# Patient Record
Sex: FEMALE | Race: WHITE | Employment: OTHER | URBAN - METROPOLITAN AREA
[De-identification: names, ages, dates, MRNs, and addresses within clinical notes are randomized per-mention and may not be internally consistent; named-entity substitution may affect disease eponyms.]

---

## 2017-08-29 ENCOUNTER — HOSPITAL ENCOUNTER (OUTPATIENT)
Dept: MAMMOGRAPHY | Age: 72
Discharge: HOME | End: 2017-08-29
Payer: MEDICARE

## 2017-08-29 DIAGNOSIS — Z12.31 VISIT FOR SCREENING MAMMOGRAM: ICD-10-CM

## 2017-08-29 PROCEDURE — 77063 BREAST TOMOSYNTHESIS BI: CPT

## 2017-10-16 ENCOUNTER — OFFICE VISIT (OUTPATIENT)
Dept: FAMILY MEDICINE CLINIC | Age: 72
End: 2017-10-16

## 2017-10-16 VITALS
TEMPERATURE: 96.6 F | DIASTOLIC BLOOD PRESSURE: 58 MMHG | RESPIRATION RATE: 16 BRPM | HEART RATE: 66 BPM | HEIGHT: 64 IN | BODY MASS INDEX: 26.48 KG/M2 | WEIGHT: 155.1 LBS | SYSTOLIC BLOOD PRESSURE: 130 MMHG | OXYGEN SATURATION: 95 %

## 2017-10-16 DIAGNOSIS — E66.9 OBESITY, UNSPECIFIED CLASSIFICATION, UNSPECIFIED OBESITY TYPE, UNSPECIFIED WHETHER SERIOUS COMORBIDITY PRESENT: Primary | ICD-10-CM

## 2017-10-16 DIAGNOSIS — Z98.84 BARIATRIC SURGERY STATUS: ICD-10-CM

## 2017-10-16 RX ORDER — PHENTERMINE HYDROCHLORIDE 37.5 MG/1
37.5 CAPSULE ORAL
COMMUNITY
End: 2017-10-16 | Stop reason: SDUPTHER

## 2017-10-16 RX ORDER — VENLAFAXINE 37.5 MG/1
37.5 TABLET ORAL DAILY
COMMUNITY

## 2017-10-16 RX ORDER — LEVOTHYROXINE SODIUM 137 UG/1
125 CAPSULE ORAL DAILY
COMMUNITY
End: 2019-02-28 | Stop reason: ALTCHOICE

## 2017-10-16 RX ORDER — LANOLIN ALCOHOL/MO/W.PET/CERES
500 CREAM (GRAM) TOPICAL DAILY
COMMUNITY

## 2017-10-16 RX ORDER — LISINOPRIL AND HYDROCHLOROTHIAZIDE 10; 12.5 MG/1; MG/1
TABLET ORAL DAILY
COMMUNITY
End: 2019-02-28 | Stop reason: ALTCHOICE

## 2017-10-16 RX ORDER — PHENTERMINE HYDROCHLORIDE 37.5 MG/1
37.5 CAPSULE ORAL
Qty: 30 CAP | Refills: 1 | Status: SHIPPED | OUTPATIENT
Start: 2017-10-16 | End: 2017-12-12 | Stop reason: SDUPTHER

## 2017-10-16 NOTE — PROGRESS NOTES
HISTORY OF PRESENT ILLNESS  Lucero Sylvester is a 74 y.o. female. New Patient   Pertinent negatives include no chest pain, no abdominal pain, no headaches and no shortness of breath. Weight Management   Pertinent negatives include no chest pain, no abdominal pain, no headaches and no shortness of breath. Medication Refill   Pertinent negatives include no chest pain, no abdominal pain, no headaches and no shortness of breath. Obesity Mgmt:  The patient is a former patient of mine last seen at [address] [place] [address]. She presents today for f/u for medication mgmt for obesity. She has been 147-150 pounds for years. Just recently it has increased to 150-152 pounds. Her goal weight is 140-150 pounds. She is 5 years postop. She has been on Phentermine daily until approximately 10 days ago. It was very effective and she denies any S/E. She tried Contrave but felt she was not feeling well, nausea, \"felt so bad\" and just wanted to sit around. She was unable to tolerate the medication and ultimately she stopped taking it after two weeks. GBP Surgery August 20, 2012 with Dr. Ashlyn Camarillo. Preop weight 253 pounds. Her daughter in law is having a gastric balloon placed next Tuesday at the [place]. Taking nutritional supplements as recommended. Her last labs were in July 2017. Exercise: Not very much. Review of Systems   Constitutional: Positive for weight gain. Negative for malaise/fatigue and weight loss. Respiratory: Negative for shortness of breath. Cardiovascular: Negative for chest pain, palpitations and leg swelling. Gastrointestinal: Positive for constipation (taking Linzess, Miralax with relief most of the time). Negative for abdominal pain, diarrhea, heartburn, nausea and vomiting. Has had some dumping with eating sugars   Musculoskeletal: Positive for joint pain (left knee pain, had a steroid injection just before her [place] trip, she has had some catching with initially getting up).  Negative for falls.   Neurological: Negative for dizziness and headaches. Wt Readings from Last 3 Encounters:   10/16/17 155 lb 1.6 oz (70.4 kg)   07/31/14 170 lb (77.1 kg)   07/09/14 170 lb (77.1 kg)     Temp Readings from Last 3 Encounters:   10/16/17 96.6 °F (35.9 °C) (Oral)   11/20/12 97.5 °F (36.4 °C)   09/18/12 98.1 °F (36.7 °C)     BP Readings from Last 3 Encounters:   10/16/17 130/58   07/31/14 133/71   07/09/14 159/70     Pulse Readings from Last 3 Encounters:   10/16/17 66   07/31/14 70   07/09/14 64       Physical Exam   Constitutional: She is oriented to person, place, and time. She appears well-developed and well-nourished. No distress. Neck: Normal range of motion. Neck supple. No thyromegaly present. Cardiovascular: Normal rate, regular rhythm and normal heart sounds. Exam reveals no gallop and no friction rub. No murmur heard. Pulmonary/Chest: Effort normal and breath sounds normal. No respiratory distress. She has no wheezes. She has no rales. She exhibits no tenderness. Abdominal: Soft. Bowel sounds are normal. She exhibits no distension and no mass. There is no tenderness. Neurological: She is alert and oriented to person, place, and time. Skin: Skin is warm and dry. No rash noted. No pallor. Psychiatric: She has a normal mood and affect. ASSESSMENT and PLAN    ICD-10-CM ICD-9-CM    1. Obesity, unspecified classification, unspecified obesity type, unspecified whether serious comorbidity present E66.9 278.00 phentermine 37.5 mg capsule   2. Bariatric surgery status Z98.84 V45.86 phentermine 37.5 mg capsule     Follow-up Disposition:  Return in about 2 months (around 12/16/2017) for Medication Check, Weight Mgmt.   reviewed diet, exercise and weight control  reviewed medications and side effects in detail

## 2017-10-16 NOTE — PATIENT INSTRUCTIONS
When You Are Overweight: Care Instructions  Your Care Instructions  If you're overweight, your doctor may recommend that you make changes in your eating and exercise habits. Being overweight can lead to serious health problems, such as high blood pressure, heart disease, type 2 diabetes, and arthritis, or it can make these problems worse. Eating a healthy diet and being more active can help you reach and stay at a healthy weight. You dont have to make huge changes all at once. Start by making small changes in your eating and exercise habits. To lose weight, you need to burn more calories than you take in. You can do this by eating healthy foods in reasonable amounts and becoming more active every day. Follow-up care is a key part of your treatment and safety. Be sure to make and go to all appointments, and call your doctor if you are having problems. It's also a good idea to know your test results and keep a list of the medicines you take. How can you care for yourself at home? · Improve your eating habits. You'll be more successful if you work on changing one eating habit at a time. All foods, if eaten in moderation, can be part of healthy eating. Remember to:  [facility] a variety of foods from each food group. Include grains, vegetables, fruits, dairy, and protein foods. ¨ Limit foods high in fat, sugar, and calories. ¨ Eat slowly. And don't do anything else, such as watch TV, while you are eating. ¨ Pay attention to portion sizes. Put your food on a smaller plate. ¨ Plan your meals ahead of time. You'll be less likely to grab something that's not as healthy. · Get active. Regular activity can help you feel better, have more energy, and burn more calories. If you haven't been active, start slowly. Start with at least 30 minutes of moderate activity on most days of the week. Then gradually increase the amount of activity. Try for 60 or 90 minutes a day, at least 5 days a week.  There are a lot of ways to fit activity into your life. You can:  ¨ Walk or bike to the store. Or walk with a friend, or walk the dog. ¨ Mow the lawn, rake leaves, shovel snow, or do some gardening. ¨ Use the stairs instead of the elevator, at least for a few floors. · Change your thinking. Your thoughts have a lot to do with how you feel and what you do. When you're trying to reach a healthy weight, changing how you think about certain things may help. Here are some ideas:  ¨ Don't compare yourself to others. Healthy bodies come in all shapes and sizes. ¨ Pay attention to how hungry or full you feel. When you eat, be aware of why you're eating and how much you're eating. ¨ Focus on improving your health instead of dieting. Dieting almost never works over the long term. · Ask your doctor about other health professionals who can help you reach a healthy weight. ¨ A dietitian can help you make healthy changes in your diet. ¨ An exercise specialist or  can help you develop a safe and effective exercise program.  ¨ A counselor or psychiatrist can help you cope with issues such as depression, anxiety, or family problems that can make it hard to focus on reaching a healthy weight. · Get support from your family, your doctor, your friends, a support groupand support yourself. Where can you learn more? Go to http://dileep-tuyet.info/. Enter M622 in the search box to learn more about \"When You Are Overweight: Care Instructions. \"  Current as of: October 13, 2016  Content Version: 11.3  © 2595-9864 [employer]. Care instructions adapted under license by [employer] (which disclaims liability or warranty for this information). If you have questions about a medical condition or this instruction, always ask your healthcare professional. [address] any warranty or liability for your use of this information.   Phentermine (By mouth)   Phentermine (JIMBO-ter-yue)  Helps you lose weight when used for a short time. Brand Name(s): Adipex-P, Lomaira   There may be other brand names for this medicine. When This Medicine Should Not Be Used: This medicine is not right for everyone. Do not use it if you had an allergic reaction to phentermine or similar medicines, or if you are pregnant. How to Use This Medicine:   Dissolving Tablet, Capsule, Long Acting Capsule, Tablet, Dissolving Tablet  · Your doctor will tell you how much medicine to use. Do not use more than directed. · This medicine is not for long-term use. · To avoid trouble sleeping, always take this medicine in the morning and never at bedtime or late in the evening. ¨ Take the capsule 2 hours after breakfast.  ¨ Take the extended-release capsule before breakfast.  ¨ Take the disintegrating tablet in the morning, with or without food. ¨ Take the phentermine tablet before breakfast or 1 to 2 hours after breakfast.  ¨ Take Lomaira tablet 30 minutes before meals. · Swallow the extended-release capsule whole. Do not crush, break, or chew it. · If you are using the disintegrating tablet, make sure your hands are dry before you handle the tablet. Place the tablet on your tongue. It should melt quickly. After the tablet has melted, swallow or take a drink of water. · Tablet: Swallow whole. Do not crush, break, or chew it. · Carefully follow your doctor's instructions about any special diet. · Missed dose: Take a dose as soon as you remember. If it is almost time for your next dose, wait until then and take a regular dose. Do not take extra medicine to make up for a missed dose. · Store the medicine in a closed container at room temperature, away from heat, moisture, and direct light. Drugs and Foods to Avoid:   Ask your doctor or pharmacist before using any other medicine, including over-the-counter medicines, vitamins, and herbal products.   · Do not use this medicine and an MAO inhibitor (MAOI) within 14 days of each other. · Some medicines can affect how phentermine works. Tell your doctor if you are using any of the following:  ¨ Amphetamine medicine (including dextroamphetamine, methamphetamine)  ¨ Diet pills  ¨ Insulin or diabetes medicine  ¨ Medicine to treat depression (including fluoxetine, fluvoxamine, paroxetine, sertraline)  · Do not drink alcohol while you are using this medicine. Warnings While Using This Medicine:   · It is not safe to take this medicine during pregnancy. It could harm an unborn baby. Tell your doctor right away if you become pregnant. · Tell your doctor if you are breastfeeding, or if you have kidney disease, diabetes, glaucoma, congestive heart failure, heart or blood vessel disease, high blood pressure, overactive thyroid, or a history of stroke or drug abuse. Tell your doctor if have allergies to aspirin or tartrazine. · This medicine may cause the following problems:  ¨ Primary pulmonary hypertension (a serious lung problem)  ¨ Heart valve disease  ¨ Changes in blood sugar levels  · This medicine may make you dizzy or drowsy. Do not drive or do anything else that could be dangerous until you know how this medicine affects you. · This medicine can be habit-forming. Do not use more than your prescribed dose. Call your doctor if you think your medicine is not working. · Do not stop using this medicine suddenly. Your doctor will need to slowly decrease your dose before you stop it completely. · Your doctor will check your progress and the effects of this medicine at regular visits. Keep all appointments. · Keep all medicine out of the reach of children. Never share your medicine with anyone.   Possible Side Effects While Using This Medicine:   Call your doctor right away if you notice any of these side effects:  · Allergic reaction: Itching or hives, swelling in your face or hands, swelling or tingling in your mouth or throat, chest tightness, trouble breathing  · Chest pain, fainting, trouble breathing  · Fast, slow, pounding, or uneven heartbeat  · Seizures or tremors  · Severe headache  · Swelling of your feet or lower legs  If you notice these less serious side effects, talk with your doctor:   · Changes in sex drive  · Dizziness, drowsiness, mild headache  · Dry mouth or a bad taste in your mouth  · Nausea, vomiting, diarrhea, constipation, stomach cramps  · Restlessness or nervousness, trouble sleeping  If you notice other side effects that you think are caused by this medicine, tell your doctor. Call your doctor for medical advice about side effects. You may report side effects to FDA at 2-580-FDA-1091  © 2017 [address] [employer] Information is for End User's use only and may not be sold, redistributed or otherwise used for commercial purposes. The above information is an  only. It is not intended as medical advice for individual conditions or treatments. Talk to your doctor, nurse or pharmacist before following any medical regimen to see if it is safe and effective for you. [employer] Activation    Thank you for requesting access to [employer]. Please follow the instructions below to securely access and download your online medical record. [employer] allows you to send messages to your doctor, view your test results, renew your prescriptions, schedule appointments, and more. How Do I Sign Up? 1. In your internet browser, go to [email or web address]  2. Click on the First Time User? Click Here link in the Sign In box. You will be redirect to the New Member Sign Up page. 3. Enter your [employer] Access Code exactly as it appears below. You will not need to use this code after youve completed the sign-up process. If you do not sign up before the expiration date, you must request a new code. [employer] Access Code:  Activation code not generated  Current [employer] Status: Active (This is the date your [employer] access code will )    4. Enter the last four digits of your Social Security Number (xxxx) and Date of Birth (mm/dd/yyyy) as indicated and click Submit. You will be taken to the next sign-up page. 5. Create a [employer] ID. This will be your [employer] login ID and cannot be changed, so think of one that is secure and easy to remember. 6. Create a [employer] password. You can change your password at any time. 7. Enter your Password Reset Question and Answer. This can be used at a later time if you forget your password. 8. Enter your e-mail address. You will receive e-mail notification when new information is available in [address] 9. Click Sign Up. You can now view and download portions of your medical record. 10. Click the Download Summary menu link to download a portable copy of your medical information. Additional Information    If you have questions, please visit the Frequently Asked Questions section of the [employer] website at [email or web address] Remember, [employer] is NOT to be used for urgent needs. For medical emergencies, dial 911.

## 2017-10-16 NOTE — MR AVS SNAPSHOT
Visit Information Date & Time Provider Department Dept. Phone Encounter #  
 10/16/2017  2:00 PM Consuelo Koroma NP [facility] [phone] [id number] Follow-up Instructions Return in about 2 months (around 12/16/2017) for Medication Check, Weight Mgmt. Upcoming Health Maintenance Date Due FOBT Q 1 YEAR AGE 50-75 4/11/1993 ZOSTER VACCINE AGE 60> 2/11/2003 OSTEOPOROSIS SCREENING (DEXA) 4/11/2008 MEDICARE YEARLY EXAM 4/11/2008 MICROALBUMIN Q1 7/20/2010 FOOT EXAM Q1 8/18/2011 EYE EXAM RETINAL OR DILATED Q1 10/12/2011 GLAUCOMA SCREENING Q2Y 10/12/2012 HEMOGLOBIN A1C Q6M 2/2/2013 LIPID PANEL Q1 8/2/2013 Pneumococcal 65+ Low/Medium Risk (1 of 2 - PCV13) 10/31/2017* DTaP/Tdap/Td series (1 - Tdap) 10/29/2021* *Topic was postponed. The date shown is not the original due date. Allergies as of 10/16/2017  Review Complete On: 10/16/2017 By: Consuelo Koroma NP Severity Noted Reaction Type Reactions Cat Dander High 10/16/2017    Shortness of Breath Dog Dander High 10/16/2017    Shortness of Breath Mold High 10/16/2017    Shortness of Breath Other Plant, Animal, Environmental High 10/16/2017    Shortness of Breath Dust  
  
Current Immunizations  Reviewed on 8/20/2012 No immunizations on file. Not reviewed this visit You Were Diagnosed With   
  
 Codes Comments Obesity, unspecified classification, unspecified obesity type, unspecified whether serious comorbidity present    -  Primary ICD-10-CM: E66.9 ICD-9-CM: 278.00 Bariatric surgery status     ICD-10-CM: Z98.84 ICD-9-CM: V45.86 Vitals BP Pulse Temp Resp Height(growth percentile) Weight(growth percentile) 130/58 (BP 1 Location: Left arm, BP Patient Position: Sitting) 66 96.6 °F (35.9 °C) (Oral) 16 5' 3.5\" (1.613 m) 155 lb 1.6 oz (70.4 kg) SpO2 BMI OB Status Smoking Status 95% 27.04 kg/m2 Hysterectomy Former Smoker BMI and BSA Data Body Mass Index Body Surface Area  
 27.04 kg/m 2 1.78 m 2 Preferred Pharmacy Pharmacy Name Phone [id number] Angela Curry, [address] [phone] Your Updated Medication List  
  
   
This list is accurate as of: 10/16/17  3:31 PM.  Always use your most recent med list.  
  
  
  
  
 calcium 500 mg Tab Take  by mouth. Levothyroxine 137 mcg Cap Take  by mouth. LINZESS 290 mcg Cap capsule Generic drug:  linaclotide Take  by mouth Daily (before breakfast). lisinopril-hydroCHLOROthiazide 10-12.5 mg per tablet Commonly known as:  Climmie Talia Take  by mouth daily. multivitamin tablet Commonly known as:  ONE A DAY Take 1 Tab by mouth daily. omeprazole 20 mg capsule Commonly known as:  PRILOSEC Take 20 mg by mouth daily. phentermine 37.5 mg capsule Take 37.5 mg by mouth every morning. Max Daily Amount: 37.5 mg. PRESERVISION AREDS Cap capsule Generic drug:  Vit A,C,E-Zinc-Copper Take 1 Cap by mouth two (2) times a day. BREAKFAST & BED TIME. simvastatin 40 mg tablet Commonly known as:  ZOCOR  
TAKE 1 TABLET BY MOUTH NIGHTLY  
  
 venlafaxine 37.5 mg tablet Commonly known as:  [facility] Take 37.5 mg by mouth three (3) times daily. VITAMIN B-12 500 mcg tablet Generic drug:  cyanocobalamin Take 500 mcg by mouth daily. Prescriptions Printed Refills  
 phentermine 37.5 mg capsule 1 Sig: Take 37.5 mg by mouth every morning. Max Daily Amount: 37.5 mg.  
 Class: Print Route: Oral  
  
Follow-up Instructions Return in about 2 months (around 12/16/2017) for Medication Check, Weight Mgmt. Patient Instructions When You Are Overweight: Care Instructions Your Care Instructions If you're overweight, your doctor may recommend that you make changes in your eating and exercise habits.  Being overweight can lead to serious health problems, such as high blood pressure, heart disease, type 2 diabetes, and arthritis, or it can make these problems worse. Eating a healthy diet and being more active can help you reach and stay at a healthy weight. You dont have to make huge changes all at once. Start by making small changes in your eating and exercise habits. To lose weight, you need to burn more calories than you take in. You can do this by eating healthy foods in reasonable amounts and becoming more active every day. Follow-up care is a key part of your treatment and safety. Be sure to make and go to all appointments, and call your doctor if you are having problems. It's also a good idea to know your test results and keep a list of the medicines you take. How can you care for yourself at home? · Improve your eating habits. You'll be more successful if you work on changing one eating habit at a time. All foods, if eaten in moderation, can be part of healthy eating. Remember to: 
[facility] a variety of foods from each food group. Include grains, vegetables, fruits, dairy, and protein foods. ¨ Limit foods high in fat, sugar, and calories. ¨ Eat slowly. And don't do anything else, such as watch TV, while you are eating. ¨ Pay attention to portion sizes. Put your food on a smaller plate. ¨ Plan your meals ahead of time. You'll be less likely to grab something that's not as healthy. · Get active. Regular activity can help you feel better, have more energy, and burn more calories. If you haven't been active, start slowly. Start with at least 30 minutes of moderate activity on most days of the week. Then gradually increase the amount of activity. Try for 60 or 90 minutes a day, at least 5 days a week. There are a lot of ways to fit activity into your life. You can: 
¨ Walk or bike to the store. Or walk with a friend, or walk the dog. ¨ Mow the lawn, rake leaves, shovel snow, or do some gardening. ¨ Use the stairs instead of the elevator, at least for a few floors. · Change your thinking. Your thoughts have a lot to do with how you feel and what you do. When you're trying to reach a healthy weight, changing how you think about certain things may help. Here are some ideas: ¨ Don't compare yourself to others. Healthy bodies come in all shapes and sizes. ¨ Pay attention to how hungry or full you feel. When you eat, be aware of why you're eating and how much you're eating. ¨ Focus on improving your health instead of dieting. Dieting almost never works over the long term. · Ask your doctor about other health professionals who can help you reach a healthy weight. ¨ A dietitian can help you make healthy changes in your diet. ¨ An exercise specialist or  can help you develop a safe and effective exercise program. 
¨ A counselor or psychiatrist can help you cope with issues such as depression, anxiety, or family problems that can make it hard to focus on reaching a healthy weight. · Get support from your family, your doctor, your friends, a support groupand support yourself. Where can you learn more? Go to http://dileepBeHome247tuyet.info/. Enter X965 in the search box to learn more about \"When You Are Overweight: Care Instructions. \" Current as of: October 13, 2016 Content Version: 11.3 © 7977-2088 Healthwise, Incorporated. Care instructions adapted under license by [employer] (which disclaims liability or warranty for this information). If you have questions about a medical condition or this instruction, always ask your healthcare professional. [place] [address] any warranty or liability for your use of this information. Phentermine (By mouth) Phentermine (FEN-ter-meen) Helps you lose weight when used for a short time. Brand Name(s): Sergio Olea There may be other brand names for this medicine. When This Medicine Should Not Be Used: This medicine is not right for everyone. Do not use it if you had an allergic reaction to phentermine or similar medicines, or if you are pregnant. How to Use This Medicine:  
Dissolving Tablet, Capsule, Long Acting Capsule, Tablet, Dissolving Tablet · Your doctor will tell you how much medicine to use. Do not use more than directed. · This medicine is not for long-term use. · To avoid trouble sleeping, always take this medicine in the morning and never at bedtime or late in the evening. ¨ Take the capsule 2 hours after breakfast. 
¨ Take the extended-release capsule before breakfast. 
¨ Take the disintegrating tablet in the morning, with or without food. ¨ Take the phentermine tablet before breakfast or 1 to 2 hours after breakfast. 
¨ Take Lomaira tablet 30 minutes before meals. · Swallow the extended-release capsule whole. Do not crush, break, or chew it. · If you are using the disintegrating tablet, make sure your hands are dry before you handle the tablet. Place the tablet on your tongue. It should melt quickly. After the tablet has melted, swallow or take a drink of water. · Tablet: Swallow whole. Do not crush, break, or chew it. · Carefully follow your doctor's instructions about any special diet. · Missed dose: Take a dose as soon as you remember. If it is almost time for your next dose, wait until then and take a regular dose. Do not take extra medicine to make up for a missed dose. · Store the medicine in a closed container at room temperature, away from heat, moisture, and direct light. Drugs and Foods to Avoid: Ask your doctor or pharmacist before using any other medicine, including over-the-counter medicines, vitamins, and herbal products. · Do not use this medicine and an MAO inhibitor (MAOI) within 14 days of each other. · Some medicines can affect how phentermine works. Tell your doctor if you are using any of the following: ¨ Amphetamine medicine (including dextroamphetamine, methamphetamine) ¨ Diet pills ¨ Insulin or diabetes medicine ¨ Medicine to treat depression (including fluoxetine, fluvoxamine, paroxetine, sertraline) · Do not drink alcohol while you are using this medicine. Warnings While Using This Medicine: · It is not safe to take this medicine during pregnancy. It could harm an unborn baby. Tell your doctor right away if you become pregnant. · Tell your doctor if you are breastfeeding, or if you have kidney disease, diabetes, glaucoma, congestive heart failure, heart or blood vessel disease, high blood pressure, overactive thyroid, or a history of stroke or drug abuse. Tell your doctor if have allergies to aspirin or tartrazine. · This medicine may cause the following problems: 
¨ Primary pulmonary hypertension (a serious lung problem) ¨ Heart valve disease ¨ Changes in blood sugar levels · This medicine may make you dizzy or drowsy. Do not drive or do anything else that could be dangerous until you know how this medicine affects you. · This medicine can be habit-forming. Do not use more than your prescribed dose. Call your doctor if you think your medicine is not working. · Do not stop using this medicine suddenly. Your doctor will need to slowly decrease your dose before you stop it completely. · Your doctor will check your progress and the effects of this medicine at regular visits. Keep all appointments. · Keep all medicine out of the reach of children. Never share your medicine with anyone. Possible Side Effects While Using This Medicine:  
Call your doctor right away if you notice any of these side effects: · Allergic reaction: Itching or hives, swelling in your face or hands, swelling or tingling in your mouth or throat, chest tightness, trouble breathing · Chest pain, fainting, trouble breathing · Fast, slow, pounding, or uneven heartbeat · Seizures or tremors · Severe headache · Swelling of your feet or lower legs If you notice these less serious side effects, talk with your doctor: · Changes in sex drive · Dizziness, drowsiness, mild headache · Dry mouth or a bad taste in your mouth · Nausea, vomiting, diarrhea, constipation, stomach cramps · Restlessness or nervousness, trouble sleeping If you notice other side effects that you think are caused by this medicine, tell your doctor. Call your doctor for medical advice about side effects. You may report side effects to FDA at 4-811-TLA-4990 ©  2600 Kian Carrasco Information is for End User's use only and may not be sold, redistributed or otherwise used for commercial purposes. The above information is an  only. It is not intended as medical advice for individual conditions or treatments. Talk to your doctor, nurse or pharmacist before following any medical regimen to see if it is safe and effective for you. In[employer] Resource Activation Thank you for requesting access to Infusion Resource. Please follow the instructions below to securely access and download your online medical record. In[employer] Resource allows you to send messages to your doctor, view your test results, renew your prescriptions, schedule appointments, and more. How Do I Sign Up? 1. In your internet browser, go to [email or web address] 
2. Click on the First Time User? Click Here link in the Sign In box. You will be redirect to the New Member Sign Up page. 3. Enter your In[employer] Resource Access Code exactly as it appears below. You will not need to use this code after youve completed the sign-up process. If you do not sign up before the expiration date, you must request a new code. In[employer] Access Code: Activation code not generated Current Infusion Resource Status: Active (This is the date your Infusion Resource access code will ) 4. Enter the last four digits of your Social Security Number (xxxx) and Date of Birth (mm/dd/yyyy) as indicated and click Submit.  You will be taken to the next sign-up page. 5. Create a [employer] ID. This will be your [employer] login ID and cannot be changed, so think of one that is secure and easy to remember. 6. Create a [employer] password. You can change your password at any time. 7. Enter your Password Reset Question and Answer. This can be used at a later time if you forget your password. 8. Enter your e-mail address. You will receive e-mail notification when new information is available in [address] 9. Click Sign Up. You can now view and download portions of your medical record. 10. Click the Download Summary menu link to download a portable copy of your medical information. Additional Information If you have questions, please visit the Frequently Asked Questions section of the [employer] website at [email or web address] Remember, [employer] is NOT to be used for urgent needs. For medical emergencies, dial 911. Introducing [facility] SERVICES! Dear Alana Whittington: Thank you for requesting a [employer] account. Our records indicate that you already have an active [employer] account. You can access your account anytime at [email or web address]/Endpoint Clinical Did you know that you can access your hospital and ER discharge instructions at any time in [employer]? You can also review all of your test results from your hospital stay or ER visit. Additional Information If you have questions, please visit the Frequently Asked Questions section of the [employer] website at [email or web address] Remember, [employer] is NOT to be used for urgent needs. For medical emergencies, dial 911. Now available from your iPhone and Android! Please provide this summary of care documentation to your next provider. Your primary care clinician is listed as Jose Feliciano. If you have any questions after today's visit, please call [phone].

## 2017-12-12 ENCOUNTER — OFFICE VISIT (OUTPATIENT)
Dept: FAMILY MEDICINE CLINIC | Age: 72
End: 2017-12-12

## 2017-12-12 VITALS
BODY MASS INDEX: 26.26 KG/M2 | WEIGHT: 153.8 LBS | OXYGEN SATURATION: 92 % | TEMPERATURE: 97.2 F | HEIGHT: 64 IN | RESPIRATION RATE: 18 BRPM | SYSTOLIC BLOOD PRESSURE: 137 MMHG | HEART RATE: 75 BPM | DIASTOLIC BLOOD PRESSURE: 68 MMHG

## 2017-12-12 DIAGNOSIS — H61.21 IMPACTED CERUMEN OF RIGHT EAR: ICD-10-CM

## 2017-12-12 DIAGNOSIS — Z98.84 BARIATRIC SURGERY STATUS: ICD-10-CM

## 2017-12-12 DIAGNOSIS — E66.9 OBESITY, UNSPECIFIED CLASSIFICATION, UNSPECIFIED OBESITY TYPE, UNSPECIFIED WHETHER SERIOUS COMORBIDITY PRESENT: Primary | ICD-10-CM

## 2017-12-12 DIAGNOSIS — J45.20 MILD INTERMITTENT ASTHMA WITHOUT COMPLICATION: Chronic | ICD-10-CM

## 2017-12-12 DIAGNOSIS — L98.7 EXCESS SKIN: ICD-10-CM

## 2017-12-12 RX ORDER — ALBUTEROL SULFATE 90 UG/1
2 AEROSOL, METERED RESPIRATORY (INHALATION)
COMMUNITY
End: 2017-12-12 | Stop reason: SDUPTHER

## 2017-12-12 RX ORDER — PHENTERMINE HYDROCHLORIDE 37.5 MG/1
37.5 CAPSULE ORAL
Qty: 30 CAP | Refills: 1 | Status: SHIPPED | OUTPATIENT
Start: 2017-12-12 | End: 2018-03-01 | Stop reason: SDUPTHER

## 2017-12-12 RX ORDER — MONTELUKAST SODIUM 10 MG/1
10 TABLET ORAL DAILY
Qty: 30 TAB | Refills: 1 | Status: SHIPPED | OUTPATIENT
Start: 2017-12-12 | End: 2018-03-05 | Stop reason: SDUPTHER

## 2017-12-12 RX ORDER — ALBUTEROL SULFATE 90 UG/1
2 AEROSOL, METERED RESPIRATORY (INHALATION)
Qty: 1 INHALER | Refills: 1 | Status: SHIPPED | OUTPATIENT
Start: 2017-12-12 | End: 2018-05-11 | Stop reason: ALTCHOICE

## 2017-12-12 NOTE — PATIENT INSTRUCTIONS
Albuterol (AccuNeb, Proventil, Proventil HFA, Ventolin HFA) - (By breathing)   Why this medicine is used:   Treats or prevents bronchospasm. Contact a nurse or doctor right away if you have:  · Trouble breathing, increased wheezing or cough, chest tightness  · Fast, pounding, or uneven heartbeat; chest pain  · Muscle cramps, nausea, vomiting  · Dry mouth, increased thirst     Common side effects:  · Tremors, nervousness  · Cough, sore throat, runny or stuffy nose  · Headache  © 2017 2600 K[employer] Carras[employer] Information is for End User's use only and may not be sold, redistributed or otherwise used for commercial purposes.

## 2017-12-12 NOTE — PROGRESS NOTES
Chief Complaint   Patient presents with    Weight Management     Here for weight check and med refill.  Medication Refill     Doing well on the Phentrimene     1. Have you been to the ER, urgent care clinic since your last visit? Hospitalized since your last visit? No    2. Have you seen or consulted any other health care providers outside of the [address] [place] [address] since your last visit? Include any pap smears or colon screening. No     I have reviewed Health Maintenance with the patient and updated. Advance Care Planning information reviewed and given to the patient. The patient was counseled on the dangers of tobacco use, and Patient is a non smoker. Reviewed strategies to maximize success, including Continue not to smoke.

## 2017-12-12 NOTE — MR AVS SNAPSHOT
Visit Information Date & Time Provider Department Dept. Phone Encounter #  
 12/12/2017  8:00 AM Lena Salguero NP [facility] [phone] [id number] Follow-up Instructions Return in about 2 months (around 2/12/2018) for Medication Check, Weight Mgmt. Upcoming Health Maintenance Date Due HEMOGLOBIN A1C Q6M 6/12/2018 LIPID PANEL Q1 7/19/2018 COLONOSCOPY 8/13/2018 FOOT EXAM Q1 10/19/2018 MICROALBUMIN Q1 10/19/2018 MEDICARE YEARLY EXAM 12/13/2018 GLAUCOMA SCREENING Q2Y 8/9/2019 DTaP/Tdap/Td series (2 - Td) 11/14/2022 Allergies as of 12/12/2017  Review Complete On: 12/12/2017 By: Lena Salguero NP Severity Noted Reaction Type Reactions Cat Dander High 10/16/2017    Shortness of Breath Dog Dander High 10/16/2017    Shortness of Breath Mold High 10/16/2017    Shortness of Breath Other Plant, Animal, Environmental High 10/16/2017    Shortness of Breath Dust  
  
Current Immunizations  Reviewed on 12/12/2017 No immunizations on file. Reviewed by Sheryle Kill, RN on 12/12/2017 at  8:16 AM  
You Were Diagnosed With   
  
 Codes Comments Obesity, unspecified classification, unspecified obesity type, unspecified whether serious comorbidity present    -  Primary ICD-10-CM: E66.9 ICD-9-CM: 278.00 Bariatric surgery status     ICD-10-CM: Z98.84 ICD-9-CM: V45.86 Mild intermittent asthma without complication     SALINAS-57-MS: J45.20 ICD-9-CM: 493.90 Excess skin     ICD-10-CM: L98.7 ICD-9-CM: 701.9 Impacted cerumen of right ear     ICD-10-CM: H61.21 ICD-9-CM: 380.4 Vitals BP Pulse Temp Resp Height(growth percentile) Weight(growth percentile)  
 137/68 (BP 1 Location: Left arm, BP Patient Position: Sitting) 75 97.2 °F (36.2 °C) (Oral) 18 5' 3.5\" (1.613 m) 153 lb 12.8 oz (69.8 kg) SpO2 BMI OB Status Smoking Status 92% 26.82 kg/m2 Hysterectomy Former Smoker Vitals History BMI and BSA Data Body Mass Index Body Surface Area  
 26.82 kg/m 2 1.77 m 2 Preferred Pharmacy Pharmacy Name Phone [id number] Angela Eric, [address] [phone] Your Updated Medication List  
  
   
This list is accurate as of: 12/12/17  9:27 AM.  Always use your most recent med list.  
  
  
  
  
 albuterol 90 mcg/actuation inhaler Commonly known as:  PROAIR HFA Take 2 Puffs by inhalation every four (4) hours as needed for Wheezing. calcium 500 mg Tab Take  by mouth. Levothyroxine 137 mcg Cap Take  by mouth. LINZESS 290 mcg Cap capsule Generic drug:  linaclotide Take  by mouth Daily (before breakfast). lisinopril-hydroCHLOROthiazide 10-12.5 mg per tablet Commonly known as:  Milagros [place] Take  by mouth daily. montelukast 10 mg tablet Commonly known as:  SINGULAIR Take 1 Tab by mouth daily. Indications: Allergic Rhinitis, MAINTENANCE THERAPY FOR ASTHMA  
  
 multivitamin tablet Commonly known as:  ONE A DAY Take 1 Tab by mouth daily. omeprazole 20 mg capsule Commonly known as:  PRILOSEC Take 20 mg by mouth daily. phentermine 37.5 mg capsule Take 37.5 mg by mouth every morning. Max Daily Amount: 37.5 mg. PRESERVISION AREDS Cap capsule Generic drug:  Vit A,C,E-Zinc-Copper Take 1 Cap by mouth two (2) times a day. BREAKFAST & BED TIME. simvastatin 40 mg tablet Commonly known as:  ZOCOR  
TAKE 1 TABLET BY MOUTH NIGHTLY  
  
 venlafaxine 37.5 mg tablet Commonly known as:  [facility] Take 37.5 mg by mouth three (3) times daily. VITAMIN B-12 500 mcg tablet Generic drug:  cyanocobalamin Take 500 mcg by mouth daily. Prescriptions Printed Refills  
 phentermine 37.5 mg capsule 1 Sig: Take 37.5 mg by mouth every morning. Max Daily Amount: 37.5 mg.  
 Class: Print Route: Oral  
  
Prescriptions Sent to Pharmacy Refills albuterol (PROAIR HFA) 90 mcg/actuation inhaler 1 Sig: Take 2 Puffs by inhalation every four (4) hours as needed for Wheezing. Class: Normal  
 Pharmacy: [address] #: [phone] Route: Inhalation  
 montelukast (SINGULAIR) 10 mg tablet 1 Sig: Take 1 Tab by mouth daily. Indications: Allergic Rhinitis, MAINTENANCE THERAPY FOR ASTHMA Class: Normal  
 Pharmacy: [place] [address] Ph #: [phone] Route: Oral  
  
We Performed the Following REFERRAL TO PLASTIC SURGERY [REF89 Custom] REMOVAL IMPACTED CERUMEN IRRIGATION/LVG UNILAT [id number] CPT(R)] Follow-up Instructions Return in about 2 months (around 2/12/2018) for Medication Check, Weight Mgmt. Referral Information Referral ID Referred By Referred To  
  
 [id number] [facility] [address], [address] [place] [address] Pky Visits Status Start Date End Date 1 New Request 12/12/17 12/12/18 If your referral has a status of pending review or denied, additional information will be sent to support the outcome of this decision. Patient Instructions Albuterol (AccuNeb, Proventil, Proventil HFA, Ventolin HFA) - (By breathing) Why this medicine is used:  
Treats or prevents bronchospasm. Contact a nurse or doctor right away if you have: · Trouble breathing, increased wheezing or cough, chest tightness · Fast, pounding, or uneven heartbeat; chest pain · Muscle cramps, nausea, vomiting · Dry mouth, increased thirst  
 
Common side effects: · Tremors, nervousness · Cough, sore throat, runny or stuffy nose · Headache © 2017 2600 Kian Carrasco Information is for End User's use only and may not be sold, redistributed or otherwise used for commercial purposes. Introducing [facility] & HEALTH SERVICES! Dear Emile Bruno: Thank you for requesting a [employer] account. Our records indicate that you already have an active [employer] account. You can access your account anytime at [email or web address] with Friends. [employer]/Fit with Friends Did you know that you can access your hospital and ER discharge instructions at any time in [employer]? You can also review all of your test results from your hospital stay or ER visit. Additional Information If you have questions, please visit the Frequently Asked Questions section of the [employer] website at [email or web address] with [email or web address] [employer]/Fit with Friends/. Remember, [employer] is NOT to be used for urgent needs. For medical emergencies, dial 911. Now available from your iPhone and Android! Please provide this summary of care documentation to your next provider. Your primary care clinician is listed as Marilu Dawson. If you have any questions after today's visit, please call [phone].

## 2017-12-12 NOTE — PROGRESS NOTES
Chief Complaint   Patient presents with    Weight Management     Here for weight check and med refill.  Medication Refill     Doing well on the Phentrimene         HPI:  The patient is a 74 y.o. female who presents today for a follow up appointment for weight management. Weight Mgmt:  The patient is a former patient of mine last seen at [address] [place] [address]. She has been 147-150 pounds for years. At her last visit on 10/16/2017 it had increased to 150-152 pounds. Her goal weight is 140-150 pounds. She is 5 years postop. She had been on Phentermine daily with good effectiveness. She also tried Contrave but felt she was not feeling well, nausea, \"felt so bad\" and just wanted to sit around. She was unable to tolerate the medication and ultimately she stopped taking it after about two weeks. She has lost 2 pounds since her last vist.  She started Phentermine 2 months ago with good effectiveness. She reports good hunger control and denies any adverse effects. She stopped it for two weeks during this time but restarted approximately 1 week ago. GBP Surgery August 20, 2012 with Dr. Jason Hogue. Preop weight 253 pounds. Her daughter in law was supposed to have a gastric balloon placed in October at the [place]. She never had this done because they found something during the procedure they needed to address and they postponed it. Her last labs were in July 2017. Taking nutritional supplements as recommended. Exercise: Not very much. Asthma:  She got her xmas decorations out of her storage unit x 3 weeks ago. She has had congestion, ear fullness, cough and wheezing symptoms intermittently since then. She has a hx of asthma. She denies ear pain, fever/chills. Excess Skin:  She c/o excess skin on her abdomen and her UEs. No hospital, ER or specialist visits since last primary care visit except as noted below.      Patient Active Problem List   Diagnosis Code    DM [facility]diabetes mellitus) [facility] E11.9    Asthma J45.909    Sleep apnea G47.30    HTN     Macular degeneration H35.30    Elevated LFT's     Left sided sciatica M54.32    Allergic rhinitis J30.9    Hypothyroidism E03.9    Depression F32.9    Obesity E66.9    KOENIG (nonalcoholic steatohepatitis) K75.81    Vitamin D deficiency E55.9    Bariatric surgery status Z98.84       Past Medical History:   Diagnosis Date    Allergic rhinitis 1/28/2009    Asthma 1/28/2009    Dental disorder NEC     DM (diabetes mellitus) ([address] 1/28/2009    Elevated LFT's 1/28/2009    HTN 1/28/2009    CONTROLLED BY MEDS.  Hypothyroidism 1/28/2009    Left sided sciatica 1/28/2009    Macular degeneration 1/28/2009    Morbid obesity ([address]     Musculoskeletal disorder     Sleep apnea 1/28/2009    USES C-PAP    Thyroid disease     HYPOTHYROIDISM       Past Surgical History:   Procedure Laterality Date    BREAST SURGERY PROCEDURE UNLISTED      THINKS ITS RIGHT BREAST.    HX BREAST BIOPSY Bilateral 1997    neg./ ? laterality    HX GI      COLONOSCOPY X 1    HX GYN      TOTAL VAGINAL HYSTERECTOMY    HX GYN      EXPLOROTORY FOR ECTOPIC PREGNANCY (uterine horn) BTL., DNC    HX ORTHOPAEDIC  1980    carpal tunnel release       Social History   Substance Use Topics    Smoking status: Former Smoker     Packs/day: 1.00     Years: 6.00     Quit date: 1/28/1969    Smokeless tobacco: Never Used      Comment: 1966    Alcohol use 1.0 oz/week     1 Glasses of wine, 1 Shots of liquor per week      Comment: rare       Family History   Problem Relation Age of Onset    Macular Degen Mother     Hypertension Mother     Cancer Father      lung    Hypertension Father     Breast Cancer Sister 72    Hypertension Brother        Outpatient Prescriptions Marked as Taking for the 12/12/17 encounter (Office Visit) with Adwoa Fernandez NP   Medication Sig Dispense Refill    Levothyroxine 137 mcg cap Take  by mouth.       lisinopril-hydroCHLOROthiazide (PRINZIDE, ZESTORETIC) 10-12.5 mg per tablet Take  by mouth daily.  venlafaxine (EFFEXOR) 37.5 mg tablet Take 37.5 mg by mouth three (3) times daily.  cyanocobalamin (VITAMIN B-12) 500 mcg tablet Take 500 mcg by mouth daily.  linaclotide (LINZESS) 290 mcg cap capsule Take  by mouth Daily (before breakfast).  phentermine 37.5 mg capsule Take 37.5 mg by mouth every morning. Max Daily Amount: 37.5 mg. 30 Cap 1    multivitamin (ONE A DAY) tablet Take 1 Tab by mouth daily.  Vit A,C,E-Zinc-Copper (PRESERVISION AREDS) Cap capsule Take 1 Cap by mouth two (2) times a day. BREAKFAST & BED TIME. Allergies   Allergen Reactions    Cat Dander Shortness of Breath    Dog Dander Shortness of Breath    Mold Shortness of Breath    Other Plant, Animal, Environmental Shortness of Breath     Dust       Review of Systems   Constitutional: Positive for weight loss. Negative for chills, fever and malaise/fatigue. HENT: Positive for congestion and hearing loss (right ear recently w/ ear fullness). Negative for ear discharge, ear pain, nosebleeds, sinus pain, sore throat and tinnitus. Eyes: Negative for discharge and redness. Respiratory: Positive for cough and wheezing. Negative for shortness of breath. Cardiovascular: Negative for chest pain, palpitations and leg swelling. Gastrointestinal: Positive for constipation (relieved with Linzess and Miralax), nausea (x1 episode with eating a hot dog too fast) and vomiting (x1 episode with eating a hot dog too fast). Negative for abdominal pain, diarrhea and heartburn. Musculoskeletal: Negative for joint pain, myalgias and neck pain. Skin: Negative for rash. Neurological: Negative for dizziness and headaches. Endo/Heme/Allergies: Positive for environmental allergies. Psychiatric/Behavioral: Negative for depression. The patient is not nervous/anxious and does not have insomnia.       PE:  Visit Vitals    /68 (BP 1 Location: Left arm, BP Patient Position: Sitting)    Pulse 75    Temp 97.2 °F (36.2 °C) (Oral)    Resp 18    Ht 5' 3.5\" (1.613 m)    Wt 153 lb 12.8 oz (69.8 kg)    SpO2 92%    BMI 26.82 kg/m2     Physical Exam   Constitutional: She is well-developed, well-nourished, and in no distress. No distress. HENT:   Head: Normocephalic and atraumatic. Right Ear: Tympanic membrane is bulging. Decreased hearing is noted. Left Ear: External ear normal. Tympanic membrane is bulging. No decreased hearing is noted. Nose: Mucosal edema and rhinorrhea present. Right sinus exhibits no maxillary sinus tenderness and no frontal sinus tenderness. Left sinus exhibits no maxillary sinus tenderness and no frontal sinus tenderness. Mouth/Throat: Uvula is midline and mucous membranes are normal. Posterior oropharyngeal erythema (mild) present. No oropharyngeal exudate or posterior oropharyngeal edema. Right ear cerumen impaction present, cleared with lavage   Eyes: Conjunctivae and EOM are normal. Pupils are equal, round, and reactive to light. Right eye exhibits no discharge. Left eye exhibits no discharge. No scleral icterus. Neck: Normal range of motion. Neck supple. No thyromegaly present. Cardiovascular: Normal rate, regular rhythm and normal heart sounds. Exam reveals no gallop and no friction rub. No murmur heard. Pulmonary/Chest: Effort normal. No respiratory distress. She has decreased breath sounds. She has no wheezes. She has no rales. Lymphadenopathy:     She has no cervical adenopathy. Neurological: She is alert. Skin: Skin is warm and dry. No rash noted. Psychiatric: Affect normal.     Assessment/Plan:    ICD-10-CM ICD-9-CM    1. Obesity, unspecified classification, unspecified obesity type, unspecified whether serious comorbidity present E66.9 278.00 phentermine 37.5 mg capsule   2. Bariatric surgery status Z98.84 V45.86 phentermine 37.5 mg capsule   3.  Mild intermittent asthma without complication B43.26 697.94 albuterol (PROAIR HFA) 90 mcg/actuation inhaler      montelukast (SINGULAIR) 10 mg tablet   4. Excess skin L98.7 701.9 REFERRAL TO PLASTIC SURGERY   5. Impacted cerumen of right ear H61.21 380.4 REMOVAL IMPACTED CERUMEN IRRIGATION/LVG UNILAT     Follow-up Disposition:  Return in about 2 months (around 2/12/2018) for Medication Check, Weight Mgmt. current treatment plan is effective, no change in therapy  lab results and schedule of future lab studies reviewed with patient  reviewed diet, exercise and weight control  reviewed medications and side effects in detail    Health Maintenance reviewed - reviewed, UTD. Medicare AWV recommended, pt to check with her PCP to see if she is due for this again. Recommended healthy diet low in carbohydrates, fats, sodium and cholesterol. Recommended regular cardiovascular exercise 3-6 times per week for 30-60 minutes daily. Verbal and written instructions (see AVS) provided. Patient expresses understanding of diagnosis and treatment plan.

## 2018-03-01 ENCOUNTER — OFFICE VISIT (OUTPATIENT)
Dept: FAMILY MEDICINE CLINIC | Age: 73
End: 2018-03-01

## 2018-03-01 VITALS
TEMPERATURE: 97.3 F | OXYGEN SATURATION: 94 % | RESPIRATION RATE: 18 BRPM | SYSTOLIC BLOOD PRESSURE: 128 MMHG | WEIGHT: 159.1 LBS | HEIGHT: 64 IN | BODY MASS INDEX: 27.16 KG/M2 | DIASTOLIC BLOOD PRESSURE: 61 MMHG | HEART RATE: 69 BPM

## 2018-03-01 DIAGNOSIS — Z98.84 BARIATRIC SURGERY STATUS: ICD-10-CM

## 2018-03-01 DIAGNOSIS — E66.9 OBESITY, UNSPECIFIED CLASSIFICATION, UNSPECIFIED OBESITY TYPE, UNSPECIFIED WHETHER SERIOUS COMORBIDITY PRESENT: ICD-10-CM

## 2018-03-01 DIAGNOSIS — J45.20 MILD INTERMITTENT ASTHMA WITHOUT COMPLICATION: Chronic | ICD-10-CM

## 2018-03-01 RX ORDER — PHENTERMINE HYDROCHLORIDE 37.5 MG/1
37.5 CAPSULE ORAL
Qty: 30 CAP | Refills: 1 | Status: SHIPPED | OUTPATIENT
Start: 2018-03-01 | End: 2018-04-05 | Stop reason: ALTCHOICE

## 2018-03-01 NOTE — PATIENT INSTRUCTIONS
Learning About Low-Carbohydrate Diets for Weight Loss  What is a low-carbohydrate diet? Low-carb diets avoid foods that are high in carbohydrate. These high-carb foods include pasta, bread, rice, cereal, fruits, and starchy vegetables. Instead, these diets usually have you eat foods that are high in fat and protein. Many people lose weight quickly on a low-carb diet. But the early weight loss is water. People on this diet often gain the weight back after they start eating carbs again. Not all diet plans are safe or work well. A lot of the evidence shows that low-carb diets aren't healthy. That's because these diets often don't include healthy foods like fruits and vegetables. Losing weight safely means balancing protein, fat, and carbs with every meal and snack. And low-carb diets don't always provide the vitamins, minerals, and fiber you need. If you have a serious medical condition, talk to your doctor before you try any diet. These conditions include kidney disease, heart disease, type 2 diabetes, high cholesterol, and high blood pressure. If you are pregnant, it may not be safe for your baby if you are on a low-carb diet. How can you lose weight safely? You might have heard that a diet plan helped another person lose weight. But that doesn't mean that it will work for you. It is very hard to stay on a diet that includes lots of big changes in your eating habits. If you want to get to a healthy weight and stay there, making healthy lifestyle changes will often work better than dieting. These steps can help. · Make a plan for change. Work with your doctor to create a plan that is right for you. · See a dietitian. He or she can show you how to make healthy changes in your eating habits. · Manage stress. If you have a lot of stress in your life, it can be hard to focus on making healthy changes to your daily habits. · Track your food and activity.  You are likely to do better at losing weight if you keep track of what you eat and what you do. Follow-up care is a key part of your treatment and safety. Be sure to make and go to all appointments, and call your doctor if you are having problems. It's also a good idea to know your test results and keep a list of the medicines you take. Where can you learn more? Go to http://dileep-tuyet.info/. Enter A121 in the search box to learn more about \"Learning About Low-Carbohydrate Diets for Weight Loss. \"  Current as of: May 12, 2017  Content Version: 11.4  © 1817-1982 [employer]. Care instructions adapted under license by [employer] (which disclaims liability or warranty for this information). If you have questions about a medical condition or this instruction, always ask your healthcare professional. [address] any warranty or liability for your use of this information. Liraglutide (By injection)   Liraglutide (gqb-b-MXPY-tide)  Treats type 2 diabetes and helps with weight loss in certain patients. Also reduces the risk of heart attacks and strokes in patients with type 2 diabetes and heart or blood vessel disease. Brand Name(s): Saxenda, Victoza   There may be other brand names for this medicine. When This Medicine Should Not Be Used: This medicine is not right for everyone. Do not use it if you had an allergic reaction to liraglutide, or if you have multiple endocrine neoplasia syndrome type 2 (MEN 2) or if you or anyone in your family had medullary thyroid cancer. Tell your doctor if you are pregnant or have become pregnant while you are using this medicine. How to Use This Medicine:   Injectable  · Your doctor will prescribe your exact dose and tell you how often it should be given. This medicine is given as a shot under the skin of your stomach, thighs, or upper arms. · If you use insulin in addition to this medicine, do not mix them into the same syringe.  You may give the shots in the same area (including your stomach), but do not give the shots right next to each other. · You may be taught how to give your medicine at home. Make sure you understand all instructions before giving yourself an injection. Do not use more medicine or use it more often than your doctor tells you to. · Check the liquid in the pen. It should be clear and colorless. Do not use it if it is cloudy, discolored, or has particles in it. · You will be shown the body areas where this shot can be given. Use a different body area each time you give yourself a shot. Keep track of where you give each shot to make sure you rotate body areas. · Use a new needle and syringe each time you inject your medicine. · Never share medicine pens with others under any circumstances. Sharing needles or pens can result in transmission of infection. · Drink extra fluids so you will urinate more often and help prevent kidney problems. · This medicine should come with a Medication Guide. Ask your pharmacist for a copy if you do not have one. · Missed dose: If you miss a dose of this medicine, use it as soon as you remember. Then take your next dose at your usual time. Never take extra medicine to make up for a missed dose. If you miss a dose for 3 days or more, call your doctor to talk about how to restart your treatment. · Store your new, unused medicine pen in its original carton in the refrigerator. Protect it from light. Do not freeze this medicine or use it if it has been frozen. You may store the opened medicine pen in the refrigerator or at room temperature for 30 days. Throw away your used pen after 30 days, even if it still has medicine in it. Always remove the needle from the pen before you store it. · Throw away used needles in a hard, closed container that the needles cannot poke through. Keep this container away from children and pets.   Drugs and Foods to Avoid:      Ask your doctor or pharmacist before using any other medicine, including over-the-counter medicines, vitamins, and herbal products. Warnings While Using This Medicine:   · Tell your doctor if you are breastfeeding, or if you have kidney disease, liver disease, digestion problems (including gastroparesis), gallbladder disease, or a history of pancreas problems, depression, or angioedema (swelling of the arms, face, hands, mouth, or throat). · Do not use Saxenda® if you are also using Victoza®. They contain the same medicine. · This medicine may cause the following problems:   ¨ Increased risk for thyroid tumors  ¨ Pancreatitis  ¨ Low blood sugar  ¨ Kidney problems  ¨ Gallbladder problems, including gallstones  ¨ Thoughts of hurting yourself [place] Hilton)  · Your doctor will do lab tests at regular visits to check on the effects of this medicine. Keep all appointments. · Keep all medicine out of the reach of children. Never share your medicine with anyone.   Possible Side Effects While Using This Medicine:   Call your doctor right away if you notice any of these side effects:  · Allergic reaction: Itching or hives, swelling in your face or hands, swelling or tingling in your mouth or throat, chest tightness, trouble breathing  · Change in how much or how often you urinate, painful or burning urination  · Feeling sad or depressed, thoughts of suicide, unusual changes in mood or behavior  · Shaking, trembling, sweating, fast or pounding heartbeat, fainting, hunger, confusion  · Sudden and severe stomach pain, nausea, vomiting, fever, lightheadedness  · Trouble breathing or swallowing, a lump in your neck, hoarseness when speaking  · Yellow skin or eyes  If you notice these less serious side effects, talk with your doctor:   · Decreased appetite  · Diarrhea, constipation, stomach upset  · Dizziness  · Headache  · Redness, itching, swelling, or any changes in your skin where the shot was given  If you notice other side effects that you think are caused by this medicine, tell your doctor. Call your doctor for medical advice about side effects. You may report side effects to FDA at 8-154-BTL-7558  © 2017 [facility] Information is for End User's use only and may not be sold, redistributed or otherwise used for commercial purposes. The above information is an  only. It is not intended as medical advice for individual conditions or treatments. Talk to your doctor, nurse or pharmacist before following any medical regimen to see if it is safe and effective for you.

## 2018-03-01 NOTE — PROGRESS NOTES
Chief Complaint   Patient presents with    Medication Evaluation     Patient stated medication check is for phentermine 2 month follow up    Weight Management     2 month follow up         HPI:  The patient is a 74 y.o. female who presents today for a follow up appointment. No hospital, ER or specialist visits since last primary care visit except as noted below. Weight Mgmt:  The patient is a former patient of mine last seen at [address] [place] [address]. GBP Surgery August 20, 2012 with Dr. Stephanie Noble. Preop weight 253 pounds. Her daughter in law was supposed to have a gastric balloon placed in October at the [place]. She never had this done because they found something during the procedure they needed to address and they postponed it. She has been 147-150 pounds for years. At her visit on 10/16/2017 it had increased to 150-152 pounds. Her goal weight is 140-150 pounds. She is 5 years postop. She had been on Phentermine daily with good effectiveness. She also tried Contrave but felt she was not feeling well, nausea, \"felt so bad\" and just wanted to sit around. She was unable to tolerate the medication and ultimately she stopped taking it after about two weeks. Stopped 2 weeks approximately about one month ago then restarted Phentermine. Today her weight is 159 pounds with a BMI of 27.74. She has gained 6 pounds since her last visit approximately 3 months ago. She stated that over the holidays she began eating [place] shortbread cookies and it became a habit. She reports good hunger control during the day but at night around 8pm she is hungry and wants something. Her last labs were in July 2017. Taking nutritional supplements as recommended. Exercise: Not very much. She is struggling with insomnia in the form of waking early. She is sleeping about 5 hours. She was prescribed Ambien about a year ago by her PCP. She denies N/V, GERD, night time regurgitation, abdominal pain.   She c/o constipation relieved with Linzess and Miralax with relief. She is taking in about 48-50+ fluids daily. Review of Systems  A comprehensive review of systems was negative except for that written in the HPI. Patient Active Problem List   Diagnosis Code    Asthma J45.909    Sleep apnea G47.30    HTN     Macular degeneration H35.30    Elevated LFT's     Left sided sciatica M54.32    Allergic rhinitis J30.9    Hypothyroidism E03.9    Depression F32.9    Obesity E66.9    Vitamin D deficiency E55.9    Bariatric surgery status Z98.84       Past Medical History:   Diagnosis Date    Allergic rhinitis 1/28/2009    Asthma 1/28/2009    Dental disorder NEC     DM (diabetes mellitus) ([address] 1/28/2009    Elevated LFT's 1/28/2009    HTN 1/28/2009    CONTROLLED BY MEDS.     Hypothyroidism 1/28/2009    Left sided sciatica 1/28/2009    Macular degeneration 1/28/2009    Morbid obesity ([facility]     Musculoskeletal disorder     Sleep apnea 1/28/2009    USES C-PAP    Thyroid disease     HYPOTHYROIDISM       Past Surgical History:   Procedure Laterality Date    BREAST SURGERY PROCEDURE UNLISTED      THINKS ITS RIGHT BREAST.    HX BREAST BIOPSY Bilateral 1997    neg./ ? laterality    HX GI      COLONOSCOPY X 1    HX GYN      TOTAL VAGINAL HYSTERECTOMY    HX GYN      EXPLOROTORY FOR ECTOPIC PREGNANCY (uterine horn) BTL., DNC    HX ORTHOPAEDIC  1980    carpal tunnel release       Social History   Substance Use Topics    Smoking status: Former Smoker     Packs/day: 1.00     Years: 6.00     Quit date: 1/28/1969    Smokeless tobacco: Never Used      Comment: 1966    Alcohol use 1.0 oz/week     1 Glasses of wine, 1 Shots of liquor per week      Comment: rare       Family History   Problem Relation Age of Onset    Macular Degen Mother     Hypertension Mother     Cancer Father      lung    Hypertension Father     Breast Cancer Sister 72    Hypertension Brother        Outpatient Prescriptions Marked as Taking for the 3/1/18 encounter (Office Visit) with Luisa Faustin NP   Medication Sig Dispense Refill    phentermine 37.5 mg capsule Take 37.5 mg by mouth every morning. Max Daily Amount: 37.5 mg. 30 Cap 1    albuterol (PROAIR HFA) 90 mcg/actuation inhaler Take 2 Puffs by inhalation every four (4) hours as needed for Wheezing. 1 Inhaler 1    montelukast (SINGULAIR) 10 mg tablet Take 1 Tab by mouth daily. Indications: Allergic Rhinitis, MAINTENANCE THERAPY FOR ASTHMA 30 Tab 1    Levothyroxine 137 mcg cap Take  by mouth.  lisinopril-hydroCHLOROthiazide (PRINZIDE, ZESTORETIC) 10-12.5 mg per tablet Take  by mouth daily.  venlafaxine (EFFEXOR) 37.5 mg tablet Take 37.5 mg by mouth three (3) times daily.  cyanocobalamin (VITAMIN B-12) 500 mcg tablet Take 500 mcg by mouth daily.  linaclotide (LINZESS) 290 mcg cap capsule Take  by mouth Daily (before breakfast).  multivitamin (ONE A DAY) tablet Take 1 Tab by mouth daily.  Vit A,C,E-Zinc-Copper (PRESERVISION AREDS) Cap capsule Take 1 Cap by mouth two (2) times a day. BREAKFAST & BED TIME.           Allergies   Allergen Reactions    Cat Dander Shortness of Breath    Dog Dander Shortness of Breath    Mold Shortness of Breath    Other Plant, Animal, Environmental Shortness of Breath     Dust       PE:  Visit Vitals    /61 (BP 1 Location: Left arm, BP Patient Position: Sitting)    Pulse 69    Temp 97.3 °F (36.3 °C) (Oral)    Resp 18    Ht 5' 3.5\" (1.613 m)    Wt 159 lb 1.6 oz (72.2 kg)    SpO2 94%    BMI 27.74 kg/m2     Gen: alert, oriented, no acute distress  Head: normocephalic, atraumatic  Ears: external auditory canals clear, TMs without erythema or effusion  Eyes: pupils equal round reactive to light, sclera clear, conjunctiva clear  Oral: moist mucus membranes, no oral lesions, no pharyngeal inflammation or exudate  Neck: symmetric normal sized thyroid, no carotid bruits, no jugular vein distention  Resp: no increase work of breathing, lungs clear to ausculation bilaterally, no wheezing, rales or rhonchi  CV: S1, S2 normal.  No murmurs, rubs, or gallops. Abd: soft, not tender, not distended. No hepatosplenomegaly. Normal bowel sounds. No hernias. No abdominal or renal bruits. Neuro: cranial nerves intact, normal strength and movement in all extremities, reflexes and sensation intact and symmetric. Skin: no lesion or rash  Extremities: no cyanosis or edema    Assessment/Plan:    ICD-10-CM ICD-9-CM    1. Obesity, unspecified classification, unspecified obesity type, unspecified whether serious comorbidity present E66.9 278.00 phentermine 37.5 mg capsule      liraglutide (SAXENDA) 3 mg/0.5 mL (18 mg/3 mL) pen      pen needle, diabetic (NOVOTWIST) 32 gauge x 1/5\" ndle      Oral Medication Containers (SHARPS CONTAINER) misc   2. Bariatric surgery status Z98.84 V45.86 phentermine 37.5 mg capsule      liraglutide (SAXENDA) 3 mg/0.5 mL (18 mg/3 mL) pen      pen needle, diabetic (NOVOTWIST) 32 gauge x 1/5\" ndle      Oral Medication Containers (SHARPS CONTAINER) misc     Follow-up Disposition:  Return in about 4 weeks (around 3/29/2018) for Medication Check, Weight Mgmt.  lab results and schedule of future lab studies reviewed with patient  reviewed diet, exercise and weight control  reviewed medications and side effects in detail    Saxenda if coverage, Wellbutrin alone, or Belviq just every day x 1 month, if tolerated increase to BID (? SS risk with Effexor)    Recommended healthy diet low in carbohydrates, fats, sodium and cholesterol. Recommended regular cardiovascular exercise 3-6 times per week for 30-60 minutes daily. Chart is reviewed and updated today in the office. Records requested for other providers patient has seen and is currently seeing. Patient was offered a choice/choices in the treatment plan today. Patient expresses understanding of the plan and agrees with recommendations. Verbal and written instructions (see AVS) provided.  See patient instructions for more. Patient expresses understanding of diagnosis and treatment plan.

## 2018-03-01 NOTE — MR AVS SNAPSHOT
[address] [facility], [place] 
 
 
 [address] [facility] 
[address] [place] [address] 
[phone] Patient: Clay Adams MRN: [id number] ALS:[id number] Visit Information Date & Time Provider Department Dept. Phone Encounter #  
 3/1/2018  8:20 AM Severo Prosper, NP [facility] [phone] [id number] Follow-up Instructions Return in about 4 weeks (around 3/29/2018) for Medication Check, Weight Mgmt. Follow-up and Disposition History Upcoming Health Maintenance Date Due COLONOSCOPY 8/13/2018 HEMOGLOBIN A1C Q6M 6/12/2018 LIPID PANEL Q1 7/19/2018 FOOT EXAM Q1 10/19/2018 MICROALBUMIN Q1 10/19/2018 MEDICARE YEARLY EXAM 12/13/2018 GLAUCOMA SCREENING Q2Y 8/9/2019 BREAST CANCER SCRN MAMMOGRAM 8/29/2019 DTaP/Tdap/Td series (2 - Td) 11/14/2022 Allergies as of 3/1/2018  Review Complete On: 3/1/2018 By: Severo Prosper, NP Severity Noted Reaction Type Reactions Cat Dander High 10/16/2017    Shortness of Breath Dog Dander High 10/16/2017    Shortness of Breath Mold High 10/16/2017    Shortness of Breath Other Plant, Animal, Environmental High 10/16/2017    Shortness of Breath Dust  
  
Current Immunizations  Reviewed on 12/12/2017 No immunizations on file. Not reviewed this visit You Were Diagnosed With   
  
 Codes Comments Obesity, unspecified classification, unspecified obesity type, unspecified whether serious comorbidity present     ICD-10-CM: E66.9 ICD-9-CM: 278.00 Bariatric surgery status     ICD-10-CM: Z98.84 ICD-9-CM: V45.86 Vitals BP Pulse Temp Resp Height(growth percentile) Weight(growth percentile) 128/61 (BP 1 Location: Left arm, BP Patient Position: Sitting) 69 97.3 °F (36.3 °C) (Oral) 18 5' 3.5\" (1.613 m) 159 lb 1.6 oz (72.2 kg) SpO2 BMI OB Status Smoking Status 94% 27.74 kg/m2 Hysterectomy Former Smoker Vitals History BMI and BSA Data Body Mass Index Body Surface Area  
 27.74 kg/m 2 1.8 m 2 Preferred Pharmacy Pharmacy Name Phone [id number] Angela Cordero, [address] [facility] [phone] Your Updated Medication List  
  
   
This list is accurate as of 3/1/18  9:47 AM.  Always use your most recent med list.  
  
  
  
  
 albuterol 90 mcg/actuation inhaler Commonly known as:  PROAIR HFA Take 2 Puffs by inhalation every four (4) hours as needed for Wheezing. calcium 500 mg Tab Take  by mouth. Levothyroxine 137 mcg Cap Take  by mouth. LINZESS 290 mcg Cap capsule Generic drug:  linaclotide Take  by mouth Daily (before breakfast). liraglutide 3 mg/0.5 mL (18 mg/3 mL) pen Commonly known as:  Ronel Edi Inject 0.6 x 1 week, then 1.2 x 1 week, then 1.8 x 1 week, then 2.4 x 1 week, then 3 x 1week  
  
 lisinopril-hydroCHLOROthiazide 10-12.5 mg per tablet Commonly known as:  Wylene Mings Take  by mouth daily. montelukast 10 mg tablet Commonly known as:  SINGULAIR Take 1 Tab by mouth daily. Indications: Allergic Rhinitis, MAINTENANCE THERAPY FOR ASTHMA  
  
 multivitamin tablet Commonly known as:  ONE A DAY Take 1 Tab by mouth daily. omeprazole 20 mg capsule Commonly known as:  PRILOSEC Take 20 mg by mouth daily. Oral Medication Containers Misc Commonly known as:  SHARPS CONTAINER  
1 Container by Does Not Apply route daily. pen needle, diabetic 32 gauge x 1/5\" Ndle Commonly known as:  NOVOTWIST  
30 UNSPECIFIED by Does Not Apply route daily. phentermine 37.5 mg capsule Take 37.5 mg by mouth every morning. Max Daily Amount: 37.5 mg. PRESERVISION AREDS Cap capsule Generic drug:  Vit A,C,E-Zinc-Copper Take 1 Cap by mouth two (2) times a day. BREAKFAST & BED TIME. simvastatin 40 mg tablet Commonly known as:  ZOCOR  
TAKE 1 TABLET BY MOUTH NIGHTLY  
  
 venlafaxine 37.5 mg tablet Commonly known as:  [facility] Take 37.5 mg by mouth three (3) times daily. VITAMIN B-12 500 mcg tablet Generic drug:  cyanocobalamin Take 500 mcg by mouth daily. Prescriptions Printed Refills  
 phentermine 37.5 mg capsule 1 Sig: Take 37.5 mg by mouth every morning. Max Daily Amount: 37.5 mg.  
 Class: Print Route: Oral  
  
Prescriptions Sent to Pharmacy Refills  
 liraglutide (SAXENDA) 3 mg/0.5 mL (18 mg/3 mL) pen 0 Sig: Inject 0.6 x 1 week, then 1.2 x 1 week, then 1.8 x 1 week, then 2.4 x 1 week, then 3 x 1week Class: Normal  
 Pharmacy: [address] Ph #: [phone]  
 pen needle, diabetic (NOVOTWIST) 32 gauge x 1/5\" ndle 2 Si UNSPECIFIED by Does Not Apply route daily. Class: Normal  
 Pharmacy: [address] Ph #: [phone] Route: Does Not Apply Oral Medication Containers (SHARPS CONTAINER) misc 2 Si Container by Does Not Apply route daily. Class: Normal  
 Pharmacy: [address] Ph #: [phone] Route: Does Not Apply Follow-up Instructions Return in about 4 weeks (around 3/29/2018) for Medication Check, Weight Mgmt. Patient Instructions Learning About Low-Carbohydrate Diets for Weight Loss What is a low-carbohydrate diet? Low-carb diets avoid foods that are high in carbohydrate. These high-carb foods include pasta, bread, rice, cereal, fruits, and starchy vegetables. Instead, these diets usually have you eat foods that are high in fat and protein. Many people lose weight quickly on a low-carb diet. But the early weight loss is water. People on this diet often gain the weight back after they start eating carbs again. Not all diet plans are safe or work well.  A lot of the evidence shows that low-carb diets aren't healthy. That's because these diets often don't include healthy foods like fruits and vegetables. Losing weight safely means balancing protein, fat, and carbs with every meal and snack. And low-carb diets don't always provide the vitamins, minerals, and fiber you need. If you have a serious medical condition, talk to your doctor before you try any diet. These conditions include kidney disease, heart disease, type 2 diabetes, high cholesterol, and high blood pressure. If you are pregnant, it may not be safe for your baby if you are on a low-carb diet. How can you lose weight safely? You might have heard that a diet plan helped another person lose weight. But that doesn't mean that it will work for you. It is very hard to stay on a diet that includes lots of big changes in your eating habits. If you want to get to a healthy weight and stay there, making healthy lifestyle changes will often work better than dieting. These steps can help. · Make a plan for change. Work with your doctor to create a plan that is right for you. · See a dietitian. He or she can show you how to make healthy changes in your eating habits. · Manage stress. If you have a lot of stress in your life, it can be hard to focus on making healthy changes to your daily habits. · Track your food and activity. You are likely to do better at losing weight if you keep track of what you eat and what you do. Follow-up care is a key part of your treatment and safety. Be sure to make and go to all appointments, and call your doctor if you are having problems. It's also a good idea to know your test results and keep a list of the medicines you take. Where can you learn more? Go to http://dileep-tuyet.info/. Enter A121 in the search box to learn more about \"Learning About Low-Carbohydrate Diets for Weight Loss. \" Current as of: May 12, 2017 Content Version: 11.4 © 3550-0327 [employer]. Care instructions adapted under license by [employer] (which disclaims liability or warranty for this information). If you have questions about a medical condition or this instruction, always ask your healthcare professional. [address] any warranty or liability for your use of this information. Liraglutide (By injection) Liraglutide (oue-r-CIIW-tide) Treats type 2 diabetes and helps with weight loss in certain patients. Also reduces the risk of heart attacks and strokes in patients with type 2 diabetes and heart or blood vessel disease. Brand Name(s): Luz Lucio There may be other brand names for this medicine. When This Medicine Should Not Be Used: This medicine is not right for everyone. Do not use it if you had an allergic reaction to liraglutide, or if you have multiple endocrine neoplasia syndrome type 2 (MEN 2) or if you or anyone in your family had medullary thyroid cancer. Tell your doctor if you are pregnant or have become pregnant while you are using this medicine. How to Use This Medicine:  
Injectable · Your doctor will prescribe your exact dose and tell you how often it should be given. This medicine is given as a shot under the skin of your stomach, thighs, or upper arms. · If you use insulin in addition to this medicine, do not mix them into the same syringe. You may give the shots in the same area (including your stomach), but do not give the shots right next to each other. · You may be taught how to give your medicine at home. Make sure you understand all instructions before giving yourself an injection. Do not use more medicine or use it more often than your doctor tells you to. · Check the liquid in the pen. It should be clear and colorless. Do not use it if it is cloudy, discolored, or has particles in it. · You will be shown the body areas where this shot can be given.  Use a different body area each time you give yourself a shot. Keep track of where you give each shot to make sure you rotate body areas. · Use a new needle and syringe each time you inject your medicine. · Never share medicine pens with others under any circumstances. Sharing needles or pens can result in transmission of infection. · Drink extra fluids so you will urinate more often and help prevent kidney problems. · This medicine should come with a Medication Guide. Ask your pharmacist for a copy if you do not have one. · Missed dose: If you miss a dose of this medicine, use it as soon as you remember. Then take your next dose at your usual time. Never take extra medicine to make up for a missed dose. If you miss a dose for 3 days or more, call your doctor to talk about how to restart your treatment. · Store your new, unused medicine pen in its original carton in the refrigerator. Protect it from light. Do not freeze this medicine or use it if it has been frozen. You may store the opened medicine pen in the refrigerator or at room temperature for 30 days. Throw away your used pen after 30 days, even if it still has medicine in it. Always remove the needle from the pen before you store it. · Throw away used needles in a hard, closed container that the needles cannot poke through. Keep this container away from children and pets. Drugs and Foods to Avoid: Ask your doctor or pharmacist before using any other medicine, including over-the-counter medicines, vitamins, and herbal products. Warnings While Using This Medicine: · Tell your doctor if you are breastfeeding, or if you have kidney disease, liver disease, digestion problems (including gastroparesis), gallbladder disease, or a history of pancreas problems, depression, or angioedema (swelling of the arms, face, hands, mouth, or throat). · Do not use Saxenda® if you are also using Victoza®. They contain the same medicine. · This medicine may cause the following problems:  
¨ Increased risk for thyroid tumors ¨ Pancreatitis ¨ Low blood sugar ¨ Kidney problems ¨ Gallbladder problems, including gallstones ¨ Thoughts of hurting yourself Rekha Womack) · Your doctor will do lab tests at regular visits to check on the effects of this medicine. Keep all appointments. · Keep all medicine out of the reach of children. Never share your medicine with anyone. Possible Side Effects While Using This Medicine:  
Call your doctor right away if you notice any of these side effects: · Allergic reaction: Itching or hives, swelling in your face or hands, swelling or tingling in your mouth or throat, chest tightness, trouble breathing · Change in how much or how often you urinate, painful or burning urination · Feeling sad or depressed, thoughts of suicide, unusual changes in mood or behavior · Shaking, trembling, sweating, fast or pounding heartbeat, fainting, hunger, confusion · Sudden and severe stomach pain, nausea, vomiting, fever, lightheadedness · Trouble breathing or swallowing, a lump in your neck, hoarseness when speaking · Yellow skin or eyes If you notice these less serious side effects, talk with your doctor: · Decreased appetite · Diarrhea, constipation, stomach upset · Dizziness · Headache · Redness, itching, swelling, or any changes in your skin where the shot was given If you notice other side effects that you think are caused by this medicine, tell your doctor. Call your doctor for medical advice about side effects. You may report side effects to FDA at 8-743-FDA-6777 © 2017 [facility] Information is for End User's use only and may not be sold, redistributed or otherwise used for commercial purposes. The above information is an  only. It is not intended as medical advice for individual conditions or treatments.  Talk to your doctor, nurse or pharmacist before following any medical regimen to see if it is safe and effective for you. Patient Instructions History Introducing [facility]! Dear Vania Landis: Thank you for requesting a [employer] account. Our records indicate that you already have an active The[employer] account. You can access your account anytime at [email or web address] Did you know that you can access your hospital and ER discharge instructions at any time in The[employer]Tool? You can also review all of your test results from your hospital stay or ER visit. Additional Information If you have questions, please visit the Frequently Asked Questions section of the [employer] website at [email or web address] Remember, TheFormTool is NOT to be used for urgent needs. For medical emergencies, dial 911. Now available from your iPhone and Android! Please provide this summary of care documentation to your next provider. Your primary care clinician is listed as Lalit Gerardo. If you have any questions after today's visit, please call [phone].

## 2018-03-01 NOTE — PROGRESS NOTES
Chief Complaint   Patient presents with    Medication Evaluation     Patient stated medication check is for phentermine 2 month follow up    Weight Management     2 month follow up     1. Have you been to the ER, urgent care clinic since your last visit? Hospitalized since your last visit? NO    2. Have you seen or consulted any other health care providers outside of the [address] [place] [address] since your last visit? Include any pap smears or colon screening.  NO

## 2018-03-05 RX ORDER — BUPROPION HYDROCHLORIDE 150 MG/1
150 TABLET, EXTENDED RELEASE ORAL 2 TIMES DAILY
Qty: 60 TAB | Refills: 1 | Status: SHIPPED | OUTPATIENT
Start: 2018-03-05 | End: 2018-04-05 | Stop reason: ALTCHOICE

## 2018-03-05 RX ORDER — MONTELUKAST SODIUM 10 MG/1
10 TABLET ORAL DAILY
Qty: 30 TAB | Refills: 1 | Status: SHIPPED | OUTPATIENT
Start: 2018-03-05 | End: 2018-05-25 | Stop reason: SDUPTHER

## 2018-04-05 ENCOUNTER — OFFICE VISIT (OUTPATIENT)
Dept: FAMILY MEDICINE CLINIC | Age: 73
End: 2018-04-05

## 2018-04-05 VITALS
BODY MASS INDEX: 26.12 KG/M2 | WEIGHT: 153 LBS | DIASTOLIC BLOOD PRESSURE: 65 MMHG | SYSTOLIC BLOOD PRESSURE: 119 MMHG | RESPIRATION RATE: 16 BRPM | HEART RATE: 70 BPM | TEMPERATURE: 97.7 F | HEIGHT: 64 IN | OXYGEN SATURATION: 95 %

## 2018-04-05 DIAGNOSIS — E66.9 OBESITY, UNSPECIFIED CLASSIFICATION, UNSPECIFIED OBESITY TYPE, UNSPECIFIED WHETHER SERIOUS COMORBIDITY PRESENT: Primary | ICD-10-CM

## 2018-04-05 DIAGNOSIS — Z98.84 BARIATRIC SURGERY STATUS: ICD-10-CM

## 2018-04-05 DIAGNOSIS — R11.2 NON-INTRACTABLE VOMITING WITH NAUSEA, UNSPECIFIED VOMITING TYPE: ICD-10-CM

## 2018-04-05 RX ORDER — ONDANSETRON 4 MG/1
4 TABLET, ORALLY DISINTEGRATING ORAL
Qty: 30 TAB | Refills: 0 | Status: SHIPPED | OUTPATIENT
Start: 2018-04-05 | End: 2018-05-11 | Stop reason: ALTCHOICE

## 2018-04-05 NOTE — PATIENT INSTRUCTIONS
Nausea and Vomiting: Care Instructions  Your Care Instructions    When you are nauseated, you may feel weak and sweaty and notice a lot of saliva in your mouth. Nausea often leads to vomiting. Most of the time you do not need to worry about nausea and vomiting, but they can be signs of other illnesses. Two common causes of nausea and vomiting are stomach flu and food poisoning. Nausea and vomiting from viral stomach flu will usually start to improve within 24 hours. Nausea and vomiting from food poisoning may last from 12 to 48 hours. The doctor has checked you carefully, but problems can develop later. If you notice any problems or new symptoms, get medical treatment right away. Follow-up care is a key part of your treatment and safety. Be sure to make and go to all appointments, and call your doctor if you are having problems. It's also a good idea to know your test results and keep a list of the medicines you take. How can you care for yourself at home? · To prevent dehydration, drink plenty of fluids, enough so that your urine is light yellow or clear like water. Choose water and other caffeine-free clear liquids until you feel better. If you have kidney, heart, or liver disease and have to limit fluids, talk with your doctor before you increase the amount of fluids you drink. · Rest in bed until you feel better. · When you are able to eat, try clear soups, mild foods, and liquids until all symptoms are gone for 12 to 48 hours. Other good choices include dry toast, crackers, cooked cereal, and gelatin dessert, such as Jell-O. When should you call for help? Call 911 anytime you think you may need emergency care. For example, call if:  ? · You passed out (lost consciousness). ?Call your doctor now or seek immediate medical care if:  ? · You have symptoms of dehydration, such as:  ¨ Dry eyes and a dry mouth. ¨ Passing only a little dark urine.   ¨ Feeling thirstier than usual.   ? · You have new or worsening belly pain. ? · You have a new or higher fever. ? · You vomit blood or what looks like coffee grounds. ? Watch closely for changes in your health, and be sure to contact your doctor if:  ? · You have ongoing nausea and vomiting. ? · Your vomiting is getting worse. ? · Your vomiting lasts longer than 2 days. ? · You are not getting better as expected. Where can you learn more? Go to http://dileep-tuyet.info/. Enter 25 [id number] in the search box to learn more about \"Nausea and Vomiting: Care Instructions. \"  Current as of: March 20, 2017  Content Version: 11.4  © 1118-4271 [employer] Care instructions adapted under license by [employer] (which disclaims liability or warranty for this information). If you have questions about a medical condition or this instruction, always ask your healthcare professional. [address] any warranty or liability for your use of this information.

## 2018-04-05 NOTE — PROGRESS NOTES
Body Weight:153 lb  Body Fat:35.1  Muscle Mass:27.1  Body H2O:46.9  BMR:1176  BMI:25.9  Waist Circumference:35\"  Weight Lost/Gained since the last visit:Lost 6 lb 1.6 oz  Weight Lost since Surgery (if applicable): 295 lb. Satinder Call  Identified pt with two pt identifiers(name and ). Chief Complaint   Patient presents with    Weight Management       1. Have you been to the ER, urgent care clinic since your last visit? Hospitalized since your last visit? NO    2. Have you seen or consulted any other health care providers outside of the [address] [place] [address] since your last visit? Include any pap smears or colon screening. NO    Today's provider has been notified of reason for visit, vitals and flowsheets obtained on patients.      Patient received paperwork for advance directive during previous visit but has not completed at this time     Reviewed record In preparation for visit, huddled with provider and have obtained necessary documentation      Health Maintenance Due   Topic    COLONOSCOPY        Wt Readings from Last 3 Encounters:   18 153 lb (69.4 kg)   18 159 lb 1.6 oz (72.2 kg)   17 153 lb 12.8 oz (69.8 kg)     Temp Readings from Last 3 Encounters:   18 97.3 °F (36.3 °C) (Oral)   17 97.2 °F (36.2 °C) (Oral)   10/16/17 96.6 °F (35.9 °C) (Oral)     BP Readings from Last 3 Encounters:   18 128/61   17 137/68   10/16/17 130/58     Pulse Readings from Last 3 Encounters:   18 69   17 75   10/16/17 66     Vitals:    18 1116   Weight: 153 lb (69.4 kg)   Height: 5' 4\" (1.626 m)   PainSc:   0 - No pain         Learning Assessment:  :     Learning Assessment 2018   PRIMARY LEARNER Patient   HIGHEST LEVEL OF EDUCATION - PRIMARY LEARNER  2 YEARS OF COLLEGE   BARRIERS PRIMARY LEARNER VISUAL   CO-LEARNER CAREGIVER No   PRIMARY LANGUAGE ENGLISH   LEARNER PREFERENCE PRIMARY READING   ANSWERED BY Patient   RELATIONSHIP SELF       Depression Screening:  :     PHQ over the last two weeks 12/12/2017   Little interest or pleasure in doing things Not at all   Feeling down, depressed or hopeless Not at all   Total Score PHQ 2 0   Trouble falling or staying asleep, or sleeping too much Not at all   Feeling tired or having little energy Not at all   Poor appetite or overeating Not at all   Feeling bad about yourself - or that you are a failure or have let yourself or your family down Not at all   Trouble concentrating on things such as school, work, reading or watching TV Not at all   Moving or speaking so slowly that other people could have noticed; or the opposite being so fidgety that others notice Not at all   Thoughts of being better off dead, or hurting yourself in some way Not at all   PHQ 9 Score 0       Fall Risk Assessment:  :     Fall Risk Assessment, last 12 mths 3/1/2018   Able to walk? Yes   Fall in past 12 months? No       Abuse Screening:  :     Abuse Screening Questionnaire 12/12/2017 7/31/2014   Do you ever feel afraid of your partner? N N   Are you in a relationship with someone who physically or mentally threatens you? N N   Is it safe for you to go home?  Y Y       ADL Screening:  :     ADL Assessment 4/5/2018   Feeding yourself No Help Needed   Getting from bed to chair No Help Needed   Getting dressed No Help Needed   Bathing or showering No Help Needed   Walk across the room (includes cane/walker) No Help Needed   Using the telphone No Help Needed   Taking your medications No Help Needed   Preparing meals No Help Needed   Managing money (expenses/bills) No Help Needed   Moderately strenuous housework (laundry) No Help Needed   Shopping for personal items (toiletries/medicines) No Help Needed   Shopping for groceries No Help Needed   Driving No Help Needed   Climbing a flight of stairs No Help Needed   Getting to places beyond walking distances No Help Needed                 Medication reconciliation up to date and corrected with patient at this time.

## 2018-04-05 NOTE — PROGRESS NOTES
Chief Complaint   Patient presents with    Weight Management         HPI:  The patient is a 74 y.o. female who presents today for a follow up appointment. No hospital, ER or specialist visits since last primary care visit except as noted below. Weight Mgmt:  The patient is a former patient of mine last seen at [address] [place] [address]. GBP Surgery August 20, 2012 with Dr. Dayton Lorenzo. Preop weight 253 pounds. Her daughter in law was supposed to have a gastric balloon placed in October at the [place]. She never had this done because they found something during the procedure they needed to address and they postponed it. She has been 147-150 pounds for years. At her visit on 10/16/2017 it had increased to 150-152 pounds. Her goal weight is 140-150 pounds. She is 5 years postop. She had been on Phentermine daily with good effectiveness. She also tried Contrave but felt she was not feeling well, nausea, \"felt so bad\" and just wanted to sit around. She was unable to tolerate the medication and ultimately she stopped taking it after about two weeks. Stopped 2 weeks approximately about one month ago then restarted Phentermine. Today her weight is 153 pounds with a BMI of 26.26. She has lost 6 pounds since her last visit. Her last labs were in July 2017. Taking nutritional supplements as recommended. Exercise: Not very much. She started the Saxenda sample and the first week was \"great\", her hunger was well controlled. The second week she developed significant N/V and stopped the medication after the second week. She ran out of her Phentermine approximately 1 weeks ago. She did not start the Wellbutrin yet. She denies N/V unrelated to medication, GERD, night time regurgitation, abdominal pain. She c/o constipation relieved with Linzess and Miralax with relief. She is taking in about 48-50+ fluids daily. Review of Systems  A comprehensive review of systems was negative except for that written in the HPI.     Patient Active Problem List   Diagnosis Code    Asthma J45.909    Sleep apnea G47.30    HTN     Macular degeneration H35.30    Elevated LFT's     Left sided sciatica M54.32    Allergic rhinitis J30.9    Hypothyroidism E03.9    Depression F32.9    Obesity E66.9    Vitamin D deficiency E55.9    Bariatric surgery status Z98.84       Past Medical History:   Diagnosis Date    Allergic rhinitis 1/28/2009    Asthma 1/28/2009    Dental disorder NEC     DM (diabetes mellitus) ([address] 1/28/2009    Elevated LFT's 1/28/2009    HTN 1/28/2009    CONTROLLED BY MEDS.     Hypothyroidism 1/28/2009    Left sided sciatica 1/28/2009    Macular degeneration 1/28/2009    Morbid obesity ([address]     Musculoskeletal disorder     Sleep apnea 1/28/2009    USES C-PAP    Thyroid disease     HYPOTHYROIDISM       Past Surgical History:   Procedure Laterality Date    BREAST SURGERY PROCEDURE UNLISTED      THINKS ITS RIGHT BREAST.    HX BREAST BIOPSY Bilateral 1997    neg./ ? laterality    HX GI      COLONOSCOPY X 1    HX GYN      TOTAL VAGINAL HYSTERECTOMY    HX GYN      EXPLOROTORY FOR ECTOPIC PREGNANCY (uterine horn) BTL., DNC    HX ORTHOPAEDIC  1980    carpal tunnel release       Social History   Substance Use Topics    Smoking status: Former Smoker     Packs/day: 1.00     Years: 6.00     Quit date: 1/28/1969    Smokeless tobacco: Never Used      Comment: 1966    Alcohol use 1.0 oz/week     1 Glasses of wine, 1 Shots of liquor per week      Comment: rare       Family History   Problem Relation Age of Onset    Macular Degen Mother     Hypertension Mother     Cancer Father      lung    Hypertension Father     Breast Cancer Sister 72    Hypertension Brother        Outpatient Prescriptions Marked as Taking for the 4/5/18 encounter (Office Visit) with Tri Mckeon NP   Medication Sig Dispense Refill    liraglutide (SAXENDA) 3 mg/0.5 mL (18 mg/3 mL) pen Inject 0.6 x 1 week, then 1.2 x 1 week, then 1.8 x 1 week, then 2.4 x 1 week, then 3 x 1week 1 Adjustable Dose Pre-filled Pen Syringe 0    ondansetron (ZOFRAN ODT) 4 mg disintegrating tablet Take 1 Tab by mouth every eight (8) hours as needed for Nausea. 30 Tab 0    montelukast (SINGULAIR) 10 mg tablet Take 1 Tab by mouth daily. Indications: Allergic Rhinitis, MAINTENANCE THERAPY FOR ASTHMA 30 Tab 1    albuterol (PROAIR HFA) 90 mcg/actuation inhaler Take 2 Puffs by inhalation every four (4) hours as needed for Wheezing. 1 Inhaler 1    Levothyroxine 137 mcg cap Take  by mouth daily.  lisinopril-hydroCHLOROthiazide (PRINZIDE, ZESTORETIC) 10-12.5 mg per tablet Take  by mouth daily.  venlafaxine (EFFEXOR) 37.5 mg tablet Take 37.5 mg by mouth daily.  cyanocobalamin (VITAMIN B-12) 500 mcg tablet Take 500 mcg by mouth daily.  linaclotide (LINZESS) 290 mcg cap capsule Take  by mouth Daily (before breakfast).  multivitamin (ONE A DAY) tablet Take 1 Tab by mouth daily.  calcium 500 mg Tab Take  by mouth.  Vit A,C,E-Zinc-Copper (PRESERVISION AREDS) Cap capsule Take 1 Cap by mouth two (2) times a day. BREAKFAST & BED TIME.           Allergies   Allergen Reactions    Cat Dander Shortness of Breath    Dog Dander Shortness of Breath    Mold Shortness of Breath    Other Plant, Animal, Environmental Shortness of Breath     Dust       PE:  Visit Vitals    /65 (BP 1 Location: Right arm, BP Patient Position: Sitting)    Pulse 70    Temp 97.7 °F (36.5 °C) (Oral)    Resp 16    Ht 5' 4\" (1.626 m)    Wt 153 lb (69.4 kg)    SpO2 95%    BMI 26.26 kg/m2     Gen: alert, oriented, no acute distress  Head: normocephalic, atraumatic  Ears: external auditory canals clear, TMs without erythema or effusion  Eyes: pupils equal round reactive to light, sclera clear, conjunctiva clear  Oral: moist mucus membranes, no oral lesions, no pharyngeal inflammation or exudate  Neck: symmetric normal sized thyroid, no carotid bruits, no jugular vein distention  Resp: no increase work of breathing, lungs clear to ausculation bilaterally, no wheezing, rales or rhonchi  CV: S1, S2 normal.  No murmurs, rubs, or gallops. Abd: soft, not tender, not distended. No hepatosplenomegaly. Normal bowel sounds. No hernias. No abdominal or renal bruits. Neuro: cranial nerves intact, normal strength and movement in all extremities, reflexes and sensation intact and symmetric. Skin: no lesion or rash  Extremities: no cyanosis or edema    Assessment/Plan:    ICD-10-CM ICD-9-CM    1. Obesity, unspecified classification, unspecified obesity type, unspecified whether serious comorbidity present E66.9 278.00    2. Bariatric surgery status Z98.84 V45.86    3. Non-intractable vomiting with nausea, unspecified vomiting type R11.2 787.01 ondansetron (ZOFRAN ODT) 4 mg disintegrating tablet     Follow-up Disposition:  Return in about 4 weeks (around 5/3/2018) for Medication Check, Weight Mgmt, Saxenda restart #1. lab results and schedule of future lab studies reviewed with patient  reviewed diet, exercise and weight control  reviewed medications and side effects in detail, Pt will stay with Saxenda at the 0.6mg daily dose at this time. Health Maintenance reviewed - reviewed, referred to PCP for f/u. Recommended healthy diet low in carbohydrates, fats, sodium and cholesterol. Recommended regular cardiovascular exercise 3-6 times per week for 30-60 minutes daily. Chart is reviewed and updated today in the office. Records requested for other providers patient has seen and is currently seeing. Patient was offered a choice/choices in the treatment plan today. Patient expresses understanding of the plan and agrees with recommendations. Verbal and written instructions (see AVS) provided. See patient instructions for more. Patient expresses understanding of diagnosis and treatment plan.

## 2018-04-05 NOTE — MR AVS SNAPSHOT
[address] 
[address] [place] [address] 
[phone] Patient: Bartolo Pereyra MRN: [id number] Q:[birth year]25 Visit Information Date & Time Provider Department Dept. Phone Encounter #  
 4/5/2018 11:00 AM Cezar Mackay NP [facility] [phone] [id number] Follow-up Instructions Return in about 4 weeks (around 5/3/2018) for Medication Check, Weight Mgmt, Saxenda restart #1. Upcoming Health Maintenance Date Due COLONOSCOPY 8/13/2018 HEMOGLOBIN A1C Q6M 6/12/2018 LIPID PANEL Q1 7/19/2018 FOOT EXAM Q1 10/19/2018 MICROALBUMIN Q1 10/19/2018 MEDICARE YEARLY EXAM 12/13/2018 GLAUCOMA SCREENING Q2Y 8/9/2019 BREAST CANCER SCRN MAMMOGRAM 8/29/2019 DTaP/Tdap/Td series (2 - Td) 11/14/2022 Allergies as of 4/5/2018  Review Complete On: 4/5/2018 By: Cezar Mackay NP Severity Noted Reaction Type Reactions Cat Dander High 10/16/2017    Shortness of Breath Dog Dander High 10/16/2017    Shortness of Breath Mold High 10/16/2017    Shortness of Breath Other Plant, Animal, Environmental High 10/16/2017    Shortness of Breath Dust  
  
Current Immunizations  Reviewed on 12/12/2017 No immunizations on file. Not reviewed this visit You Were Diagnosed With   
  
 Codes Comments Obesity, unspecified classification, unspecified obesity type, unspecified whether serious comorbidity present    -  Primary ICD-10-CM: E66.9 ICD-9-CM: 278.00 Bariatric surgery status     ICD-10-CM: Z98.84 ICD-9-CM: V45.86 Non-intractable vomiting with nausea, unspecified vomiting type     ICD-10-CM: R11.2 ICD-9-CM: 787.01 Vitals BP Pulse Temp Resp Height(growth percentile) Weight(growth percentile)  
 119/65 (BP 1 Location: Right arm, BP Patient Position: Sitting) 70 97.7 °F (36.5 °C) (Oral) 16 5' 4\" (1.626 m) 153 lb (69.4 kg) SpO2 BMI OB Status Smoking Status 95% 26.26 kg/m2 Hysterectomy Former Smoker Vitals History BMI and BSA Data Body Mass Index Body Surface Area  
 26.26 kg/m 2 1.77 m 2 Preferred Pharmacy Pharmacy Name Phone [id number] Angela [place]in, [address] [facility] [phone] Your Updated Medication List  
  
   
This list is accurate as of 4/5/18 11:48 AM.  Always use your most recent med list.  
  
  
  
  
 albuterol 90 mcg/actuation inhaler Commonly known as:  PROAIR HFA Take 2 Puffs by inhalation every four (4) hours as needed for Wheezing. calcium 500 mg Tab Take  by mouth. Levothyroxine 137 mcg Cap Take  by mouth daily. LINZESS 290 mcg Cap capsule Generic drug:  linaclotide Take  by mouth Daily (before breakfast). liraglutide 3 mg/0.5 mL (18 mg/3 mL) pen Commonly known as:  Amanda Arch Inject 0.6 x 1 week, then 1.2 x 1 week, then 1.8 x 1 week, then 2.4 x 1 week, then 3 x 1week  
  
 lisinopril-hydroCHLOROthiazide 10-12.5 mg per tablet Commonly known as:  Laila Dean Take  by mouth daily. montelukast 10 mg tablet Commonly known as:  SINGULAIR Take 1 Tab by mouth daily. Indications: Allergic Rhinitis, MAINTENANCE THERAPY FOR ASTHMA  
  
 multivitamin tablet Commonly known as:  ONE A DAY Take 1 Tab by mouth daily. ondansetron 4 mg disintegrating tablet Commonly known as:  ZOFRAN ODT Take 1 Tab by mouth every eight (8) hours as needed for Nausea. PRESERVISION AREDS Cap capsule Generic drug:  Vit A,C,E-Zinc-Copper Take 1 Cap by mouth two (2) times a day. BREAKFAST & BED TIME. venlafaxine 37.5 mg tablet Commonly known as:  [facility] Take 37.5 mg by mouth daily. VITAMIN B-12 500 mcg tablet Generic drug:  cyanocobalamin Take 500 mcg by mouth daily. Prescriptions Sent to Pharmacy  Refills  
 ondansetron (ZOFRAN ODT) 4 mg disintegrating tablet 0  
 Sig: Take 1 Tab by mouth every eight (8) hours as needed for Nausea. Class: Normal  
 Pharmacy: [facility] [address] [place] [address] James Costello  #: [phone] Route: Oral  
  
Follow-up Instructions Return in about 4 weeks (around 5/3/2018) for Medication Check, Weight Mgmt, Saxenda restart #1. Patient Instructions Nausea and Vomiting: Care Instructions Your Care Instructions When you are nauseated, you may feel weak and sweaty and notice a lot of saliva in your mouth. Nausea often leads to vomiting. Most of the time you do not need to worry about nausea and vomiting, but they can be signs of other illnesses. Two common causes of nausea and vomiting are stomach flu and food poisoning. Nausea and vomiting from viral stomach flu will usually start to improve within 24 hours. Nausea and vomiting from food poisoning may last from 12 to 48 hours. The doctor has checked you carefully, but problems can develop later. If you notice any problems or new symptoms, get medical treatment right away. Follow-up care is a key part of your treatment and safety. Be sure to make and go to all appointments, and call your doctor if you are having problems. It's also a good idea to know your test results and keep a list of the medicines you take. How can you care for yourself at home? · To prevent dehydration, drink plenty of fluids, enough so that your urine is light yellow or clear like water. Choose water and other caffeine-free clear liquids until you feel better. If you have kidney, heart, or liver disease and have to limit fluids, talk with your doctor before you increase the amount of fluids you drink. · Rest in bed until you feel better. · When you are able to eat, try clear soups, mild foods, and liquids until all symptoms are gone for 12 to 48 hours. Other good choices include dry toast, crackers, cooked cereal, and gelatin dessert, such as Jell-O. When should you call for help? Call 911 anytime you think you may need emergency care. For example, call if: 
? · You passed out (lost consciousness). ?Call your doctor now or seek immediate medical care if: 
? · You have symptoms of dehydration, such as: ¨ Dry eyes and a dry mouth. ¨ Passing only a little dark urine. ¨ Feeling thirstier than usual.  
? · You have new or worsening belly pain. ? · You have a new or higher fever. ? · You vomit blood or what looks like coffee grounds. ? Watch closely for changes in your health, and be sure to contact your doctor if: 
? · You have ongoing nausea and vomiting. ? · Your vomiting is getting worse. ? · Your vomiting lasts longer than 2 days. ? · You are not getting better as expected. Where can you learn more? Go to http://dileep-tuyet.info/. Enter 25 [id number] in the search box to learn more about \"Nausea and Vomiting: Care Instructions. \" Current as of: March 20, 2017 Content Version: 11.4 © 1171-8620 [employer]. Care instructions adapted under license by [employer] (which disclaims liability or warranty for this information). If you have questions about a medical condition or this instruction, always ask your healthcare professional. [address] any warranty or liability for your use of this information. Introducing [facility]! Dear Osvaldo Gunter: Thank you for requesting a GoodApril account. Our records indicate that you already have an active GoodApril account. You can access your account anytime at [email or web address] Did you know that you can access your hospital and ER discharge instructions at any time in GoodApril? You can also review all of your test results from your hospital stay or ER visit. Additional Information If you have questions, please visit the Frequently Asked Questions section of the [facility] website at [email or web address] Remember, [facility] is NOT to be used for urgent needs. For medical emergencies, dial 911. Now available from your iPhone and Android! Please provide this summary of care documentation to your next provider. Your primary care clinician is listed as Charanjit Priest. If you have any questions after today's visit, please call [phone].

## 2018-05-11 ENCOUNTER — OFFICE VISIT (OUTPATIENT)
Dept: FAMILY MEDICINE CLINIC | Age: 73
End: 2018-05-11

## 2018-05-11 VITALS
WEIGHT: 151 LBS | DIASTOLIC BLOOD PRESSURE: 70 MMHG | TEMPERATURE: 97.8 F | SYSTOLIC BLOOD PRESSURE: 121 MMHG | BODY MASS INDEX: 25.78 KG/M2 | HEART RATE: 86 BPM | HEIGHT: 64 IN | RESPIRATION RATE: 18 BRPM | OXYGEN SATURATION: 98 %

## 2018-05-11 DIAGNOSIS — Z98.84 BARIATRIC SURGERY STATUS: ICD-10-CM

## 2018-05-11 DIAGNOSIS — E66.9 OBESITY, UNSPECIFIED CLASSIFICATION, UNSPECIFIED OBESITY TYPE, UNSPECIFIED WHETHER SERIOUS COMORBIDITY PRESENT: Primary | ICD-10-CM

## 2018-05-11 NOTE — PROGRESS NOTES
Ginny Villareal  Identified pt with two pt identifiers(name and ). Chief Complaint   Patient presents with    Medication Refill     Rm: 6 Saxenda refills and weight check        1. Have you been to the ER, urgent care clinic since your last visit? Hospitalized since your last visit? no    2. Have you seen or consulted any other health care providers outside of the [address] [place] [address] since your last visit? Include any pap smears or colon screening. no    Today's provider has been notified of reason for visit, vitals and flowsheets obtained on patients.      Patient received paperwork for advance directive during previous visit but has not completed at this time     Reviewed record In preparation for visit, huddled with provider and have obtained necessary documentation      Health Maintenance Due   Topic    COLONOSCOPY        Wt Readings from Last 3 Encounters:   18 151 lb (68.5 kg)   18 153 lb (69.4 kg)   18 159 lb 1.6 oz (72.2 kg)     Temp Readings from Last 3 Encounters:   18 97.8 °F (36.6 °C) (Oral)   18 97.7 °F (36.5 °C) (Oral)   18 97.3 °F (36.3 °C) (Oral)     BP Readings from Last 3 Encounters:   18 121/70   18 119/65   18 128/61     Pulse Readings from Last 3 Encounters:   18 86   18 70   18 69     Vitals:    18 1232   BP: 121/70   Pulse: 86   Resp: 18   Temp: 97.8 °F (36.6 °C)   TempSrc: Oral   SpO2: 98%   Weight: 151 lb (68.5 kg)   Height: 5' 4\" (1.626 m)   PainSc:   0 - No pain         Learning Assessment:  :     Learning Assessment 2018   PRIMARY LEARNER Patient   HIGHEST LEVEL OF EDUCATION - PRIMARY LEARNER  2 YEARS OF COLLEGE   BARRIERS PRIMARY LEARNER VISUAL   CO-LEARNER CAREGIVER No   PRIMARY LANGUAGE ENGLISH   LEARNER PREFERENCE PRIMARY READING   ANSWERED BY Patient   RELATIONSHIP SELF       Depression Screening:  :     PHQ over the last two weeks 2018   Little interest or pleasure in doing things Not at all   Feeling down, depressed or hopeless Not at all   Total Score PHQ 2 0   Trouble falling or staying asleep, or sleeping too much -   Feeling tired or having little energy -   Poor appetite or overeating -   Feeling bad about yourself - or that you are a failure or have let yourself or your family down -   Trouble concentrating on things such as school, work, reading or watching TV -   Moving or speaking so slowly that other people could have noticed; or the opposite being so fidgety that others notice -   Thoughts of being better off dead, or hurting yourself in some way -   PHQ 9 Score -       Fall Risk Assessment:  :     Fall Risk Assessment, last 12 mths 5/11/2018   Able to walk? Yes   Fall in past 12 months? No       Abuse Screening:  :     Abuse Screening Questionnaire 5/11/2018 12/12/2017 7/31/2014   Do you ever feel afraid of your partner? N N N   Are you in a relationship with someone who physically or mentally threatens you? N N N   Is it safe for you to go home? Y Y Y       ADL Screening:  :     ADL Assessment 4/5/2018   Feeding yourself No Help Needed   Getting from bed to chair No Help Needed   Getting dressed No Help Needed   Bathing or showering No Help Needed   Walk across the room (includes cane/walker) No Help Needed   Using the telphone No Help Needed   Taking your medications No Help Needed   Preparing meals No Help Needed   Managing money (expenses/bills) No Help Needed   Moderately strenuous housework (laundry) No Help Needed   Shopping for personal items (toiletries/medicines) No Help Needed   Shopping for groceries No Help Needed   Driving No Help Needed   Climbing a flight of stairs No Help Needed   Getting to places beyond walking distances No Help Needed                 Medication reconciliation up to date and corrected with patient at this time.

## 2018-05-11 NOTE — PROGRESS NOTES
Chief Complaint   Patient presents with    Medication Refill     Rm: 6 Saxenda refills and weight check          HPI:  The patient is a 74 y.o. female who presents today for a follow up appointment. No hospital, ER or specialist visits since last primary care visit except as noted below. Weight Mgmt:  The patient is a former patient of mine last seen at [address] [place] [address]. GBP Surgery August 20, 2012 with Dr. Elba Cortes. Preop weight 253 pounds. Her daughter in law was supposed to have a gastric balloon placed in October at the [facility]. She never had this done because they found something during the procedure they needed to address and they postponed it. She has been 147-150 pounds for years. At her visit on 10/16/2017 it had increased to 150-152 pounds. Her goal weight is 140-150 pounds. She is 5 years postop. She had been on Phentermine daily with good effectiveness. She also tried Contrave but felt she was not feeling well, nausea, \"felt so bad\" and just wanted to sit around. She was unable to tolerate the medication and ultimately she stopped taking it after about two weeks. Today her weight is 151 pounds with a BMI of 25.92. She has lost 2 pounds since her last visit. Her last labs were in July 2017. Taking nutritional supplements as recommended. Exercise: Not very much. She started the Saxenda sample and the first week was \"great\", her hunger was well controlled. The second week she developed significant N/V and stopped the medication after the second week. She ran out of her Phentermine approximately 1 weeks ago. She has not started the Wellbutrin because Saxenda worked so well. She denies N/V unrelated to medication, GERD, night time regurgitation, abdominal pain. She c/o constipation relieved with Linzess and Miralax with relief. She is taking in about 48-50+ fluids daily. Review of Systems  A comprehensive review of systems was negative except for that written in the HPI.     Patient Active Problem List   Diagnosis Code    Asthma J45.909    Sleep apnea G47.30    HTN     Macular degeneration H35.30    Elevated LFT's     Left sided sciatica M54.32    Allergic rhinitis J30.9    Hypothyroidism E03.9    Depression F32.9    Obesity E66.9    Vitamin D deficiency E55.9    Bariatric surgery status Z98.84       Past Medical History:   Diagnosis Date    Allergic rhinitis 1/28/2009    Asthma 1/28/2009    Dental disorder NEC     DM (diabetes mellitus) ([address] 1/28/2009    Elevated LFT's 1/28/2009    HTN 1/28/2009    CONTROLLED BY MEDS.     Hypothyroidism 1/28/2009    Left sided sciatica 1/28/2009    Macular degeneration 1/28/2009    Morbid obesity ([address]     Musculoskeletal disorder     Sleep apnea 1/28/2009    USES C-PAP    Thyroid disease     HYPOTHYROIDISM       Past Surgical History:   Procedure Laterality Date    BREAST SURGERY PROCEDURE UNLISTED      THINKS ITS RIGHT BREAST.    HX BREAST BIOPSY Bilateral 1997    neg./ ? laterality    HX GI      COLONOSCOPY X 1    HX GYN      TOTAL VAGINAL HYSTERECTOMY    HX GYN      EXPLOROTORY FOR ECTOPIC PREGNANCY (uterine horn) BTL., DNC    HX ORTHOPAEDIC  1980    carpal tunnel release       Social History   Substance Use Topics    Smoking status: Former Smoker     Packs/day: 1.00     Years: 6.00     Quit date: 1/28/1969    Smokeless tobacco: Never Used      Comment: 1966    Alcohol use 1.0 oz/week     1 Glasses of wine, 1 Shots of liquor per week      Comment: rare       Family History   Problem Relation Age of Onset    Macular Degen Mother     Hypertension Mother     Cancer Father      lung    Hypertension Father     Breast Cancer Sister 72    Hypertension Brother        Outpatient Prescriptions Marked as Taking for the 5/11/18 encounter (Office Visit) with Paddy Ambriz NP   Medication Sig Dispense Refill    liraglutide (SAXENDA) 3 mg/0.5 mL (18 mg/3 mL) pen Inject 0.6 x 1 week, then 1.2 x 1 week, then 1.8 x 1 week, then 2.4 x 1 week, then 3 x 1week 1 Adjustable Dose Pre-filled Pen Syringe 0    liraglutide (SAXENDA) 3 mg/0.5 mL (18 mg/3 mL) pen Inject 0.6 x 1 week, then 1.2 x 1 week, then 1.8 x 1 week, then 2.4 x 1 week, then 3 x 1week 1 Adjustable Dose Pre-filled Pen Syringe 0    liraglutide (SAXENDA) 3 mg/0.5 mL (18 mg/3 mL) pen Inject 0.6 x 1 week, then 1.2 x 1 week, then 1.8 x 1 week, then 2.4 x 1 week, then 3 x 1week 1 Adjustable Dose Pre-filled Pen Syringe 0    montelukast (SINGULAIR) 10 mg tablet Take 1 Tab by mouth daily. Indications: Allergic Rhinitis, MAINTENANCE THERAPY FOR ASTHMA 30 Tab 1    Levothyroxine 137 mcg cap Take  by mouth daily.  lisinopril-hydroCHLOROthiazide (PRINZIDE, ZESTORETIC) 10-12.5 mg per tablet Take  by mouth daily.  venlafaxine (EFFEXOR) 37.5 mg tablet Take 37.5 mg by mouth daily.  cyanocobalamin (VITAMIN B-12) 500 mcg tablet Take 500 mcg by mouth daily.  linaclotide (LINZESS) 290 mcg cap capsule Take  by mouth Daily (before breakfast).  multivitamin (ONE A DAY) tablet Take 1 Tab by mouth daily.  Vit A,C,E-Zinc-Copper (PRESERVISION AREDS) Cap capsule Take 1 Cap by mouth two (2) times a day. BREAKFAST & BED TIME.           Allergies   Allergen Reactions    Cat Dander Shortness of Breath    Dog Dander Shortness of Breath    Mold Shortness of Breath    Other Plant, Animal, Environmental Shortness of Breath     Dust       PE:  Visit Vitals    /70 (BP 1 Location: Left arm, BP Patient Position: Sitting)    Pulse 86    Temp 97.8 °F (36.6 °C) (Oral)    Resp 18    Ht 5' 4\" (1.626 m)    Wt 151 lb (68.5 kg)    SpO2 98%    BMI 25.92 kg/m2     Gen: alert, oriented, no acute distress  Head: normocephalic, atraumatic  Ears: external auditory canals clear, TMs without erythema or effusion  Eyes: pupils equal round reactive to light, sclera clear, conjunctiva clear  Oral: moist mucus membranes, no oral lesions, no pharyngeal inflammation or exudate  Neck: symmetric normal sized thyroid, no carotid bruits, no jugular vein distention  Resp: no increase work of breathing, lungs clear to ausculation bilaterally, no wheezing, rales or rhonchi  CV: S1, S2 normal.  No murmurs, rubs, or gallops. Abd: soft, not tender, not distended. No hepatosplenomegaly. Normal bowel sounds. No hernias. No abdominal or renal bruits. Neuro: cranial nerves intact, normal strength and movement in all extremities, reflexes and sensation intact and symmetric. Skin: no lesion or rash  Extremities: no cyanosis or edema    Assessment/Plan:    ICD-10-CM ICD-9-CM    1. Obesity, unspecified classification, unspecified obesity type, unspecified whether serious comorbidity present E66.9 278.00 liraglutide (SAXENDA) 3 mg/0.5 mL (18 mg/3 mL) pen      liraglutide (SAXENDA) 3 mg/0.5 mL (18 mg/3 mL) pen      liraglutide (SAXENDA) 3 mg/0.5 mL (18 mg/3 mL) pen   2. Bariatric surgery status Z98.84 V45.86 liraglutide (SAXENDA) 3 mg/0.5 mL (18 mg/3 mL) pen      liraglutide (SAXENDA) 3 mg/0.5 mL (18 mg/3 mL) pen      liraglutide (SAXENDA) 3 mg/0.5 mL (18 mg/3 mL) pen     Follow-up Disposition:  Return in about 4 weeks (around 6/8/2018) for Medication Check, Weight Mgmt. reviewed diet, exercise and weight control  reviewed medications and side effects in detail    Health Maintenance reviewed - deferred to pt's PCP    Recommended healthy diet low in carbohydrates, fats, sodium and cholesterol. Recommended regular cardiovascular exercise 3-6 times per week for 30-60 minutes daily. Chart is reviewed and updated today in the office. Records requested for other providers patient has seen and is currently seeing. Patient was offered a choice/choices in the treatment plan today. Patient expresses understanding of the plan and agrees with recommendations. Verbal and written instructions (see AVS) provided. See patient instructions for more. Patient expresses understanding of diagnosis and treatment plan.

## 2018-05-11 NOTE — PATIENT INSTRUCTIONS
Learning About Low-Carbohydrate Diets for Weight Loss  What is a low-carbohydrate diet? Low-carb diets avoid foods that are high in carbohydrate. These high-carb foods include pasta, bread, rice, cereal, fruits, and starchy vegetables. Instead, these diets usually have you eat foods that are high in fat and protein. Many people lose weight quickly on a low-carb diet. But the early weight loss is water. People on this diet often gain the weight back after they start eating carbs again. Not all diet plans are safe or work well. A lot of the evidence shows that low-carb diets aren't healthy. That's because these diets often don't include healthy foods like fruits and vegetables. Losing weight safely means balancing protein, fat, and carbs with every meal and snack. And low-carb diets don't always provide the vitamins, minerals, and fiber you need. If you have a serious medical condition, talk to your doctor before you try any diet. These conditions include kidney disease, heart disease, type 2 diabetes, high cholesterol, and high blood pressure. If you are pregnant, it may not be safe for your baby if you are on a low-carb diet. How can you lose weight safely? You might have heard that a diet plan helped another person lose weight. But that doesn't mean that it will work for you. It is very hard to stay on a diet that includes lots of big changes in your eating habits. If you want to get to a healthy weight and stay there, making healthy lifestyle changes will often work better than dieting. These steps can help. · Make a plan for change. Work with your doctor to create a plan that is right for you. · See a dietitian. He or she can show you how to make healthy changes in your eating habits. · Manage stress. If you have a lot of stress in your life, it can be hard to focus on making healthy changes to your daily habits. · Track your food and activity.  You are likely to do better at losing weight if you keep track of what you eat and what you do. Follow-up care is a key part of your treatment and safety. Be sure to make and go to all appointments, and call your doctor if you are having problems. It's also a good idea to know your test results and keep a list of the medicines you take. Where can you learn more? Go to http://dileep-tuyet.info/. Enter A121 in the search box to learn more about \"Learning About Low-Carbohydrate Diets for Weight Loss. \"  Current as of: May 12, 2017  Content Version: 11.4  © 6630-1459 [employer], [employer]. Care instructions adapted under license by [employer] (which disclaims liability or warranty for this information). If you have questions about a medical condition or this instruction, always ask your healthcare professional. [address] any warranty or liability for your use of this information.

## 2018-05-11 NOTE — MR AVS SNAPSHOT
[address] 
[address] [facility] [address] 
[phone] Patient: Roly Cornell MRN: [id number] GHX:[id number] Visit Information Date & Time Provider Department Dept. Phone Encounter #  
 5/11/2018 12:40 PM Mora Li NP [facility] [phone] [id number] Follow-up Instructions Return in about 4 weeks (around 6/8/2018) for Medication Check, Weight Mgmt. Follow-up and Disposition History Upcoming Health Maintenance Date Due COLONOSCOPY 8/13/2018 HEMOGLOBIN A1C Q6M 6/12/2018 LIPID PANEL Q1 7/19/2018 Influenza Age 5 to Adult 8/1/2018 FOOT EXAM Q1 10/19/2018 MICROALBUMIN Q1 10/19/2018 MEDICARE YEARLY EXAM 12/13/2018 GLAUCOMA SCREENING Q2Y 8/9/2019 DTaP/Tdap/Td series (2 - Td) 11/14/2022 Allergies as of 5/11/2018  Review Complete On: 5/11/2018 By: Mora Li NP Severity Noted Reaction Type Reactions Cat Dander High 10/16/2017    Shortness of Breath Dog Dander High 10/16/2017    Shortness of Breath Mold High 10/16/2017    Shortness of Breath Other Plant, Animal, Environmental High 10/16/2017    Shortness of Breath Dust  
  
Current Immunizations  Reviewed on 5/11/2018 No immunizations on file. Reviewed by Lorne Jhaveri LPN on 3/10/2718 at 39:40 PM  
You Were Diagnosed With   
  
 Codes Comments Obesity, unspecified classification, unspecified obesity type, unspecified whether serious comorbidity present    -  Primary ICD-10-CM: E66.9 ICD-9-CM: 278.00 Bariatric surgery status     ICD-10-CM: Z98.84 ICD-9-CM: V45.86 Vitals BP Pulse Temp Resp Height(growth percentile) Weight(growth percentile) 121/70 (BP 1 Location: Left arm, BP Patient Position: Sitting) 86 97.8 °F (36.6 °C) (Oral) 18 5' 4\" (1.626 m) 151 lb (68.5 kg) SpO2 BMI OB Status Smoking Status 98% 25.92 kg/m2 Hysterectomy Former Smoker BMI and BSA Data Body Mass Index Body Surface Area  
 25.92 kg/m 2 1.76 m 2 Preferred Pharmacy Pharmacy Name Phone [id number], [address] [facility] [phone] Your Updated Medication List  
  
   
This list is accurate as of 5/11/18  2:05 PM.  Always use your most recent med list.  
  
  
  
  
 Levothyroxine 137 mcg Cap Take  by mouth daily. LINZESS 290 mcg Cap capsule Generic drug:  linaclotide Take  by mouth Daily (before breakfast). * liraglutide 3 mg/0.5 mL (18 mg/3 mL) pen Commonly known as:  Bonilla Machipongo Inject 0.6 x 1 week, then 1.2 x 1 week, then 1.8 x 1 week, then 2.4 x 1 week, then 3 x 1week * liraglutide 3 mg/0.5 mL (18 mg/3 mL) pen Commonly known as:  Bonilla Machipongo Inject 0.6 x 1 week, then 1.2 x 1 week, then 1.8 x 1 week, then 2.4 x 1 week, then 3 x 1week * liraglutide 3 mg/0.5 mL (18 mg/3 mL) pen Commonly known as:  Bonilla Machipongo Inject 0.6 x 1 week, then 1.2 x 1 week, then 1.8 x 1 week, then 2.4 x 1 week, then 3 x 1week  
  
 lisinopril-hydroCHLOROthiazide 10-12.5 mg per tablet Commonly known as:  Beeler Bramwell Take  by mouth daily. montelukast 10 mg tablet Commonly known as:  SINGULAIR Take 1 Tab by mouth daily. Indications: Allergic Rhinitis, MAINTENANCE THERAPY FOR ASTHMA  
  
 multivitamin tablet Commonly known as:  ONE A DAY Take 1 Tab by mouth daily. PRESERVISION AREDS Cap capsule Generic drug:  Vit A,C,E-Zinc-Copper Take 1 Cap by mouth two (2) times a day. BREAKFAST & BED TIME. venlafaxine 37.5 mg tablet Commonly known as:  [facility] Take 37.5 mg by mouth daily. VITAMIN B-12 500 mcg tablet Generic drug:  cyanocobalamin Take 500 mcg by mouth daily. * Notice: This list has 3 medication(s) that are the same as other medications prescribed for you. Read the directions carefully, and ask your doctor or other care provider to review them with you. Follow-up Instructions Return in about 4 weeks (around 6/8/2018) for Medication Check, Weight Mgmt. Patient Instructions Learning About Low-Carbohydrate Diets for Weight Loss What is a low-carbohydrate diet? Low-carb diets avoid foods that are high in carbohydrate. These high-carb foods include pasta, bread, rice, cereal, fruits, and starchy vegetables. Instead, these diets usually have you eat foods that are high in fat and protein. Many people lose weight quickly on a low-carb diet. But the early weight loss is water. People on this diet often gain the weight back after they start eating carbs again. Not all diet plans are safe or work well. A lot of the evidence shows that low-carb diets aren't healthy. That's because these diets often don't include healthy foods like fruits and vegetables. Losing weight safely means balancing protein, fat, and carbs with every meal and snack. And low-carb diets don't always provide the vitamins, minerals, and fiber you need. If you have a serious medical condition, talk to your doctor before you try any diet. These conditions include kidney disease, heart disease, type 2 diabetes, high cholesterol, and high blood pressure. If you are pregnant, it may not be safe for your baby if you are on a low-carb diet. How can you lose weight safely? You might have heard that a diet plan helped another person lose weight. But that doesn't mean that it will work for you. It is very hard to stay on a diet that includes lots of big changes in your eating habits. If you want to get to a healthy weight and stay there, making healthy lifestyle changes will often work better than dieting. These steps can help. · Make a plan for change. Work with your doctor to create a plan that is right for you. · See a dietitian. He or she can show you how to make healthy changes in your eating habits. · Manage stress.  If you have a lot of stress in your life, it can be hard to focus on making healthy changes to your daily habits. · Track your food and activity. You are likely to do better at losing weight if you keep track of what you eat and what you do. Follow-up care is a key part of your treatment and safety. Be sure to make and go to all appointments, and call your doctor if you are having problems. It's also a good idea to know your test results and keep a list of the medicines you take. Where can you learn more? Go to http://dileep-tuyet.info/. Enter A121 in the search box to learn more about \"Learning About Low-Carbohydrate Diets for Weight Loss. \" Current as of: May 12, 2017 Content Version: 11.4 © 1456-3704 [facility]. Care instructions adapted under license by [employer] (which disclaims liability or warranty for this information). If you have questions about a medical condition or this instruction, always ask your healthcare professional. [place] [address] any warranty or liability for your use of this information. Patient Instructions History Introducing hospitals & HEALTH SERVICES! Dear Ang Camara: Thank you for requesting a [employer] account. Our records indicate that you already have an active [employer] account. You can access your account anytime at [email or web address] Did you know that you can access your hospital and ER discharge instructions at any time in [employer]? You can also review all of your test results from your hospital stay or ER visit. Additional Information If you have questions, please visit the Frequently Asked Questions section of the [employer] website at [email or web address] Remember, [employer] is NOT to be used for urgent needs. For medical emergencies, dial 911. Now available from your iPhone and Android! Please provide this summary of care documentation to your next provider. Your primary care clinician is listed as Simone Chowdhury. If you have any questions after today's visit, please call [phone].

## 2018-05-25 DIAGNOSIS — J45.20 MILD INTERMITTENT ASTHMA WITHOUT COMPLICATION: Chronic | ICD-10-CM

## 2018-05-25 RX ORDER — MONTELUKAST SODIUM 10 MG/1
TABLET ORAL
Qty: 30 TAB | Refills: 0 | Status: SHIPPED | OUTPATIENT
Start: 2018-05-25 | End: 2018-09-06 | Stop reason: SDUPTHER

## 2018-06-14 ENCOUNTER — OFFICE VISIT (OUTPATIENT)
Dept: FAMILY MEDICINE CLINIC | Age: 73
End: 2018-06-14

## 2018-06-14 VITALS
HEART RATE: 68 BPM | SYSTOLIC BLOOD PRESSURE: 116 MMHG | RESPIRATION RATE: 18 BRPM | TEMPERATURE: 98 F | BODY MASS INDEX: 25.52 KG/M2 | WEIGHT: 149.5 LBS | DIASTOLIC BLOOD PRESSURE: 61 MMHG | HEIGHT: 64 IN | OXYGEN SATURATION: 97 %

## 2018-06-14 DIAGNOSIS — Z13.220 LIPID SCREENING: ICD-10-CM

## 2018-06-14 DIAGNOSIS — I10 ESSENTIAL HYPERTENSION: ICD-10-CM

## 2018-06-14 DIAGNOSIS — E53.8 VITAMIN B12 DEFICIENCY: ICD-10-CM

## 2018-06-14 DIAGNOSIS — E44.0 MODERATE PROTEIN-CALORIE MALNUTRITION (HCC): ICD-10-CM

## 2018-06-14 DIAGNOSIS — E55.9 VITAMIN D DEFICIENCY: ICD-10-CM

## 2018-06-14 DIAGNOSIS — Z98.84 BARIATRIC SURGERY STATUS: ICD-10-CM

## 2018-06-14 DIAGNOSIS — Z86.39 HX OF DIABETES MELLITUS: ICD-10-CM

## 2018-06-14 DIAGNOSIS — E03.9 HYPOTHYROIDISM, UNSPECIFIED TYPE: Chronic | ICD-10-CM

## 2018-06-14 DIAGNOSIS — E66.9 OBESITY, UNSPECIFIED CLASSIFICATION, UNSPECIFIED OBESITY TYPE, UNSPECIFIED WHETHER SERIOUS COMORBIDITY PRESENT: Primary | ICD-10-CM

## 2018-06-14 DIAGNOSIS — R53.83 FATIGUE, UNSPECIFIED TYPE: ICD-10-CM

## 2018-06-14 NOTE — MR AVS SNAPSHOT
Jovan Patel 
 
 
 [address] [facility] 
[address] [facility] [address] 
[phone] Patient: Roly Cornell MRN: [id number] VCI:8/27/3350 Visit Information Date & Time Provider Department Dept. Phone Encounter #  
 6/14/2018  8:20 AM Mora Li NP [facility] [phone] [id number] Follow-up Instructions Return in about 3 months (around 9/14/2018) for Medication Check, Weight Mgmt. Upcoming Health Maintenance Date Due COLONOSCOPY 8/13/2018 Influenza Age 5 to Adult 8/1/2018 MEDICARE YEARLY EXAM 12/13/2018 GLAUCOMA SCREENING Q2Y 8/9/2019 DTaP/Tdap/Td series (2 - Td) 11/14/2022 Allergies as of 6/14/2018  Review Complete On: 6/14/2018 By: Mora Li NP Severity Noted Reaction Type Reactions Cat Dander High 10/16/2017    Shortness of Breath Dog Dander High 10/16/2017    Shortness of Breath Mold High 10/16/2017    Shortness of Breath Other Plant, Animal, Environmental High 10/16/2017    Shortness of Breath Dust  
  
Current Immunizations  Reviewed on 6/14/2018 No immunizations on file. Reviewed by Lorne Jhaveri LPN on 2/67/4371 at  8:51 AM  
You Were Diagnosed With   
  
 Codes Comments Obesity, unspecified classification, unspecified obesity type, unspecified whether serious comorbidity present    -  Primary ICD-10-CM: E66.9 ICD-9-CM: 278.00 Bariatric surgery status     ICD-10-CM: Z98.84 ICD-9-CM: V45.86 Vitamin D deficiency     ICD-10-CM: E55.9 ICD-9-CM: 268.9 Hypothyroidism, unspecified type     ICD-10-CM: E03.9 ICD-9-CM: 244.9 Essential hypertension     ICD-10-CM: I10 
ICD-9-CM: 401.9 Hx of diabetes mellitus     ICD-10-CM: Z86.39 
ICD-9-CM: V12.29 Lipid screening     ICD-10-CM: B37.561 ICD-9-CM: V77.91 Vitamin B12 deficiency     ICD-10-CM: E53.8 ICD-9-CM: 266.2  Fatigue, unspecified type     ICD-10-CM: R53.83 
 ICD-9-CM: 780.79 Moderate protein-calorie malnutrition ([facility] [address]     ICD-10-CM: E44.0 ICD-9-CM: 263.0 Vitals BP Pulse Temp Resp Height(growth percentile) Weight(growth percentile) 116/61 (BP 1 Location: Left arm, BP Patient Position: Sitting) 68 98 °F (36.7 °C) (Temporal) 18 5' 4\" (1.626 m) 149 lb 8 oz (67.8 kg) SpO2 BMI OB Status Smoking Status 97% 25.66 kg/m2 Hysterectomy Former Smoker BMI and BSA Data Body Mass Index Body Surface Area  
 25.66 kg/m 2 1.75 m 2 Preferred Pharmacy Pharmacy Name Phone [id number] [phone] Your Updated Medication List  
  
   
This list is accurate as of 6/14/18 10:09 AM.  Always use your most recent med list.  
  
  
  
  
 Levothyroxine 137 mcg Cap Take  by mouth daily. LINZESS 290 mcg Cap capsule Generic drug:  linaclotide Take  by mouth Daily (before breakfast). liraglutide 3 mg/0.5 mL (18 mg/3 mL) pen Commonly known as:  Marlinda Backbone Inject 0.6 x 1 week, then 1.2 x 1 week, then 1.8 x 1 week, then 2.4 x 1 week, then 3 x 1week  
  
 lisinopril-hydroCHLOROthiazide 10-12.5 mg per tablet Commonly known as:  Leonetta Seeds Take  by mouth daily. montelukast 10 mg tablet Commonly known as:  SINGULAIR  
TAKE 1 TABLET BY MOUTH DAILY FOR ASTHMA  
  
 multivitamin tablet Commonly known as:  ONE A DAY Take 1 Tab by mouth daily. PRESERVISION AREDS Cap capsule Generic drug:  Vit A,C,E-Zinc-Copper Take 1 Cap by mouth two (2) times a day. BREAKFAST & BED TIME. venlafaxine 37.5 mg tablet Commonly known as:  [facility] Take 37.5 mg by mouth daily. VITAMIN B-12 500 mcg tablet Generic drug:  cyanocobalamin Take 500 mcg by mouth daily. We Performed the Following CBC WITH AUTOMATED DIFF [63124 CPT(R)] COPPER [id number] CPT(R)] FERRITIN [69044 CPT(R)] FOLATE [id number] CPT(R)] HEMOGLOBIN A1C WITH EAG [16039 CPT(R)] IRON PROFILE [id number] CPT(R)] LIPID PANEL [26283 CPT(R)] METABOLIC PANEL, COMPREHENSIVE [58834 CPT(R)] PREALBUMIN [25051 CPT(R)] PTH INTACT [10204 CPT(R)] TSH REFLEX TO T4 [93588 CPT(R)] URINALYSIS W/ RFLX MICROSCOPIC [52007 CPT(R)] VITAMIN B12 [id number] CPT(R)] VITAMIN D, 25 HYDROXY [id number] CPT(R)] ZINC K244818 CPT(R)] Follow-up Instructions Return in about 3 months (around 9/14/2018) for Medication Check, Weight Mgmt. Patient Instructions High Blood Pressure: Care Instructions Your Care Instructions If your blood pressure is usually above 140/90, you have high blood pressure, or hypertension. That means the top number is 140 or higher or the bottom number is 90 or higher, or both. Despite what a lot of people think, high blood pressure usually doesn't cause headaches or make you feel dizzy or lightheaded. It usually has no symptoms. But it does increase your risk for heart attack, stroke, and kidney or eye damage. The higher your blood pressure, the more your risk increases. Your doctor will give you a goal for your blood pressure. Your goal will be based on your health and your age. An example of a goal is to keep your blood pressure below 140/90. Lifestyle changes, such as eating healthy and being active, are always important to help lower blood pressure. You might also take medicine to reach your blood pressure goal. 
Follow-up care is a key part of your treatment and safety. Be sure to make and go to all appointments, and call your doctor if you are having problems. It's also a good idea to know your test results and keep a list of the medicines you take. How can you care for yourself at home? Medical treatment · If you stop taking your medicine, your blood pressure will go back up. You may take one or more types of medicine to lower your blood pressure. Be safe with medicines. Take your medicine exactly as prescribed. Call your doctor if you think you are having a problem with your medicine. · Talk to your doctor before you start taking aspirin every day. Aspirin can help certain people lower their risk of a heart attack or stroke. But taking aspirin isn't right for everyone, because it can cause serious bleeding. · See your doctor regularly. You may need to see the doctor more often at first or until your blood pressure comes down. · If you are taking blood pressure medicine, talk to your doctor before you take decongestants or anti-inflammatory medicine, such as ibuprofen. Some of these medicines can raise blood pressure. · Learn how to check your blood pressure at home. Lifestyle changes · Stay at a healthy weight. This is especially important if you put on weight around the waist. Losing even 10 pounds can help you lower your blood pressure. · If your doctor recommends it, get more exercise. Walking is a good choice. Bit by bit, increase the amount you walk every day. Try for at least 30 minutes on most days of the week. You also may want to swim, bike, or do other activities. · Avoid or limit alcohol. Talk to your doctor about whether you can drink any alcohol. · Try to limit how much sodium you eat to less than 2,300 milligrams (mg) a day. Your doctor may ask you to try to eat less than 1,500 mg a day. · Eat plenty of fruits (such as bananas and oranges), vegetables, legumes, whole grains, and low-fat dairy products. · Lower the amount of saturated fat in your diet. Saturated fat is found in animal products such as milk, cheese, and meat. Limiting these foods may help you lose weight and also lower your risk for heart disease. · Do not smoke. Smoking increases your risk for heart attack and stroke. If you need help quitting, talk to your doctor about stop-smoking programs and medicines. These can increase your chances of quitting for good. When should you call for help? Call 911 anytime you think you may need emergency care. This may mean having symptoms that suggest that your blood pressure is causing a serious heart or blood vessel problem. Your blood pressure may be over 180/110. ? For example, call 911 if: 
? · You have symptoms of a heart attack. These may include: ¨ Chest pain or pressure, or a strange feeling in the chest. 
¨ Sweating. ¨ Shortness of breath. ¨ Nausea or vomiting. ¨ Pain, pressure, or a strange feeling in the back, neck, jaw, or upper belly or in one or both shoulders or arms. ¨ Lightheadedness or sudden weakness. ¨ A fast or irregular heartbeat. ? · You have symptoms of a stroke. These may include: 
¨ Sudden numbness, tingling, weakness, or loss of movement in your face, arm, or leg, especially on only one side of your body. ¨ Sudden vision changes. ¨ Sudden trouble speaking. ¨ Sudden confusion or trouble understanding simple statements. ¨ Sudden problems with walking or balance. ¨ A sudden, severe headache that is different from past headaches. ? · You have severe back or belly pain. ?Do not wait until your blood pressure comes down on its own. Get help right away. ?Call your doctor now or seek immediate care if: 
? · Your blood pressure is much higher than normal (such as 180/110 or higher), but you don't have symptoms. ? · You think high blood pressure is causing symptoms, such as: ¨ Severe headache. ¨ Blurry vision. ? Watch closely for changes in your health, and be sure to contact your doctor if: 
? · Your blood pressure measures 140/90 or higher at least 2 times. That means the top number is 140 or higher or the bottom number is 90 or higher, or both. ? · You think you may be having side effects from your blood pressure medicine. ? · Your blood pressure is usually normal, but it goes above normal at least 2 times. Where can you learn more? Go to http://dileep-tuyet.info/. Enter P214 in the search box to learn more about \"High Blood Pressure: Care Instructions. \" Current as of: September 21, 2016 Content Version: 11.4 © 3907-7970 [employer]. Care instructions adapted under license by [employer] (which disclaims liability or warranty for this information). If you have questions about a medical condition or this instruction, always ask your healthcare professional. [address] any warranty or liability for your use of this information. Hypothyroidism: Care Instructions Your Care Instructions You have hypothyroidism, which means that your body is not making enough thyroid hormone. This hormone helps your body use energy. If your thyroid level is low, you may feel tired, be constipated, have an increase in your blood pressure, or have dry skin or memory problems. You may also get cold easily, even when it is warm. Women with low thyroid levels may have heavy menstrual periods. A blood test to find your thyroid-stimulating hormone (TSH) level is used to check for hypothyroidism. A high TSH level may mean that you have low thyroid. When your body is not making enough thyroid hormone, TSH levels rise in an effort to make the body produce more. The treatment for hypothyroidism is to take thyroid hormone pills. You should start to feel better in 1 to 2 weeks. But it can take several months to see changes in the TSH level. You will need regular visits with your doctor to make sure you have the right dose of medicine. Most people need treatment for the rest of their lives. You will need to see your doctor regularly to have blood tests and to make sure you are doing well. Follow-up care is a key part of your treatment and safety.  Be sure to make and go to all appointments, and call your doctor if you are having problems. It's also a good idea to know your test results and keep a list of the medicines you take. How can you care for yourself at home? · Take your thyroid hormone medicine exactly as prescribed. Call your doctor if you think you are having a problem with your medicine. Most people do not have side effects if they take the right amount of medicine regularly. ¨ Take the medicine 30 minutes before breakfast, and do not take it with calcium, vitamins, or iron. ¨ Do not take extra doses of your thyroid medicine. It will not help you get better any faster, and it may cause side effects. ¨ If you forget to take a dose, do NOT take a double dose of medicine. Take your usual dose the next day. · Tell your doctor about all prescription, herbal, or over-the-counter products you take. · Take care of yourself. Eat a healthy diet, get enough sleep, and get regular exercise. When should you call for help? Call 911 anytime you think you may need emergency care. For example, call if: 
? · You passed out (lost consciousness). ? · You have severe trouble breathing. ? · You have a very slow heartbeat (less than 60 beats a minute). ? · You have a low body temperature (95°F or below). ?Call your doctor now or seek immediate medical care if: 
? · You feel tired, sluggish, or weak. ? · You have trouble remembering things or concentrating. ? · You do not begin to feel better 2 weeks after starting your medicine. ? Watch closely for changes in your health, and be sure to contact your doctor if you have any problems. Where can you learn more? Go to http://dileep-tuyet.info/. Enter B176 in the search box to learn more about \"Hypothyroidism: Care Instructions. \" Current as of: May 12, 2017 Content Version: 11.4 © 2241-8473 Healthwise, [employer].  Care instructions adapted under license by [employer] (which disclaims liability or warranty for this information). If you have questions about a medical condition or this instruction, always ask your healthcare professional. [address] any warranty or liability for your use of this information. Introducing [facility]! Dear Lulu Nuñez: Thank you for requesting a Smarter Pockets account. Our records indicate that you already have an active Smarter Pockets account. You can access your account anytime at [email or web address]. [email or web address] Braxis Did you know that you can access your hospital and ER discharge instructions at any time in Smarter Pockets? You can also review all of your test results from your hospital stay or ER visit. Additional Information If you have questions, please visit the Frequently Asked Questions section of the Smarter Pockets website at [email or web address] Remember, Smarter Pockets is NOT to be used for urgent needs. For medical emergencies, dial 911. Now available from your iPhone and Android! Please provide this summary of care documentation to your next provider. Your primary care clinician is listed as Leotyrese Perez. If you have any questions after today's visit, please call [phone].

## 2018-06-14 NOTE — PATIENT INSTRUCTIONS
High Blood Pressure: Care Instructions  Your Care Instructions    If your blood pressure is usually above 140/90, you have high blood pressure, or hypertension. That means the top number is 140 or higher or the bottom number is 90 or higher, or both. Despite what a lot of people think, high blood pressure usually doesn't cause headaches or make you feel dizzy or lightheaded. It usually has no symptoms. But it does increase your risk for heart attack, stroke, and kidney or eye damage. The higher your blood pressure, the more your risk increases. Your doctor will give you a goal for your blood pressure. Your goal will be based on your health and your age. An example of a goal is to keep your blood pressure below 140/90. Lifestyle changes, such as eating healthy and being active, are always important to help lower blood pressure. You might also take medicine to reach your blood pressure goal.  Follow-up care is a key part of your treatment and safety. Be sure to make and go to all appointments, and call your doctor if you are having problems. It's also a good idea to know your test results and keep a list of the medicines you take. How can you care for yourself at home? Medical treatment  · If you stop taking your medicine, your blood pressure will go back up. You may take one or more types of medicine to lower your blood pressure. Be safe with medicines. Take your medicine exactly as prescribed. Call your doctor if you think you are having a problem with your medicine. · Talk to your doctor before you start taking aspirin every day. Aspirin can help certain people lower their risk of a heart attack or stroke. But taking aspirin isn't right for everyone, because it can cause serious bleeding. · See your doctor regularly. You may need to see the doctor more often at first or until your blood pressure comes down.   · If you are taking blood pressure medicine, talk to your doctor before you take decongestants or anti-inflammatory medicine, such as ibuprofen. Some of these medicines can raise blood pressure. · Learn how to check your blood pressure at home. Lifestyle changes  · Stay at a healthy weight. This is especially important if you put on weight around the waist. Losing even 10 pounds can help you lower your blood pressure. · If your doctor recommends it, get more exercise. Walking is a good choice. Bit by bit, increase the amount you walk every day. Try for at least 30 minutes on most days of the week. You also may want to swim, bike, or do other activities. · Avoid or limit alcohol. Talk to your doctor about whether you can drink any alcohol. · Try to limit how much sodium you eat to less than 2,300 milligrams (mg) a day. Your doctor may ask you to try to eat less than 1,500 mg a day. · Eat plenty of fruits (such as bananas and oranges), vegetables, legumes, whole grains, and low-fat dairy products. · Lower the amount of saturated fat in your diet. Saturated fat is found in animal products such as milk, cheese, and meat. Limiting these foods may help you lose weight and also lower your risk for heart disease. · Do not smoke. Smoking increases your risk for heart attack and stroke. If you need help quitting, talk to your doctor about stop-smoking programs and medicines. These can increase your chances of quitting for good. When should you call for help? Call 911 anytime you think you may need emergency care. This may mean having symptoms that suggest that your blood pressure is causing a serious heart or blood vessel problem. Your blood pressure may be over 180/110. ? For example, call 911 if:  ? · You have symptoms of a heart attack. These may include:  ¨ Chest pain or pressure, or a strange feeling in the chest.  ¨ Sweating. ¨ Shortness of breath. ¨ Nausea or vomiting.   ¨ Pain, pressure, or a strange feeling in the back, neck, jaw, or upper belly or in one or both shoulders or arms.  ¨ Lightheadedness or sudden weakness. ¨ A fast or irregular heartbeat. ? · You have symptoms of a stroke. These may include:  ¨ Sudden numbness, tingling, weakness, or loss of movement in your face, arm, or leg, especially on only one side of your body. ¨ Sudden vision changes. ¨ Sudden trouble speaking. ¨ Sudden confusion or trouble understanding simple statements. ¨ Sudden problems with walking or balance. ¨ A sudden, severe headache that is different from past headaches. ? · You have severe back or belly pain. ?Do not wait until your blood pressure comes down on its own. Get help right away. ?Call your doctor now or seek immediate care if:  ? · Your blood pressure is much higher than normal (such as 180/110 or higher), but you don't have symptoms. ? · You think high blood pressure is causing symptoms, such as:  ¨ Severe headache. ¨ Blurry vision. ? Watch closely for changes in your health, and be sure to contact your doctor if:  ? · Your blood pressure measures 140/90 or higher at least 2 times. That means the top number is 140 or higher or the bottom number is 90 or higher, or both. ? · You think you may be having side effects from your blood pressure medicine. ? · Your blood pressure is usually normal, but it goes above normal at least 2 times. Where can you learn more? Go to http://dileep-tuyet.info/. Enter D948 in the search box to learn more about \"High Blood Pressure: Care Instructions. \"  Current as of: September 21, 2016  Content Version: 11.4  © 8609-1907 [employer]. Care instructions adapted under license by [employer] (which disclaims liability or warranty for this information). If you have questions about a medical condition or this instruction, always ask your healthcare professional. [place] [address] any warranty or liability for your use of this information.        Hypothyroidism: Care Instructions  Your Care Instructions    You have hypothyroidism, which means that your body is not making enough thyroid hormone. This hormone helps your body use energy. If your thyroid level is low, you may feel tired, be constipated, have an increase in your blood pressure, or have dry skin or memory problems. You may also get cold easily, even when it is warm. Women with low thyroid levels may have heavy menstrual periods. A blood test to find your thyroid-stimulating hormone (TSH) level is used to check for hypothyroidism. A high TSH level may mean that you have low thyroid. When your body is not making enough thyroid hormone, TSH levels rise in an effort to make the body produce more. The treatment for hypothyroidism is to take thyroid hormone pills. You should start to feel better in 1 to 2 weeks. But it can take several months to see changes in the TSH level. You will need regular visits with your doctor to make sure you have the right dose of medicine. Most people need treatment for the rest of their lives. You will need to see your doctor regularly to have blood tests and to make sure you are doing well. Follow-up care is a key part of your treatment and safety. Be sure to make and go to all appointments, and call your doctor if you are having problems. It's also a good idea to know your test results and keep a list of the medicines you take. How can you care for yourself at home? · Take your thyroid hormone medicine exactly as prescribed. Call your doctor if you think you are having a problem with your medicine. Most people do not have side effects if they take the right amount of medicine regularly. ¨ Take the medicine 30 minutes before breakfast, and do not take it with calcium, vitamins, or iron. ¨ Do not take extra doses of your thyroid medicine. It will not help you get better any faster, and it may cause side effects. ¨ If you forget to take a dose, do NOT take a double dose of medicine.  Take your usual dose the next day. · Tell your doctor about all prescription, herbal, or over-the-counter products you take. · Take care of yourself. Eat a healthy diet, get enough sleep, and get regular exercise. When should you call for help? Call 911 anytime you think you may need emergency care. For example, call if:  ? · You passed out (lost consciousness). ? · You have severe trouble breathing. ? · You have a very slow heartbeat (less than 60 beats a minute). ? · You have a low body temperature (95°F or below). ?Call your doctor now or seek immediate medical care if:  ? · You feel tired, sluggish, or weak. ? · You have trouble remembering things or concentrating. ? · You do not begin to feel better 2 weeks after starting your medicine. ? Watch closely for changes in your health, and be sure to contact your doctor if you have any problems. Where can you learn more? Go to http://dileep-tuyet.info/. Enter Z925 in the search box to learn more about \"Hypothyroidism: Care Instructions. \"  Current as of: May 12, 2017  Content Version: 11.4  © 7637-9589 Healthwise, Incorporated. Care instructions adapted under license by [employer] (which disclaims liability or warranty for this information). If you have questions about a medical condition or this instruction, always ask your healthcare professional. [address] any warranty or liability for your use of this information.

## 2018-06-14 NOTE — PROGRESS NOTES
Satinder Call  Identified pt with two pt identifiers(name and ). Chief Complaint   Patient presents with    Weight Management     Rm 6: one month f/u to weight management       1. Have you been to the ER, urgent care clinic since your last visit? Hospitalized since your last visit? no    2. Have you seen or consulted any other health care providers outside of the [address] [place] [address] since your last visit? Include any pap smears or colon screening. no    Today's provider has been notified of reason for visit, vitals and flowsheets obtained on patients.      Patient received paperwork for advance directive during previous visit but has not completed at this time     Reviewed record In preparation for visit, huddled with provider and have obtained necessary documentation      Health Maintenance Due   Topic    COLONOSCOPY        Wt Readings from Last 3 Encounters:   18 149 lb 8 oz (67.8 kg)   18 151 lb (68.5 kg)   18 153 lb (69.4 kg)     Temp Readings from Last 3 Encounters:   18 98 °F (36.7 °C) (Temporal)   18 97.8 °F (36.6 °C) (Oral)   18 97.7 °F (36.5 °C) (Oral)     BP Readings from Last 3 Encounters:   18 116/61   18 121/70   18 119/65     Pulse Readings from Last 3 Encounters:   18 68   18 86   18 70     Vitals:    18 0851   BP: 116/61   Pulse: 68   Resp: 18   Temp: 98 °F (36.7 °C)   TempSrc: Temporal   SpO2: 97%   Weight: 149 lb 8 oz (67.8 kg)   Height: 5' 4\" (1.626 m)   PainSc:   0 - No pain         Learning Assessment:  :     Learning Assessment 2018   PRIMARY LEARNER Patient   HIGHEST LEVEL OF EDUCATION - PRIMARY LEARNER  2 YEARS OF COLLEGE   BARRIERS PRIMARY LEARNER VISUAL   CO-LEARNER CAREGIVER No   PRIMARY LANGUAGE ENGLISH   LEARNER PREFERENCE PRIMARY READING   ANSWERED BY Patient   RELATIONSHIP SELF       Depression Screening:  :     PHQ over the last two weeks 2018   Little interest or pleasure in doing things Not at all   Feeling down, depressed or hopeless Not at all   Total Score PHQ 2 0   Trouble falling or staying asleep, or sleeping too much -   Feeling tired or having little energy -   Poor appetite or overeating -   Feeling bad about yourself - or that you are a failure or have let yourself or your family down -   Trouble concentrating on things such as school, work, reading or watching TV -   Moving or speaking so slowly that other people could have noticed; or the opposite being so fidgety that others notice -   Thoughts of being better off dead, or hurting yourself in some way -   PHQ 9 Score -       Fall Risk Assessment:  :     Fall Risk Assessment, last 12 mths 5/11/2018   Able to walk? Yes   Fall in past 12 months? No       Abuse Screening:  :     Abuse Screening Questionnaire 5/11/2018 12/12/2017 7/31/2014   Do you ever feel afraid of your partner? N N N   Are you in a relationship with someone who physically or mentally threatens you? N N N   Is it safe for you to go home? Y Y Y       ADL Screening:  :     ADL Assessment 4/5/2018   Feeding yourself No Help Needed   Getting from bed to chair No Help Needed   Getting dressed No Help Needed   Bathing or showering No Help Needed   Walk across the room (includes cane/walker) No Help Needed   Using the telphone No Help Needed   Taking your medications No Help Needed   Preparing meals No Help Needed   Managing money (expenses/bills) No Help Needed   Moderately strenuous housework (laundry) No Help Needed   Shopping for personal items (toiletries/medicines) No Help Needed   Shopping for groceries No Help Needed   Driving No Help Needed   Climbing a flight of stairs No Help Needed   Getting to places beyond walking distances No Help Needed       Body Weight:  Body Fat:  Muscle Mass:   Body H2O:  BMR:  BMI:  Waist Circumference:  Weight Lost/Gained since the last visit:  Weight Lost since Surgery (if applicable):            Medication reconciliation up to date and corrected with patient at this time.

## 2018-06-14 NOTE — PROGRESS NOTES
Chief Complaint   Patient presents with    Weight Management     Rm 6: one month f/u to weight management         HPI:  The patient is a 74 y.o. female who presents today for a follow up appointment. No hospital, ER or specialist visits since last primary care visit except as noted below. Weight Mgmt:  The patient is a former patient of mine last seen at [address] [place] [address]. GBP Surgery August 20, 2012 with Dr. Nitish Bailey. Preop weight 253 pounds. Her daughter in law was supposed to have a gastric balloon placed in October at the [place]. She never had this done because they found something during the procedure they needed to address and they postponed it. She did have it have it placed subsequently and has lost approximately 55 pounds. She has been 147-150 pounds for years. At her visit on 10/16/2017 it had increased to 150-152 pounds. Her goal weight is 140-150 pounds. She is 6 years postop. She had been on Phentermine daily with good effectiveness. She also tried Contrave but felt she was not feeling well, nausea, \"felt so bad\" and just wanted to sit around. She was unable to tolerate the medication and ultimately she stopped taking it after about two weeks. Today her weight is 149 pounds with a BMI of 25.66. She has lost 2 pounds since her last visit. Her last labs were in July 2017. Taking nutritional supplements as recommended. Exercise: \"Not very much\". She started the Saxenda sample and the first week was \"great\", her hunger was well controlled. The second week she developed significant N/V and stopped the medication after the second week. She ran out of her Phentermine approximately 1 weeks ago. She has not started the Wellbutrin because Saxenda worked so well. She denies N/V unrelated to medication, GERD, night time regurgitation, abdominal pain. She c/o constipation relieved with Linzess and Miralax with relief. She is taking in about 48-50+ fluids daily.   She is still taking Saxenda injections at approximately 0.9, she reports her hunger is well controlled. Review of Systems  A comprehensive review of systems was negative except for that written in the HPI. Patient Active Problem List   Diagnosis Code    Asthma J45.909    Essential hypertension I10    Macular degeneration H35.30    Left sided sciatica M54.32    Allergic rhinitis J30.9    Hypothyroidism E03.9    Depression F32.9    Obesity E66.9    Vitamin D deficiency E55.9    Bariatric surgery status Z98.84    Hx of diabetes mellitus Z86.39       Past Medical History:   Diagnosis Date    Allergic rhinitis 1/28/2009    Asthma 1/28/2009    Bariatric surgery status 10/16/2017    Dental disorder NEC     Elevated LFT's 1/28/2009    HTN 1/28/2009    CONTROLLED BY MEDS.     Hx of diabetes mellitus 6/14/2018    Hypothyroidism 1/28/2009    Left sided sciatica 1/28/2009    Macular degeneration 1/28/2009    Morbid obesity ([address]     Musculoskeletal disorder     Sleep apnea 1/28/2009    USES C-PAP    Thyroid disease     HYPOTHYROIDISM       Past Surgical History:   Procedure Laterality Date    BREAST SURGERY PROCEDURE UNLISTED      THINKS ITS RIGHT BREAST.    HX BREAST BIOPSY Bilateral 1997    neg./ ? laterality    HX GI      COLONOSCOPY X 1    HX GYN      TOTAL VAGINAL HYSTERECTOMY    HX GYN      EXPLOROTORY FOR ECTOPIC PREGNANCY (uterine horn) BTL., DNC    HX ORTHOPAEDIC  1980    carpal tunnel release       Social History   Substance Use Topics    Smoking status: Former Smoker     Packs/day: 1.00     Years: 6.00     Quit date: 1/28/1969    Smokeless tobacco: Never Used      Comment: 1966    Alcohol use 1.0 oz/week     1 Glasses of wine, 1 Shots of liquor per week      Comment: rare       Family History   Problem Relation Age of Onset    Macular Degen Mother     Hypertension Mother     Cancer Father      lung    Hypertension Father     Breast Cancer Sister 72    Hypertension Brother        Outpatient Prescriptions Marked as Taking for the 6/14/18 encounter (Office Visit) with Klarissa Suero NP   Medication Sig Dispense Refill    liraglutide (SAXENDA) 3 mg/0.5 mL (18 mg/3 mL) pen Inject 0.6 x 1 week, then 1.2 x 1 week, then 1.8 x 1 week, then 2.4 x 1 week, then 3 x 1week 2 Adjustable Dose Pre-filled Pen Syringe 0    montelukast (SINGULAIR) 10 mg tablet TAKE 1 TABLET BY MOUTH DAILY FOR ASTHMA 30 Tab 0    Levothyroxine 137 mcg cap Take  by mouth daily.  lisinopril-hydroCHLOROthiazide (PRINZIDE, ZESTORETIC) 10-12.5 mg per tablet Take  by mouth daily.  venlafaxine (EFFEXOR) 37.5 mg tablet Take 37.5 mg by mouth daily.  cyanocobalamin (VITAMIN B-12) 500 mcg tablet Take 500 mcg by mouth daily.  linaclotide (LINZESS) 290 mcg cap capsule Take  by mouth Daily (before breakfast).  multivitamin (ONE A DAY) tablet Take 1 Tab by mouth daily.  Vit A,C,E-Zinc-Copper (PRESERVISION AREDS) Cap capsule Take 1 Cap by mouth two (2) times a day. BREAKFAST & BED TIME.           Allergies   Allergen Reactions    Cat Dander Shortness of Breath    Dog Dander Shortness of Breath    Mold Shortness of Breath    Other Plant, Animal, Environmental Shortness of Breath     Dust       PE:  Visit Vitals    /61 (BP 1 Location: Left arm, BP Patient Position: Sitting)    Pulse 68    Temp 98 °F (36.7 °C) (Temporal)    Resp 18    Ht 5' 4\" (1.626 m)    Wt 149 lb 8 oz (67.8 kg)    SpO2 97%    BMI 25.66 kg/m2     Gen: alert, oriented, no acute distress  Head: normocephalic, atraumatic  Ears: external auditory canals clear, TMs without erythema or effusion  Eyes: pupils equal round reactive to light, sclera clear, conjunctiva clear  Oral: moist mucus membranes, no oral lesions, no pharyngeal inflammation or exudate  Neck: symmetric normal sized thyroid, no carotid bruits, no jugular vein distention  Resp: no increase work of breathing, lungs clear to ausculation bilaterally, no wheezing, rales or rhonchi  CV: S1, S2 normal.  No murmurs, rubs, or gallops. Abd: soft, not tender, not distended. No hepatosplenomegaly. Normal bowel sounds. No hernias. No abdominal or renal bruits. Neuro: cranial nerves intact, normal strength and movement in all extremities, reflexes and sensation intact and symmetric. Skin: no lesion or rash  Extremities: no cyanosis or edema    Assessment/Plan:    ICD-10-CM ICD-9-CM    1. Obesity, unspecified classification, unspecified obesity type, unspecified whether serious comorbidity present E66.9 278.00 liraglutide (SAXENDA) 3 mg/0.5 mL (18 mg/3 mL) pen      URINALYSIS W/ RFLX MICROSCOPIC      LIPID PANEL      METABOLIC PANEL, COMPREHENSIVE      HEMOGLOBIN A1C WITH EAG      TSH REFLEX TO T4      VITAMIN D, 25 HYDROXY      CBC WITH AUTOMATED DIFF      VITAMIN B12      COPPER      ZINC      IRON PROFILE      PTH INTACT      FOLATE      PREALBUMIN      FERRITIN   2. Bariatric surgery status Z98.84 V45.86 liraglutide (SAXENDA) 3 mg/0.5 mL (18 mg/3 mL) pen      URINALYSIS W/ RFLX MICROSCOPIC      LIPID PANEL      METABOLIC PANEL, COMPREHENSIVE      HEMOGLOBIN A1C WITH EAG      TSH REFLEX TO T4      VITAMIN D, 25 HYDROXY      CBC WITH AUTOMATED DIFF      VITAMIN B12      COPPER      ZINC      IRON PROFILE      PTH INTACT      FOLATE      PREALBUMIN      FERRITIN   3. Vitamin D deficiency E55.9 268.9 VITAMIN D, 25 HYDROXY   4. Hypothyroidism, unspecified type E03.9 244.9 TSH REFLEX TO T4   5. Essential hypertension I10 401.9 URINALYSIS W/ RFLX MICROSCOPIC      LIPID PANEL      METABOLIC PANEL, COMPREHENSIVE      HEMOGLOBIN A1C WITH EAG      TSH REFLEX TO T4      VITAMIN D, 25 HYDROXY      CBC WITH AUTOMATED DIFF      VITAMIN B12      COPPER      ZINC      IRON PROFILE      PTH INTACT      FOLATE      PREALBUMIN      FERRITIN   6. Hx of diabetes mellitus Z86.39 V12.29 URINALYSIS W/ RFLX MICROSCOPIC      METABOLIC PANEL, COMPREHENSIVE      HEMOGLOBIN A1C WITH EAG   7.  Lipid screening Z13.220 V77.91 LIPID PANEL   8. Vitamin B12 deficiency E53.8 266.2 VITAMIN B12   9. Fatigue, unspecified type R53.83 780.79 URINALYSIS W/ RFLX MICROSCOPIC      LIPID PANEL      METABOLIC PANEL, COMPREHENSIVE      HEMOGLOBIN A1C WITH EAG      TSH REFLEX TO T4      VITAMIN D, 25 HYDROXY      CBC WITH AUTOMATED DIFF      VITAMIN B12      COPPER      ZINC      IRON PROFILE      PTH INTACT      FOLATE      PREALBUMIN      FERRITIN   10. Moderate protein-calorie malnutrition (HCC)  E44.0 263.0 liraglutide (SAXENDA) 3 mg/0.5 mL (18 mg/3 mL) pen      URINALYSIS W/ RFLX MICROSCOPIC      LIPID PANEL      METABOLIC PANEL, COMPREHENSIVE      HEMOGLOBIN A1C WITH EAG      TSH REFLEX TO T4      VITAMIN D, 25 HYDROXY      CBC WITH AUTOMATED DIFF      VITAMIN B12      COPPER      ZINC      IRON PROFILE      PTH INTACT      FOLATE      PREALBUMIN      FERRITIN     Follow-up Disposition:  Return in about 3 months (around 9/14/2018) for Medication Check, Weight Mgmt.  lab results and schedule of future lab studies reviewed with patient  reviewed diet, exercise and weight control  reviewed medications and side effects in detail    Health Maintenance reviewed - reviewed, UTD. Recommended healthy diet low in carbohydrates, fats, sodium and cholesterol. Recommended regular cardiovascular exercise 3-6 times per week for 30-60 minutes daily. Chart is reviewed and updated today in the office. Records requested for other providers patient has seen and is currently seeing. Patient was offered a choice/choices in the treatment plan today. Patient expresses understanding of the plan and agrees with recommendations. Verbal and written instructions (see AVS) provided. See patient instructions for more. Patient expresses understanding of diagnosis and treatment plan.

## 2018-06-15 LAB
25(OH)D3+25(OH)D2 SERPL-MCNC: 39.2 NG/ML (ref 30–100)
ALBUMIN SERPL-MCNC: 4.3 G/DL (ref 3.5–4.8)
ALBUMIN/GLOB SERPL: 2 {RATIO} (ref 1.2–2.2)
ALP SERPL-CCNC: 73 IU/L (ref 39–117)
ALT SERPL-CCNC: 21 IU/L (ref 0–32)
APPEARANCE UR: CLEAR
AST SERPL-CCNC: 24 IU/L (ref 0–40)
BASOPHILS # BLD AUTO: 0 X10E3/UL (ref 0–0.2)
BASOPHILS NFR BLD AUTO: 1 %
BILIRUB SERPL-MCNC: 0.3 MG/DL (ref 0–1.2)
BILIRUB UR QL STRIP: NEGATIVE
BUN SERPL-MCNC: 12 MG/DL (ref 8–27)
BUN/CREAT SERPL: 19 (ref 12–28)
CALCIUM SERPL-MCNC: 9.4 MG/DL (ref 8.7–10.3)
CHLORIDE SERPL-SCNC: 101 MMOL/L (ref 96–106)
CHOLEST SERPL-MCNC: 144 MG/DL (ref 100–199)
CO2 SERPL-SCNC: 29 MMOL/L (ref 20–29)
COLOR UR: YELLOW
COPPER SERPL-MCNC: 85 UG/DL (ref 72–166)
CREAT SERPL-MCNC: 0.63 MG/DL (ref 0.57–1)
EOSINOPHIL # BLD AUTO: 0.2 X10E3/UL (ref 0–0.4)
EOSINOPHIL NFR BLD AUTO: 3 %
ERYTHROCYTE [DISTWIDTH] IN BLOOD BY AUTOMATED COUNT: 15.1 % (ref 12.3–15.4)
EST. AVERAGE GLUCOSE BLD GHB EST-MCNC: 94 MG/DL
FERRITIN SERPL-MCNC: 178 NG/ML (ref 15–150)
FOLATE SERPL-MCNC: 18.5 NG/ML
GFR SERPLBLD CREATININE-BSD FMLA CKD-EPI: 101 ML/MIN/1.73
GFR SERPLBLD CREATININE-BSD FMLA CKD-EPI: 88 ML/MIN/1.73
GLOBULIN SER CALC-MCNC: 2.2 G/DL (ref 1.5–4.5)
GLUCOSE SERPL-MCNC: 74 MG/DL (ref 65–99)
GLUCOSE UR QL: NEGATIVE
HBA1C MFR BLD: 4.9 % (ref 4.8–5.6)
HCT VFR BLD AUTO: 41.6 % (ref 34–46.6)
HDLC SERPL-MCNC: 72 MG/DL
HGB BLD-MCNC: 13.9 G/DL (ref 11.1–15.9)
HGB UR QL STRIP: NEGATIVE
IMM GRANULOCYTES # BLD: 0 X10E3/UL (ref 0–0.1)
IMM GRANULOCYTES NFR BLD: 0 %
INTERPRETATION, 910389: NORMAL
IRON SATN MFR SERPL: 29 % (ref 15–55)
IRON SERPL-MCNC: 91 UG/DL (ref 27–139)
KETONES UR QL STRIP: NEGATIVE
LDLC SERPL CALC-MCNC: 59 MG/DL (ref 0–99)
LEUKOCYTE ESTERASE UR QL STRIP: NEGATIVE
LYMPHOCYTES # BLD AUTO: 2.2 X10E3/UL (ref 0.7–3.1)
LYMPHOCYTES NFR BLD AUTO: 37 %
MCH RBC QN AUTO: 31.2 PG (ref 26.6–33)
MCHC RBC AUTO-ENTMCNC: 33.4 G/DL (ref 31.5–35.7)
MCV RBC AUTO: 94 FL (ref 79–97)
MICRO URNS: NORMAL
MONOCYTES # BLD AUTO: 0.4 X10E3/UL (ref 0.1–0.9)
MONOCYTES NFR BLD AUTO: 6 %
NEUTROPHILS # BLD AUTO: 3.1 X10E3/UL (ref 1.4–7)
NEUTROPHILS NFR BLD AUTO: 53 %
NITRITE UR QL STRIP: NEGATIVE
PH UR STRIP: 6.5 [PH] (ref 5–7.5)
PLATELET # BLD AUTO: 458 X10E3/UL (ref 150–379)
POTASSIUM SERPL-SCNC: 4.6 MMOL/L (ref 3.5–5.2)
PREALB SERPL-MCNC: 22 MG/DL (ref 9–32)
PROT SERPL-MCNC: 6.5 G/DL (ref 6–8.5)
PROT UR QL STRIP: NEGATIVE
PTH-INTACT SERPL-MCNC: 49 PG/ML (ref 15–65)
RBC # BLD AUTO: 4.45 X10E6/UL (ref 3.77–5.28)
SODIUM SERPL-SCNC: 143 MMOL/L (ref 134–144)
SP GR UR: 1.01 (ref 1–1.03)
TIBC SERPL-MCNC: 312 UG/DL (ref 250–450)
TRIGL SERPL-MCNC: 63 MG/DL (ref 0–149)
TSH SERPL DL<=0.005 MIU/L-ACNC: 0.76 UIU/ML (ref 0.45–4.5)
UIBC SERPL-MCNC: 221 UG/DL (ref 118–369)
UROBILINOGEN UR STRIP-MCNC: 0.2 MG/DL (ref 0.2–1)
VIT B12 SERPL-MCNC: 1688 PG/ML (ref 232–1245)
VLDLC SERPL CALC-MCNC: 13 MG/DL (ref 5–40)
WBC # BLD AUTO: 5.9 X10E3/UL (ref 3.4–10.8)
ZINC SERPL-MCNC: 118 UG/DL (ref 56–134)

## 2018-06-25 NOTE — PROGRESS NOTES
Your lab results have been reviewed and are as follows:    Urine Test:  Your urine analysis is normal.    Cholesterol Panel:  Excellent! Total Cholesterol is 144 (goal <200). Triglycerides are 63.  (goal <150)  Your HDL or \"good\" cholesterol is 72. (goal >40). Your LDL or \"bad\" cholesterol is 59.  (goal <100). CMP:  Fasting blood sugar is normal at 74, your hemoglobin A1C is 4.9. You do not have diabetes. Kidney function is normal.   Your electrolytes are normal.   Your liver function is normal.     Thyroid:  Your thyroid function is normal.    Vitamin D is normal. Try to get 10-15 minutes of sunlight daily without burning and try to eat foods such as low fat dairy which is Vitamin D fortified. CBC:  Your red blood cell count is normal.   Your white blood cell count is normal.   Your platelet count is abnormal, your platelets are 731. Please discuss with Dr. Selam Benitez for further evaluation. You are not anemic and your hemoglobin is 13.9. Your Vitamin B12 is high but excess B12 will be eliminated in your urine. Your prealbumin level is normal.  Your zinc and copper levels are normal.  Your folate level is normal.  Your PTH level is normal.  Your Ferritin level is normal.  Your iron labs are all normal.    Sharon Francis, MSN, MHA, FNP-BC

## 2018-07-05 ENCOUNTER — PATIENT MESSAGE (OUTPATIENT)
Dept: FAMILY MEDICINE CLINIC | Age: 73
End: 2018-07-05

## 2018-07-05 DIAGNOSIS — Z98.84 BARIATRIC SURGERY STATUS: ICD-10-CM

## 2018-07-05 DIAGNOSIS — E44.0 MODERATE PROTEIN-CALORIE MALNUTRITION (HCC): ICD-10-CM

## 2018-07-05 DIAGNOSIS — E66.9 OBESITY, UNSPECIFIED CLASSIFICATION, UNSPECIFIED OBESITY TYPE, UNSPECIFIED WHETHER SERIOUS COMORBIDITY PRESENT: ICD-10-CM

## 2018-07-05 NOTE — TELEPHONE ENCOUNTER
From: Jennifer Smart  Sent: 7/5/2018 10:48 AM EDT  Subject: Prescription Question    May I come by and     2 boxes of Saxenda    and a copy of my lab work from 6/14/2018 for Dr. Licha Cadet?

## 2018-08-10 ENCOUNTER — TELEPHONE (OUTPATIENT)
Dept: FAMILY MEDICINE CLINIC | Age: 73
End: 2018-08-10

## 2018-09-06 ENCOUNTER — OFFICE VISIT (OUTPATIENT)
Dept: FAMILY MEDICINE CLINIC | Age: 73
End: 2018-09-06

## 2018-09-06 VITALS
WEIGHT: 145 LBS | BODY MASS INDEX: 24.75 KG/M2 | SYSTOLIC BLOOD PRESSURE: 115 MMHG | HEIGHT: 64 IN | DIASTOLIC BLOOD PRESSURE: 63 MMHG | OXYGEN SATURATION: 97 % | HEART RATE: 65 BPM | RESPIRATION RATE: 18 BRPM | TEMPERATURE: 97.4 F

## 2018-09-06 DIAGNOSIS — Z98.84 BARIATRIC SURGERY STATUS: ICD-10-CM

## 2018-09-06 DIAGNOSIS — J45.20 MILD INTERMITTENT ASTHMA WITHOUT COMPLICATION: Chronic | ICD-10-CM

## 2018-09-06 DIAGNOSIS — E66.9 OBESITY, UNSPECIFIED CLASSIFICATION, UNSPECIFIED OBESITY TYPE, UNSPECIFIED WHETHER SERIOUS COMORBIDITY PRESENT: Primary | ICD-10-CM

## 2018-09-06 DIAGNOSIS — E53.8 VITAMIN B12 DEFICIENCY: ICD-10-CM

## 2018-09-06 RX ORDER — PHENTERMINE HYDROCHLORIDE 37.5 MG/1
37.5 TABLET ORAL
Qty: 90 TAB | Refills: 0 | Status: SHIPPED | OUTPATIENT
Start: 2018-09-06 | End: 2020-05-21 | Stop reason: ALTCHOICE

## 2018-09-06 RX ORDER — MONTELUKAST SODIUM 10 MG/1
10 TABLET ORAL DAILY
Qty: 90 TAB | Refills: 3 | Status: SHIPPED | OUTPATIENT
Start: 2018-09-06 | End: 2019-02-28 | Stop reason: ALTCHOICE

## 2018-09-06 NOTE — PATIENT INSTRUCTIONS
Asthma: Your Action Plan  Sample Action Plan    Controller medicine action plan  Fill in the blank spaces and boxes that apply for all sections. · Name of your controller medicine:  ¨ ____________________________________________  · How much of this medicine do you take? ¨ ____________________________________________  · How often do you take this medicine? ¨ ____________________________________________  · Other instructions? ¨ ____________________________________________  Quick-relief medicine action plan  · Name of your quick-relief medicine:  ¨ ____________________________________________  · How much of this medicine do you take? ¨ ____________________________________________  · How often do you take this medicine? ¨ ____________________________________________  Asthma Zones  GREEN ZONE: This is where you want to be! Green zone symptoms  · You have no shortness of breath or chest tightness. You are not coughing or wheezing. · You can do all of your usual activities. · You sleep well at night. Green zone peak flow (if you use a peak flow meter)  · ______ or more (80% or more of your personal best)  Green zone actions (Check the boxes and fill in the blank spaces that apply.)  [ ] You take your controller medicine(s) every day. [ ] Hayden Bhatti are staying away from your asthma triggers. [ ] You take quick-relief medicine (called _____________________) ______ minutes before exercise. YELLOW ZONE: Your asthma is getting worse. Yellow zone symptoms  · You are short of breath or have chest tightness. You are coughing or wheezing. · You have symptoms that keep you up at night. · You can do some, but not all, of your usual activities. Yellow zone peak flow (if you use a peak flow meter)  · ______ to ______ (50% to 79% of your personal best)  Yellow zone actions (Check the boxes and fill in the blank spaces that apply.)  [ ] Take _____ puff(s) of quick-relief medicine called ______________________.  Repeat _____ times. [ ] If your symptoms don't get better or your peak flow has not returned to the green zone in 1 hour, then:  · [ ] Take _____ puff(s) of medicine called ______________________. Take it ____ times a day. · [ ] Begin or increase treatment with corticosteroid pills. Take ______ mg of medicine called ____________________________ every __________. · [ ] Call your doctor at this number: ____________________. RED ZONE: Danger! Red zone symptoms  · You are very short of breath. · You can't do your usual activities. · Quick-relief medicine doesn't help. Or your symptoms don't get better after 24 hours in the yellow zone. Red zone peak flow (if you use a peak flow meter)  · Less than _______ (less than 50% of your personal best)  Red zone actions (Check the boxes and fill in the blank spaces that apply.)  [ ] Take _____ puff(s) of quick-relief medicine called ____________________________. Repeat ______ times. [ ] Begin or increase treatment with corticosteroid pills. Take ________ mg now. [ ] Call your doctor at this number: _________________. If you can't contact your doctor, go to the emergency department. Call 911 or ___________________. [ ] Other numbers you might call are: ___________________________________. When should you call for help? Call 911 anytime you think you may need emergency care. For example, call if:  · You have severe trouble breathing. Call your doctor now or seek immediate medical care if:  · You are in the red zone of your asthma action plan. · You've used your quick-relief medicine but are still having trouble breathing. · You cough up blood. · You have new or worse trouble breathing. · You cough up dark brown or bloody mucus (sputum). Watch closely for changes in your health, and be sure to contact your doctor if:  · You need to use quick-relief medicine more than 2 days each week (unless it's just for exercise). · Your coughing and wheezing get worse.   Follow-up care is a key part of your treatment and safety. Be sure to make and go to all appointments, and call your doctor if you are having problems. It's also a good idea to know your test results and keep a list of the medicines you take. Where can you learn more? Go to http://dileep-tuyet.info/. Enter 32 54 [id number] in the search box to learn more about \"Asthma: Your Action Plan. \"  Current as of: December 6, 2017  Content Version: 11.7  © 6263-2053 [employer]. Care instructions adapted under license by [employer] (which disclaims liability or warranty for this information). If you have questions about a medical condition or this instruction, always ask your healthcare professional. [address] any warranty or liability for your use of this information. Learning About Obesity  What is obesity? Obesity means having so much body fat that your health is in danger. Having too much body fat can lead to type 2 diabetes, heart disease, high blood pressure, arthritis, sleep apnea, and stroke. Even if you don't feel bad now, think about these health risks. Do they seem like a good reason to start on a new path toward a healthier weight? Or do you have another personal, powerful reason for wanting to lose weight? Whatever it is, keep it in mind. It can be hard to change eating habits and exercise habits. But with your own reason and plan, you can do it. How do you know if your weight is in the obesity range? To know if your weight is in the obesity range, your doctor looks at your body mass index (BMI) and waist size. Your BMI is a number that is calculated from your weight and your height. To figure your BMI for yourself, get a BMI table from your doctor or use an online tool, such as http://www.vega.com/ on the [employer] of [employer]. A healthy BMI is from 18.5 to 24.9.  If your BMI is from 30.0 to 39.9, you are considered to have obesity. If your BMI is over 40.0, you are considered to have extreme obesity. What causes obesity? When you take in more calories than you burn off, you gain weight. How you eat, how active you are, and other things affect how your body uses calories and whether you gain weight. If you have family members who have too much body fat, you may have inherited a tendency to gain weight. And your family also helps form your eating and lifestyle habits, which can lead to obesity. Also, our busy lives make it harder to plan and cook healthy meals. For many of us, it's easier to reach for prepared foods, go out to eat, or go to the drive-through. But these foods are often high in saturated fat and calories. Portions are often too large. What can you do to reach a healthy weight? Focus on health, not diets. Diets are hard to stay on and don't work in the long run. It is very hard to stay with a diet that includes lots of big changes in your eating habits. Instead of a diet, focus on lifestyle changes that will improve your health and achieve the right balance of energy and calories. To lose weight, you need to burn more calories than you take in. You can do it by eating healthy foods in reasonable amounts and becoming more active, even a little bit every day. Making small changes over time can add up to a lot. Make a plan for change. Many people have found that naming their reasons for change and staying focused on their plan can make a big difference. Work with your doctor to create a plan that is right for you. · Ask yourself: Didier Crane are my personal, most powerful reasons for wanting this change? What will my life look like when I've made the change? \"  · Set your long-term goal. Make it specific, such as \"I will lose x pounds. \"  · Break your long-term goal into smaller, short-term goals. Make these small steps specific and within your reach, things you know you can do.  These steps are what keep you going from day to day. Talk with your doctor about other weight-loss options. If you have a BMI in a certain range and have not been able to lose weight with diet and exercise, medicine or surgery may be an option for you. Before your doctor will prescribe medicines or surgery, he or she will probably want you to be more active and follow your healthy eating plan for a period of time. These habits are key lifelong changes for managing your weight, with or without other medical treatment. And these changes can help you avoid weight-related health problems. How can you stay on your plan for change? Be ready. Choose to start during a time when there are few events that might trigger slip-ups, like holidays, social events, and high-stress periods. Decide on your first few steps. Most people have more success when they make small changes, one step at a time. For example, you might switch a daily candy bar to a piece of fruit, walk 10 minutes more, or add more vegetables to a meal.  Line up your support people. Make sure you're not going to be alone as you make this change. Connect with people who understand how important it is to you. Ask family members and friends for help in keeping with your plan. And think about who could make it harder for you, and how to handle them. Try tracking. People who keep track of what they eat, feel, and do are better at losing weight. Try writing down things like:  · What and how much you eat. · How you feel before and after each meal.  · Details about each meal (like eating out or at home, eating alone, or with friends or family). · What you do to be active. Look and plan. As you track, look for patterns that you may want to change. Take note of:  · When you eat and whether you skip meals. · How often you eat out. · How many fruits and vegetables you eat. · When you eat beyond feeling full.   · When and why you eat for reasons other than being hungry. When you stray from your plan, don't get upset. Figure out what made you slip up and how you can fix it. Can you take medicines or have surgery to lose weight? If you have a BMI in a certain range and have not been able to lose weight with diet and exercise, medicine or surgery may be an option for you. If you have a BMI of at least 30.0 (or a BMI of at least 27.0 and another health problem related to your weight), ask your doctor about weight-loss medicines. They work by making you feel less hungry, making you feel full more quickly, or changing how you digest fat. Medicines are used along with diet changes and more physical activity to help you make lasting changes. If you have a BMI of 40.0 or more (or a BMI of 35.0 or more and another health problem related to your weight), your doctor may talk with you about surgery. Weight-loss surgery has risks, and you will need to work with your doctor to compare the risk of having obesity with the risks of surgery. With any option you choose, you will still need to eat a healthy diet and get regular exercise. Follow-up care is a key part of your treatment and safety. Be sure to make and go to all appointments, and call your doctor if you are having problems. It's also a good idea to know your test results and keep a list of the medicines you take. Where can you learn more? Go to http://dileep-tuyet.info/. Enter N111 in the search box to learn more about \"Learning About Obesity. \"  Current as of: October 9, 2017  Content Version: 11.7  © 4510-2672 [employer]. Care instructions adapted under license by [employer] (which disclaims liability or warranty for this information). If you have questions about a medical condition or this instruction, always ask your healthcare professional. [address] any warranty or liability for your use of this information.

## 2018-09-06 NOTE — PROGRESS NOTES
Chief Complaint   Patient presents with    Weight Management         HPI:  The patient is a 74 y.o. female who presents today for a follow up appointment. No hospital, ER or specialist visits since last primary care visit except as noted below. Weight Mgmt:  The patient is a former patient of mine last seen at [address] [place] [address]. GBP Surgery August 20, 2012 with Dr. Kali Bernstein. Preop weight 253 pounds. Her daughter in law was supposed to have a gastric balloon placed in October at the [place]. She never had this done because they found something during the procedure they needed to address and they postponed it. She did have it have it placed subsequently and has lost approximately 55 pounds. She has been 147-150 pounds for years. At her visit on 10/16/2017 it had increased to 150-152 pounds. Her goal weight is 135-140  pounds. She is 6 years postop. She had been on Phentermine daily with good effectiveness. She also tried Contrave but felt she was not feeling well, nausea, \"felt so bad\" and just wanted to sit around. She was unable to tolerate the medication and ultimately she stopped taking it after about two weeks. Today her weight is 145 pounds with a BMI of 24.89. She has lost 4 pounds since her last visit. Her last labs were in June 2018. Taking nutritional supplements as recommended. Exercise: \"Not very much\". She started the Saxenda sample and the first week was \"great\", her hunger was well controlled. The second week she developed significant N/V and stopped the medication after the second week. She ran out of her Phentermine approximately 1 weeks ago. She has not started the Wellbutrin because Saxenda worked so well. She denies N/V unrelated to medication, GERD, night time regurgitation, abdominal pain. She c/o constipation relieved with Linzess and Miralax with relief. She is taking in about 48-50+ fluids daily.   She is still taking Saxenda injections at approximately 0.9, she reports her hunger is well controlled. Asthma/Allergic Rhinitis:  Ran out of Singulair, her symptoms have returned. Would like to restart this medication today. Review of Systems  A comprehensive review of systems was negative except for that written in the HPI. Patient Active Problem List   Diagnosis Code    Asthma J45.909    Essential hypertension I10    Macular degeneration H35.30    Left sided sciatica M54.32    Allergic rhinitis J30.9    Hypothyroidism E03.9    Depression F32.9    Obesity E66.9    Vitamin D deficiency E55.9    Bariatric surgery status Z98.84    Hx of diabetes mellitus Z86.39       Past Medical History:   Diagnosis Date    Allergic rhinitis 1/28/2009    Asthma 1/28/2009    Bariatric surgery status 10/16/2017    Dental disorder NEC     Elevated LFT's 1/28/2009    HTN 1/28/2009    CONTROLLED BY MEDS.     Hx of diabetes mellitus 6/14/2018    Hypothyroidism 1/28/2009    Left sided sciatica 1/28/2009    Macular degeneration 1/28/2009    Morbid obesity ([address]     Musculoskeletal disorder     Sleep apnea 1/28/2009    USES C-PAP    Thyroid disease     HYPOTHYROIDISM       Past Surgical History:   Procedure Laterality Date    BREAST SURGERY PROCEDURE UNLISTED      THINKS ITS RIGHT BREAST.    HX BREAST BIOPSY Bilateral 1997    neg./ ? laterality    HX GI      COLONOSCOPY X 1    HX GYN      TOTAL VAGINAL HYSTERECTOMY    HX GYN      EXPLOROTORY FOR ECTOPIC PREGNANCY (uterine horn) BTL., DNC    HX ORTHOPAEDIC  1980    carpal tunnel release       Social History   Substance Use Topics    Smoking status: Former Smoker     Packs/day: 1.00     Years: 6.00     Quit date: 1/28/1969    Smokeless tobacco: Never Used      Comment: 1966    Alcohol use 1.0 oz/week     1 Glasses of wine, 1 Shots of liquor per week      Comment: rare       Family History   Problem Relation Age of Onset    Macular Degen Mother     Hypertension Mother     Cancer Father      lung    Hypertension Father    24 \[facility] [place] [facility]\"" Breast Cancer Sister 72    Hypertension Brother        Outpatient Prescriptions Marked as Taking for the 9/6/18 encounter (Office Visit) with Jeremy Rm, NP   Medication Sig Dispense Refill    montelukast (SINGULAIR) 10 mg tablet Take 1 Tab by mouth daily. 90 Tab 3    phentermine (ADIPEX-P) 37.5 mg tablet Take 1 Tab by mouth every morning. Max Daily Amount: 37.5 mg. 90 Tab 0    Levothyroxine 137 mcg cap Take  by mouth daily.  lisinopril-hydroCHLOROthiazide (PRINZIDE, ZESTORETIC) 10-12.5 mg per tablet Take  by mouth daily.  venlafaxine (EFFEXOR) 37.5 mg tablet Take 37.5 mg by mouth daily.  cyanocobalamin (VITAMIN B-12) 500 mcg tablet Take 500 mcg by mouth daily.  linaclotide (LINZESS) 290 mcg cap capsule Take  by mouth Daily (before breakfast).  multivitamin (ONE A DAY) tablet Take 1 Tab by mouth daily.  Vit A,C,E-Zinc-Copper (PRESERVISION AREDS) Cap capsule Take 1 Cap by mouth two (2) times a day. BREAKFAST & BED TIME.           Allergies   Allergen Reactions    Cat Dander Shortness of Breath    Dog Dander Shortness of Breath    Mold Shortness of Breath    Other Plant, Animal, Environmental Shortness of Breath     Dust       PE:  Visit Vitals    /63 (BP 1 Location: Left arm, BP Patient Position: Sitting)    Pulse 65    Temp 97.4 °F (36.3 °C) (Temporal)    Resp 18    Ht 5' 4\" (1.626 m)    Wt 145 lb (65.8 kg)    SpO2 97%    BMI 24.89 kg/m2     Gen: alert, oriented, no acute distress  Head: normocephalic, atraumatic  Ears: external auditory canals clear, TMs without erythema or effusion  Eyes: pupils equal round reactive to light, sclera clear, conjunctiva clear  Oral: moist mucus membranes, no oral lesions, no pharyngeal inflammation or exudate  Neck: symmetric normal sized thyroid, no carotid bruits, no jugular vein distention  Resp: no increase work of breathing, lungs clear to ausculation bilaterally, no wheezing, rales or rhonchi  CV: S1, S2 normal.  No murmurs, rubs, or gallops. Abd: soft, not tender, not distended. No hepatosplenomegaly. Normal bowel sounds. No hernias. No abdominal or renal bruits. Neuro: cranial nerves intact, normal strength and movement in all extremities, reflexes and sensation intact and symmetric. Skin: no lesion or rash  Extremities: no cyanosis or edema    Assessment/Plan:    ICD-10-CM ICD-9-CM    1. Obesity, unspecified classification, unspecified obesity type, unspecified whether serious comorbidity present E66.9 278.00 phentermine (ADIPEX-P) 37.5 mg tablet   2. Mild intermittent asthma without complication E53.96 351.49 montelukast (SINGULAIR) 10 mg tablet   3. Bariatric surgery status Z98.84 V45.86 phentermine (ADIPEX-P) 37.5 mg tablet   4. Vitamin B12 deficiency E53.8 266.2      Follow-up Disposition:  Return in about 3 months (around 12/6/2018) for Medication Check, Weight Mgmt.  lab results and schedule of future lab studies reviewed with patient  reviewed diet, exercise and weight control  reviewed medications and side effects in detail    Health Maintenance reviewed - deferred to PCP. Recommended healthy diet low in carbohydrates, fats, sodium and cholesterol. Recommended regular cardiovascular exercise 3-6 times per week for 30-60 minutes daily. Chart is reviewed and updated today in the office. Records requested for other providers patient has seen and is currently seeing. Patient was offered a choice/choices in the treatment plan today. Patient expresses understanding of the plan and agrees with recommendations. Verbal and written instructions (see AVS) provided. See patient instructions for more. Patient expresses understanding of diagnosis and treatment plan.

## 2018-09-06 NOTE — MR AVS SNAPSHOT
[address] [facility] 
[address] [place] [address] 
[phone] Patient: Jazz Casanova MRN: [id number] [id number] Visit Information Date & Time Provider Department Dept. Phone Encounter #  
 9/6/2018  8:30 AM Lydia Tyson NP [facility] [id number] [id number] Follow-up Instructions Return in about 3 months (around 12/6/2018) for Medication Check, Weight Mgmt. Upcoming Health Maintenance Date Due Influenza Age 5 to Adult 8/1/2018 COLONOSCOPY 8/13/2018 MEDICARE YEARLY EXAM 12/13/2018 GLAUCOMA SCREENING Q2Y 8/9/2019 DTaP/Tdap/Td series (2 - Td) 11/14/2022 Allergies as of 9/6/2018  Review Complete On: 9/6/2018 By: Lydia Tyson NP Severity Noted Reaction Type Reactions Cat Dander High 10/16/2017    Shortness of Breath Dog Dander High 10/16/2017    Shortness of Breath Mold High 10/16/2017    Shortness of Breath Other Plant, Animal, Environmental High 10/16/2017    Shortness of Breath Dust  
  
Current Immunizations  Reviewed on 6/14/2018 No immunizations on file. Not reviewed this visit You Were Diagnosed With   
  
 Codes Comments Obesity, unspecified classification, unspecified obesity type, unspecified whether serious comorbidity present    -  Primary ICD-10-CM: E66.9 ICD-9-CM: 278.00 Mild intermittent asthma without complication     [facility]-WD: J45.20 ICD-9-CM: 493.90 Bariatric surgery status     ICD-10-CM: Z98.84 ICD-9-CM: V45.86 Vitamin B12 deficiency     ICD-10-CM: E53.8 ICD-9-CM: 266.2 Vitals BP Pulse Temp Resp Height(growth percentile)  
 115/63 (BP 1 Location: Left arm, BP Patient Position: Sitting) 65 97.4 °F (36.3 °C) (Temporal) 18 5' 4\" (1.626 m) Weight(growth percentile) SpO2 BMI OB Status Smoking Status 145 lb (65.8 kg) 97% 24.89 kg/m2 Hysterectomy Former Smoker BMI and BSA Data Body Mass Index Body Surface Area  
 24.89 kg/m 2 1.72 m 2 Preferred Pharmacy Pharmacy Name Phone [id number] [phone] Your Updated Medication List  
  
   
This list is accurate as of 9/6/18  9:47 AM.  Always use your most recent med list.  
  
  
  
  
 Levothyroxine 137 mcg Cap Take  by mouth daily. LINZESS 290 mcg Cap capsule Generic drug:  linaclotide Take  by mouth Daily (before breakfast). lisinopril-hydroCHLOROthiazide 10-12.5 mg per tablet Commonly known as:  David Decent Take  by mouth daily. montelukast 10 mg tablet Commonly known as:  SINGULAIR Take 1 Tab by mouth daily. multivitamin tablet Commonly known as:  ONE A DAY Take 1 Tab by mouth daily. phentermine 37.5 mg tablet Commonly known as:  ADIPEX-P Take 1 Tab by mouth every morning. Max Daily Amount: 37.5 mg. PRESERVISION AREDS Cap capsule Generic drug:  Vit A,C,E-Zinc-Copper Take 1 Cap by mouth two (2) times a day. BREAKFAST & BED TIME. venlafaxine 37.5 mg tablet Commonly known as:  [facility] Take 37.5 mg by mouth daily. VITAMIN B-12 500 mcg tablet Generic drug:  cyanocobalamin Take 500 mcg by mouth daily. Prescriptions Printed Refills  
 phentermine (ADIPEX-P) 37.5 mg tablet 0 Sig: Take 1 Tab by mouth every morning. Max Daily Amount: 37.5 mg.  
 Class: Print Route: Oral  
  
Prescriptions Sent to Pharmacy Refills  
 montelukast (SINGULAIR) 10 mg tablet 3 Sig: Take 1 Tab by mouth daily. Class: Normal  
 [facility] [address] [place] [address]  #: [phone] Route: Oral  
  
Follow-up Instructions Return in about 3 months (around 12/6/2018) for Medication Check, Weight Mgmt. Patient Instructions Asthma: Your Action Plan Sample Action Plan Controller medicine action plan Fill in the blank spaces and boxes that apply for all sections. · Name of your controller medicine: 
¨ ____________________________________________ · How much of this medicine do you take? ¨ ____________________________________________ · How often do you take this medicine? ¨ ____________________________________________ · Other instructions? ¨ ____________________________________________ Quick-relief medicine action plan · Name of your quick-relief medicine: 
¨ ____________________________________________ · How much of this medicine do you take? ¨ ____________________________________________ · How often do you take this medicine? ¨ ____________________________________________ Asthma Zones GREEN ZONE: This is where you want to be! Green zone symptoms · You have no shortness of breath or chest tightness. You are not coughing or wheezing. · You can do all of your usual activities. · You sleep well at night. Green zone peak flow (if you use a peak flow meter) · ______ or more (80% or more of your personal best) Green zone actions (Check the boxes and fill in the blank spaces that apply.) [ ] You take your controller medicine(s) every day. [ ] Eugenie Lal are staying away from your asthma triggers. [ ] You take quick-relief medicine (called _____________________) ______ minutes before exercise. YELLOW ZONE: Your asthma is getting worse. Yellow zone symptoms · You are short of breath or have chest tightness. You are coughing or wheezing. · You have symptoms that keep you up at night. · You can do some, but not all, of your usual activities. Yellow zone peak flow (if you use a peak flow meter) · ______ to ______ (50% to 79% of your personal best) Yellow zone actions (Check the boxes and fill in the blank spaces that apply.) [ ] Take _____ puff(s) of quick-relief medicine called ______________________. Repeat _____ times.  
[ ] If your symptoms don't get better or your peak flow has not returned to the green zone in 1 hour, then: · [ ] Take _____ puff(s) of medicine called ______________________. Take it ____ times a day. · [ ] Begin or increase treatment with corticosteroid pills. Take ______ mg of medicine called ____________________________ every __________. · [ ] Call your doctor at this number: ____________________. RED ZONE: Danger! Red zone symptoms · You are very short of breath. · You can't do your usual activities. · Quick-relief medicine doesn't help. Or your symptoms don't get better after 24 hours in the yellow zone. Red zone peak flow (if you use a peak flow meter) · Less than _______ (less than 50% of your personal best) Red zone actions (Check the boxes and fill in the blank spaces that apply.) [ ] Take _____ puff(s) of quick-relief medicine called ____________________________. Repeat ______ times. [ ] Begin or increase treatment with corticosteroid pills. Take ________ mg now. [ ] Call your doctor at this number: _________________. If you can't contact your doctor, go to the emergency department. Call 911 or ___________________. [ ] Other numbers you might call are: ___________________________________. When should you call for help? Call 911 anytime you think you may need emergency care. For example, call if: 
· You have severe trouble breathing. Call your doctor now or seek immediate medical care if: 
· You are in the red zone of your asthma action plan. · You've used your quick-relief medicine but are still having trouble breathing. · You cough up blood. · You have new or worse trouble breathing. · You cough up dark brown or bloody mucus (sputum). Watch closely for changes in your health, and be sure to contact your doctor if: 
· You need to use quick-relief medicine more than 2 days each week (unless it's just for exercise). · Your coughing and wheezing get worse. Follow-up care is a key part of your treatment and safety.  Be sure to make and go to all appointments, and call your doctor if you are having problems. It's also a good idea to know your test results and keep a list of the medicines you take. Where can you learn more? Go to http://dileep-tuyet.info/. Enter 32 [id number] in the search box to learn more about \"Asthma: Your Action Plan. \" Current as of: December 6, 2017 Content Version: 11.7 © 1762-3890 [employer]. Care instructions adapted under license by [employer] (which disclaims liability or warranty for this information). If you have questions about a medical condition or this instruction, always ask your healthcare professional. [address] any warranty or liability for your use of this information. Learning About Obesity What is obesity? Obesity means having so much body fat that your health is in danger. Having too much body fat can lead to type 2 diabetes, heart disease, high blood pressure, arthritis, sleep apnea, and stroke. Even if you don't feel bad now, think about these health risks. Do they seem like a good reason to start on a new path toward a healthier weight? Or do you have another personal, powerful reason for wanting to lose weight? Whatever it is, keep it in mind. It can be hard to change eating habits and exercise habits. But with your own reason and plan, you can do it. How do you know if your weight is in the obesity range? To know if your weight is in the obesity range, your doctor looks at your body mass index (BMI) and waist size. Your BMI is a number that is calculated from your weight and your height. To figure your BMI for yourself, get a BMI table from your doctor or use an online tool, such as http://www.vega.com/ on the [employer]. A healthy BMI is from 18.5 to 24.9.  If your BMI is from 30.0 to 39.9, you are considered to have obesity. If your BMI is over 40.0, you are considered to have extreme obesity. What causes obesity? When you take in more calories than you burn off, you gain weight. How you eat, how active you are, and other things affect how your body uses calories and whether you gain weight. If you have family members who have too much body fat, you may have inherited a tendency to gain weight. And your family also helps form your eating and lifestyle habits, which can lead to obesity. Also, our busy lives make it harder to plan and cook healthy meals. For many of us, it's easier to reach for prepared foods, go out to eat, or go to the drive-through. But these foods are often high in saturated fat and calories. Portions are often too large. What can you do to reach a healthy weight? Focus on health, not diets. Diets are hard to stay on and don't work in the long run. It is very hard to stay with a diet that includes lots of big changes in your eating habits. Instead of a diet, focus on lifestyle changes that will improve your health and achieve the right balance of energy and calories. To lose weight, you need to burn more calories than you take in. You can do it by eating healthy foods in reasonable amounts and becoming more active, even a little bit every day. Making small changes over time can add up to a lot. Make a plan for change. Many people have found that naming their reasons for change and staying focused on their plan can make a big difference. Work with your doctor to create a plan that is right for you. · Ask yourself: Donnalee Rubinstein are my personal, most powerful reasons for wanting this change? What will my life look like when I've made the change? \" · Set your long-term goal. Make it specific, such as \"I will lose x pounds. \" 
· Break your long-term goal into smaller, short-term goals. Make these small steps specific and within your reach, things you know you can do. These steps are what keep you going from day to day. Talk with your doctor about other weight-loss options. If you have a BMI in a certain range and have not been able to lose weight with diet and exercise, medicine or surgery may be an option for you. Before your doctor will prescribe medicines or surgery, he or she will probably want you to be more active and follow your healthy eating plan for a period of time. These habits are key lifelong changes for managing your weight, with or without other medical treatment. And these changes can help you avoid weight-related health problems. How can you stay on your plan for change? Be ready. Choose to start during a time when there are few events that might trigger slip-ups, like holidays, social events, and high-stress periods. Decide on your first few steps. Most people have more success when they make small changes, one step at a time. For example, you might switch a daily candy bar to a piece of fruit, walk 10 minutes more, or add more vegetables to a meal. 
Line up your support people. Make sure you're not going to be alone as you make this change. Connect with people who understand how important it is to you. Ask family members and friends for help in keeping with your plan. And think about who could make it harder for you, and how to handle them. Try tracking. People who keep track of what they eat, feel, and do are better at losing weight. Try writing down things like: · What and how much you eat. · How you feel before and after each meal. 
· Details about each meal (like eating out or at home, eating alone, or with friends or family). · What you do to be active. Look and plan. As you track, look for patterns that you may want to change. Take note of: · When you eat and whether you skip meals. · How often you eat out. · How many fruits and vegetables you eat. · When you eat beyond feeling full. · When and why you eat for reasons other than being hungry. When you stray from your plan, don't get upset. Figure out what made you slip up and how you can fix it. Can you take medicines or have surgery to lose weight? If you have a BMI in a certain range and have not been able to lose weight with diet and exercise, medicine or surgery may be an option for you. If you have a BMI of at least 30.0 (or a BMI of at least 27.0 and another health problem related to your weight), ask your doctor about weight-loss medicines. They work by making you feel less hungry, making you feel full more quickly, or changing how you digest fat. Medicines are used along with diet changes and more physical activity to help you make lasting changes. If you have a BMI of 40.0 or more (or a BMI of 35.0 or more and another health problem related to your weight), your doctor may talk with you about surgery. Weight-loss surgery has risks, and you will need to work with your doctor to compare the risk of having obesity with the risks of surgery. With any option you choose, you will still need to eat a healthy diet and get regular exercise. Follow-up care is a key part of your treatment and safety. Be sure to make and go to all appointments, and call your doctor if you are having problems. It's also a good idea to know your test results and keep a list of the medicines you take. Where can you learn more? Go to http://dileep-tuyet.info/. Enter N111 in the search box to learn more about \"Learning About Obesity. \" Current as of: October 9, 2017 Content Version: 11.7 © 8893-7250 [employer]. Care instructions adapted under license by [employer] (which disclaims liability or warranty for this information).  If you have questions about a medical condition or this instruction, always ask your healthcare professional. [address] any warranty or liability for your use of this information. Introducing [facility]! Dear Emile Bruno: Thank you for requesting a Knowledgestreem account. Our records indicate that you already have an active Knowledgestreem account. You can access your account anytime at [email or web address] Did you know that you can access your hospital and ER discharge instructions at any time in Knowledgestreem? You can also review all of your test results from your hospital stay or ER visit. Additional Information If you have questions, please visit the Frequently Asked Questions section of the Knowledgestreem website at [email or web address] Remember, Knowledgestreem is NOT to be used for urgent needs. For medical emergencies, dial 911. Now available from your iPhone and Android! Please provide this summary of care documentation to your next provider. Your primary care clinician is listed as Dwight Styles. If you have any questions after today's visit, please call [phone].

## 2018-09-06 NOTE — PROGRESS NOTES
Joselyn Braun  Identified pt with two pt identifiers(name and ). Chief Complaint   Patient presents with    Weight Management         1. Have you been to the ER, urgent care clinic since your last visit? Hospitalized since your last visit? NO    2. Have you seen or consulted any other health care providers outside of the [address] [place] [address] since your last visit? Include any pap smears or colon screening. NO      Advance Care Planning    In the event something were to happen to you and you were unable to speak on your behalf, do you have an Advance Directive/ Living Will in place stating your wishes? YES    If yes, do we have a copy on file YES    If no, would you like information NO    My Chart     My chart gives you direct online access to portions of the electronic medical record (EMR) where your doctor stores your health information (ie, lab results, appointment information, medications, immunizations, and more. It is free. Would you like to set up your my chart? YES already signed up      Today's provider has been notified of reason for visit, vitals and flowsheets obtained on patients.      Reviewed record In preparation for visit, huddled with provider and have obtained necessary documentation      Health Maintenance Due   Topic    Influenza Age 5 to Adult     COLONOSCOPY        [unfilled]  Wt Readings from Last 3 Encounters:   18 145 lb (65.8 kg)   18 149 lb 8 oz (67.8 kg)   18 151 lb (68.5 kg)     Temp Readings from Last 3 Encounters:   18 97.4 °F (36.3 °C) (Temporal)   18 98 °F (36.7 °C) (Temporal)   18 97.8 °F (36.6 °C) (Oral)     BP Readings from Last 3 Encounters:   18 115/63   18 116/61   18 121/70     Pulse Readings from Last 3 Encounters:   18 65   18 68   18 86     Vitals:    18 0852   BP: 115/63   Pulse: 65   Resp: 18   Temp: 97.4 °F (36.3 °C)   TempSrc: Temporal   SpO2: 97%   Weight: 145 lb (65.8 kg) Height: 5' 4\" (1.626 m)   PainSc:   0 - No pain         Learning Assessment:  :     Learning Assessment 4/5/2018   PRIMARY LEARNER Patient   HIGHEST LEVEL OF EDUCATION - PRIMARY LEARNER  2 YEARS OF COLLEGE   BARRIERS PRIMARY LEARNER VISUAL   CO-LEARNER CAREGIVER No   PRIMARY LANGUAGE ENGLISH   LEARNER PREFERENCE PRIMARY READING   ANSWERED BY Patient   RELATIONSHIP SELF       Depression Screening:  :     PHQ over the last two weeks 6/14/2018   Little interest or pleasure in doing things Not at all   Feeling down, depressed, irritable, or hopeless Not at all   Total Score PHQ 2 0   Trouble falling or staying asleep, or sleeping too much -   Feeling tired or having little energy -   Poor appetite, weight loss, or overeating -   Feeling bad about yourself - or that you are a failure or have let yourself or your family down -   Trouble concentrating on things such as school, work, reading, or watching TV -   Moving or speaking so slowly that other people could have noticed; or the opposite being so fidgety that others notice -   Thoughts of being better off dead, or hurting yourself in some way -   PHQ 9 Score -       Fall Risk Assessment:  :     Fall Risk Assessment, last 12 mths 5/11/2018   Able to walk? Yes   Fall in past 12 months? No       Abuse Screening:  :     Abuse Screening Questionnaire 5/11/2018 12/12/2017 7/31/2014   Do you ever feel afraid of your partner? N N N   Are you in a relationship with someone who physically or mentally threatens you? N N N   Is it safe for you to go home?  Y Y Y       ADL Screening:  :     ADL Assessment 4/5/2018   Feeding yourself No Help Needed   Getting from bed to chair No Help Needed   Getting dressed No Help Needed   Bathing or showering No Help Needed   Walk across the room (includes cane/walker) No Help Needed   Using the telphone No Help Needed   Taking your medications No Help Needed   Preparing meals No Help Needed   Managing money (expenses/bills) No Help Needed Moderately strenuous housework (laundry) No Help Needed   Shopping for personal items (toiletries/medicines) No Help Needed   Shopping for groceries No Help Needed   Driving No Help Needed   Climbing a flight of stairs No Help Needed   Getting to places beyond walking distances No Help Needed         Medication reconciliation up to date and corrected with patient at this time.

## 2019-02-28 ENCOUNTER — OFFICE VISIT (OUTPATIENT)
Dept: ONCOLOGY | Age: 74
End: 2019-02-28

## 2019-02-28 ENCOUNTER — HOSPITAL ENCOUNTER (OUTPATIENT)
Dept: LAB | Age: 74
Discharge: HOME | End: 2019-02-28
Payer: MEDICARE

## 2019-02-28 VITALS
TEMPERATURE: 97.1 F | DIASTOLIC BLOOD PRESSURE: 70 MMHG | HEART RATE: 76 BPM | OXYGEN SATURATION: 98 % | SYSTOLIC BLOOD PRESSURE: 143 MMHG | BODY MASS INDEX: 25.61 KG/M2 | HEIGHT: 64 IN | RESPIRATION RATE: 20 BRPM | WEIGHT: 150 LBS

## 2019-02-28 DIAGNOSIS — D75.839 THROMBOCYTOSIS: Primary | ICD-10-CM

## 2019-02-28 PROCEDURE — 82668 ASSAY OF ERYTHROPOIETIN: CPT

## 2019-02-28 PROCEDURE — 83615 LACTATE (LD) (LDH) ENZYME: CPT

## 2019-02-28 PROCEDURE — 81270 JAK2 GENE: CPT

## 2019-02-28 PROCEDURE — 85025 COMPLETE CBC W/AUTO DIFF WBC: CPT

## 2019-02-28 PROCEDURE — 85651 RBC SED RATE NONAUTOMATED: CPT

## 2019-02-28 PROCEDURE — 36415 COLL VENOUS BLD VENIPUNCTURE: CPT

## 2019-02-28 PROCEDURE — 83690 ASSAY OF LIPASE: CPT

## 2019-02-28 PROCEDURE — 86140 C-REACTIVE PROTEIN: CPT

## 2019-02-28 RX ORDER — LEVOTHYROXINE SODIUM 125 UG/1
135 TABLET ORAL
COMMUNITY

## 2019-02-28 RX ORDER — LISINOPRIL 10 MG/1
TABLET ORAL DAILY
COMMUNITY
End: 2021-06-29 | Stop reason: ALTCHOICE

## 2019-02-28 NOTE — PROGRESS NOTES
[facility] at [address], [address] [place] [address] W: [phone]  F: [phone] Reason for Visit:  
Natacha Mendoza is a 74 y.o. female who is seen in consultation at the request of Dr. Leelee Romo for evaluation of thrombocytosis. History of Present Illness:  
Natacha Mendoza is a pleasant 74 y.o. female who presents today for evaluation of thrombocytosis. She has a history of gastric bypass surgery in 2012, has been taking vitamins since then, but no iron. She had labs with her PCP last year which noted high platelets. This was confirmed on repeat testing this month, and she has been referred for further evaluation. She reports feeling well. No rash. No fevers, chills, night sweats, unintentional weight loss, adenopathy. However, she does have hot flashes, and they occasionally occur at night with associated sweats. She broke her arm in December after a fall, no surgery performed, has had some increasing fatigue since then. She notes a history of a rectocele, due for repeat colonoscopy with Dr. Rebecca dinero. Her mother had polycythemia vera, required therapeutic phlebotomy. Past Medical History:  
Diagnosis Date  Allergic rhinitis 1/28/2009  Asthma 1/28/2009  Bariatric surgery status 10/16/2017  Dental disorder NEC  Elevated LFT's 1/28/2009  
 HTN 1/28/2009 CONTROLLED BY MEDS.  Hx of diabetes mellitus 6/14/2018  Hypothyroidism 1/28/2009  Left sided sciatica 1/28/2009  Macular degeneration 1/28/2009  Macular degeneration  Morbid obesity ([address]  Musculoskeletal disorder  Sleep apnea 1/28/2009 USES C-PAP  Thyroid disease HYPOTHYROIDISM Past Surgical History:  
Procedure Laterality Date  BREAST SURGERY PROCEDURE UNLISTED THINKS ITS RIGHT BREAST.  
 HX BREAST BIOPSY Bilateral 1997  
 neg./ ? laterality  HX GI    
 COLONOSCOPY X 1  
 HX GYN    
 TOTAL VAGINAL HYSTERECTOMY  HX GYN    
 EXPLOROTORY FOR ECTOPIC PREGNANCY (uterine horn) BTL., DNC  HX ORTHOPAEDIC    
 carpal tunnel release Social History Tobacco Use  Smoking status: Former Smoker Packs/day: 1.00 Years: 6.00 Pack years: 6.00 Last attempt to quit: 1969 Years since quittin.1  Smokeless tobacco: Never Used  Tobacco comment:  Substance Use Topics  Alcohol use: Yes Alcohol/week: 1.0 oz Types: 1 Glasses of wine, 1 Shots of liquor per week Comment: rare Family History Problem Relation Age of Onset  Macular Degen Mother  Hypertension Mother  Cancer Father   
     lung  Hypertension Father  Breast Cancer Sister 72  Hypertension Brother Current Outpatient Medications Medication Sig  levothyroxine (SYNTHROID) 125 mcg tablet Take  by mouth Daily (before breakfast).  lisinopril (PRINIVIL, ZESTRIL) 10 mg tablet Take  by mouth daily.  phentermine (ADIPEX-P) 37.5 mg tablet Take 1 Tab by mouth every morning. Max Daily Amount: 37.5 mg.  
 venlafaxine (EFFEXOR) 37.5 mg tablet Take 37.5 mg by mouth daily.  cyanocobalamin (VITAMIN B-12) 500 mcg tablet Take 500 mcg by mouth daily.  multivitamin (ONE A DAY) tablet Take 1 Tab by mouth daily.  Vit A,C,E-Zinc-Copper (PRESERVISION AREDS) Cap capsule Take 1 Cap by mouth two (2) times a day. BREAKFAST & BED TIME. No current facility-administered medications for this visit. Allergies Allergen Reactions  Cat Dander Shortness of Breath  Dog Dander Shortness of Breath  Mold Shortness of Breath  Other Plant, Animal, Environmental Shortness of Breath Dust  
  
 
Review of Systems: A complete review of systems was obtained, negative except as described above. Physical Exam:  
 
Visit Vitals /70 (BP 1 Location: Left arm, BP Patient Position: Sitting) Pulse 76 Temp 97.1 °F (36.2 °C) (Temporal) Resp 20 Ht 5' 4\" (1.626 m) Wt 150 lb (68 kg) SpO2 98% BMI 25.75 kg/m² General: No distress Eyes: PERRLA, anicteric sclerae HENT: Atraumatic, OP clear Neck: Supple Lymphatic: No cervical, supraclavicular, or inguinal adenopathy Respiratory: CTAB, normal respiratory effort CV: Normal rate, regular rhythm, no murmurs, no peripheral edema GI: Soft, nontender, nondistended, no masses, no hepatomegaly, no splenomegaly MS: Normal gait and station. Digits without clubbing or cyanosis. Skin: No rashes, ecchymoses, or petechiae. Normal temperature, turgor, and texture. Psych: Alert, oriented, appropriate affect, normal judgment/insight Results:  
 
Lab Results Component Value Date/Time WBC 5.9 06/14/2018 10:25 AM  
 HGB 13.9 06/14/2018 10:25 AM  
 HCT 41.6 06/14/2018 10:25 AM  
 PLATELET 068 (H) 73/66/0352 10:25 AM  
 MCV 94 06/14/2018 10:25 AM  
 ABS. NEUTROPHILS 3.1 06/14/2018 10:25 AM  
 
Lab Results Component Value Date/Time Sodium 143 06/14/2018 10:25 AM  
 Potassium 4.6 06/14/2018 10:25 AM  
 Chloride 101 06/14/2018 10:25 AM  
 CO2 29 06/14/2018 10:25 AM  
 Glucose 74 06/14/2018 10:25 AM  
 BUN 12 06/14/2018 10:25 AM  
 Creatinine 0.63 06/14/2018 10:25 AM  
 GFR est  06/14/2018 10:25 AM  
 GFR est non-AA 88 06/14/2018 10:25 AM  
 Calcium 9.4 06/14/2018 10:25 AM  
 Glucose (POC) 126 (H) 08/22/2012 06:54 AM  
 
Lab Results Component Value Date/Time Bilirubin, total 0.3 06/14/2018 10:25 AM  
 ALT (SGPT) 21 06/14/2018 10:25 AM  
 AST (SGOT) 24 06/14/2018 10:25 AM  
 Alk. phosphatase 73 06/14/2018 10:25 AM  
 Protein, total 6.5 06/14/2018 10:25 AM  
 Albumin 4.3 06/14/2018 10:25 AM  
 Globulin 3.3 08/02/2012 11:47 AM  
 
Lab Results Component Value Date/Time  Iron % saturation 29 06/14/2018 10:25 AM  
 TIBC 312 06/14/2018 10:25 AM  
 Ferritin 178 (H) 06/14/2018 10:25 AM  
 Vitamin B12 1,688 (H) 06/14/2018 10:25 AM  
 Folate 18.5 06/14/2018 10:25 AM  
  08/02/2012 11:47 AM  
 C-Reactive protein 0.50 08/02/2012 11:47 AM  
 TSH 0.758 06/14/2018 10:25 AM  
 TSH 3.02 08/02/2012 11:47 AM  
 
PLATELET Date Value 06/14/2018 458 x10E3/uL (H)  
08/21/2012 321 K/uL  
08/02/2012 298 K/uL  
 
 
9/20/2018 WBC: 6.1 HGB: 14.0 
PLT: 474 Smear: Platelets vary in size, otherwise unremarkable Creatinine: 0.68 AST, ALT, ALP, TBili: wnl Ferritin: 122 Iron saturation: 19% 2/20/2019 (from PCP) WBC: 7.3 HGB: 14.3 PLT: 498 
 
2/20/2019 (patient supplied) WBC: 7.8 HGB: 14.2 HCT: 43.7 PLT: 514 
B12: 1559 Folate: 16.7 Iron sat: 25% Ferritin: 125 Copper: 94 Zinc: 90 TSH: 1.62 Records reviewed and summarized above. Assessment:  
1) Thrombocytosis Present since 6/2018, normal in 2012. Uncertain etiology at this time. With her history of gastric bypass, iron deficiency is certainly a concern, however her recent iron levels appear normal.  Smear done 9/2018 was unremarkable. No obvious source of inflammation by history. A myleoproliferative neoplasm such as ET is possible, and she does have a significant family history (mother with PV). I will check some additional labs to further evaluate. Plan:  
 
· Labs today: CBC, ESR, CRP, LD, lipase, epo, JAK2 with reflex · Return to see me to review results I appreciate the opportunity to participate in Ms. Tania kolb.  
 
Signed By: Aditi Whitley MD

## 2019-03-04 LAB
BACKGROUND: 480503: ABNORMAL
BASOPHILS # BLD AUTO: 0.1 X10E3/UL (ref 0–0.2)
BASOPHILS NFR BLD AUTO: 1 %
COMMENT, 489419: ABNORMAL
CRP SERPL-MCNC: 1.1 MG/L (ref 0–4.9)
EOSINOPHIL # BLD AUTO: 0.2 X10E3/UL (ref 0–0.4)
EOSINOPHIL NFR BLD AUTO: 3 %
EPO SERPL-ACNC: 5.1 MIU/ML (ref 2.6–18.5)
ERYTHROCYTE [DISTWIDTH] IN BLOOD BY AUTOMATED COUNT: 14.9 % (ref 12.3–15.4)
ERYTHROCYTE [SEDIMENTATION RATE] IN BLOOD BY WESTERGREN METHOD: 3 MM/HR (ref 0–40)
EXTRACTION: ABNORMAL
HCT VFR BLD AUTO: 44.4 % (ref 34–46.6)
HGB BLD-MCNC: 14.7 G/DL (ref 11.1–15.9)
IMM GRANULOCYTES # BLD AUTO: 0 X10E3/UL (ref 0–0.1)
IMM GRANULOCYTES NFR BLD AUTO: 0 %
JAK2 P.V617F BLD/T QL: ABNORMAL
LAB DIRECTOR NAME PROVIDER: ABNORMAL
LDH SERPL-CCNC: 196 IU/L (ref 119–226)
LIPASE SERPL-CCNC: 92 U/L (ref 14–85)
LYMPHOCYTES # BLD AUTO: 2.9 X10E3/UL (ref 0.7–3.1)
LYMPHOCYTES NFR BLD AUTO: 41 %
MCH RBC QN AUTO: 31.1 PG (ref 26.6–33)
MCHC RBC AUTO-ENTMCNC: 33.1 G/DL (ref 31.5–35.7)
MCV RBC AUTO: 94 FL (ref 79–97)
MONOCYTES # BLD AUTO: 0.4 X10E3/UL (ref 0.1–0.9)
MONOCYTES NFR BLD AUTO: 6 %
NEUTROPHILS # BLD AUTO: 3.6 X10E3/UL (ref 1.4–7)
NEUTROPHILS NFR BLD AUTO: 49 %
PLATELET # BLD AUTO: 547 X10E3/UL (ref 150–379)
RBC # BLD AUTO: 4.73 X10E6/UL (ref 3.77–5.28)
WBC # BLD AUTO: 7.1 X10E3/UL (ref 3.4–10.8)

## 2019-03-21 ENCOUNTER — OFFICE VISIT (OUTPATIENT)
Dept: ONCOLOGY | Age: 74
End: 2019-03-21

## 2019-03-21 VITALS
SYSTOLIC BLOOD PRESSURE: 136 MMHG | WEIGHT: 143 LBS | DIASTOLIC BLOOD PRESSURE: 66 MMHG | TEMPERATURE: 97.2 F | OXYGEN SATURATION: 99 % | HEIGHT: 64 IN | BODY MASS INDEX: 24.41 KG/M2 | HEART RATE: 71 BPM | RESPIRATION RATE: 16 BRPM

## 2019-03-21 DIAGNOSIS — D47.3 ESSENTIAL THROMBOCYTOSIS (HCC): Primary | ICD-10-CM

## 2019-03-21 NOTE — PROGRESS NOTES
[facility] at [address] [place] Like: [phone]  F: [phone]      Reason for Visit:   Garnette Opitz is a 74 y.o. female who is seen in follow up of thrombocytosis. History of Present Illness:   Feeling well overall. Struggles with constipation. Had colonoscopy last week that was normal. No further follow up with GI expected. Appetite has been stable. No nausea or vomiting. No swelling of extremities. No SOB, chest pain, dizziness or headache. Her mother had polycythemia vera, required therapeutic phlebotomy. PAST HISTORY: The following sections were reviewed and updated in the EMR as appropriate: PMH, SH, FH, Medications, Allergies. Allergies   Allergen Reactions    Cat Dander Shortness of Breath    Dog Dander Shortness of Breath    Mold Shortness of Breath    Other Plant, Animal, Environmental Shortness of Breath     Dust      Review of Systems: A complete review of systems was obtained, reviewed, and scanned into the EMR. Pertinent findings reviewed above. Physical Exam:     Visit Vitals  /66 (BP 1 Location: Left arm, BP Patient Position: Sitting) Comment: . Pulse 71   Temp 97.2 °F (36.2 °C) (Temporal)   Resp 16   Ht 5' 4\" (1.626 m)   Wt 143 lb (64.9 kg)   SpO2 99%   BMI 24.55 kg/m²     General: No distress  Eyes: PERRLA, anicteric sclerae  HENT: Atraumatic, OP clear  Neck: Supple  Lymphatic: No cervical, supraclavicular, or inguinal adenopathy  Respiratory: CTAB, normal respiratory effort  CV: Normal rate, regular rhythm, no murmurs, no peripheral edema  GI: Soft, nontender, nondistended, no masses, no hepatomegaly, no splenomegaly  MS: Normal gait and station. Digits without clubbing or cyanosis. Skin: No rashes, ecchymoses, or petechiae. Normal temperature, turgor, and texture.   Psych: Alert, oriented, appropriate affect, normal judgment/insight    Results:     Lab Results   Component Value Date/Time    WBC 7.1 02/28/2019 12:54 PM    HGB 14.7 02/28/2019 12:54 PM    HCT 44.4 02/28/2019 12:54 PM    PLATELET 439 (H) 74/45/1863 12:54 PM    MCV 94 02/28/2019 12:54 PM    ABS. NEUTROPHILS 3.6 02/28/2019 12:54 PM     Lab Results   Component Value Date/Time    Sodium 143 06/14/2018 10:25 AM    Potassium 4.6 06/14/2018 10:25 AM    Chloride 101 06/14/2018 10:25 AM    CO2 29 06/14/2018 10:25 AM    Glucose 74 06/14/2018 10:25 AM    BUN 12 06/14/2018 10:25 AM    Creatinine 0.63 06/14/2018 10:25 AM    GFR est  06/14/2018 10:25 AM    GFR est non-AA 88 06/14/2018 10:25 AM    Calcium 9.4 06/14/2018 10:25 AM    Glucose (POC) 126 (H) 08/22/2012 06:54 AM     Lab Results   Component Value Date/Time    Bilirubin, total 0.3 06/14/2018 10:25 AM    ALT (SGPT) 21 06/14/2018 10:25 AM    AST (SGOT) 24 06/14/2018 10:25 AM    Alk.  phosphatase 73 06/14/2018 10:25 AM    Protein, total 6.5 06/14/2018 10:25 AM    Albumin 4.3 06/14/2018 10:25 AM    Globulin 3.3 08/02/2012 11:47 AM     Lab Results   Component Value Date/Time    Iron % saturation 29 06/14/2018 10:25 AM    TIBC 312 06/14/2018 10:25 AM    Ferritin 178 (H) 06/14/2018 10:25 AM    Vitamin B12 1,688 (H) 06/14/2018 10:25 AM    Folate 18.5 06/14/2018 10:25 AM    Erythropoietin 5.1 02/28/2019 12:54 PM     02/28/2019 12:54 PM    Sed rate (ESR) 3 02/28/2019 12:54 PM    C-Reactive protein 0.50 08/02/2012 11:47 AM    C-Reactive Protein, Qt 1.1 02/28/2019 12:54 PM    TSH 0.758 06/14/2018 10:25 AM    TSH 3.02 08/02/2012 11:47 AM    Lipase 92 (H) 02/28/2019 12:54 PM     PLATELET   Date Value   02/28/2019 547 x10E3/uL (H)   06/14/2018 458 x10E3/uL (H)   08/21/2012 321 K/uL   08/02/2012 298 K/uL     9/20/2018  WBC: 6.1  HGB: 14.0  PLT: 474  Smear: Platelets vary in size, otherwise unremarkable  Creatinine: 0.68  AST, ALT, ALP, TBili: wnl  Ferritin: 122  Iron saturation: 19%    2/20/2019 (from PCP)  WBC: 7.3  HGB: 14.3  PLT: 498    2/20/2019 (patient supplied)  WBC: 7.8  HGB: 14.2  HCT: 43.7  PLT: 514  B12: 1559  Folate: 16.7  Iron sat: 25%  Ferritin: 125  Copper: 94  Zinc: 90  TSH: 1.62    JAK2 2/28/2019: POSITIVE for the detection of the [id number] mutation. Erythropoietin 5.1        Assessment:   1) Essential Thrombocythemia  JAK2+  Laboratory testing has confirmed she is positive for the JAK2 mutation, which strongly supports a diagnosis of ET. Interestingly, her mother had PV, so there may be a familial component to her illness. I recommend a bone marrow biopsy to further evaluate. If we confirm the diagnosis, her IPSET-thrombosis score is high-risk based on age and JAK2 mutation (3.6%/year incidence of thrombosis). Median overall survival is 24.5 years. Recommended therapy would be hydroxyurea in order to reduce her platelet count into the normal range. Additionally recommend low-dose Aspirin of 81mg EC to reduce her risk of thrombotic complications. Plan:     · Bone marrow biopsy to confirm ET diagnosis  · ASA 81mg daily  · Return to see me to review results    Patient was seen in conjunction with Basilio Zelaya NP.     Signed By: Jayjay Broderick MD

## 2019-03-21 NOTE — PROGRESS NOTES
Eden Teran is a 74 y.o. female follow up for thrombocytosis. 1. Have you been to the ER, urgent care clinic since your last visit? Hospitalized since your last visit? No     2. Have you seen or consulted any other health care providers outside of the [address] [place] [address] since your last visit? Include any pap smears or colon screening. Yes colonoscopy with .

## 2019-03-28 ENCOUNTER — HOSPITAL ENCOUNTER (OUTPATIENT)
Dept: CT IMAGING | Age: 74
Discharge: HOME | End: 2019-03-28
Attending: NURSE PRACTITIONER
Payer: MEDICARE

## 2019-03-28 VITALS
OXYGEN SATURATION: 98 % | RESPIRATION RATE: 19 BRPM | WEIGHT: 143 LBS | HEART RATE: 77 BPM | DIASTOLIC BLOOD PRESSURE: 43 MMHG | SYSTOLIC BLOOD PRESSURE: 134 MMHG | BODY MASS INDEX: 24.41 KG/M2 | HEIGHT: 64 IN

## 2019-03-28 DIAGNOSIS — D47.3 ESSENTIAL THROMBOCYTOSIS (HCC): ICD-10-CM

## 2019-03-28 LAB
BASOPHILS # BLD: 0.1 K/UL (ref 0–0.1)
BASOPHILS NFR BLD: 1 % (ref 0–1)
DIFFERENTIAL METHOD BLD: ABNORMAL
EOSINOPHIL # BLD: 0.2 K/UL (ref 0–0.4)
EOSINOPHIL NFR BLD: 3 % (ref 0–7)
ERYTHROCYTE [DISTWIDTH] IN BLOOD BY AUTOMATED COUNT: 13.3 % (ref 11.5–14.5)
HCT VFR BLD AUTO: 45.4 % (ref 35–47)
HGB BLD-MCNC: 14.6 G/DL (ref 11.5–16)
IMM GRANULOCYTES # BLD AUTO: 0 K/UL (ref 0–0.04)
IMM GRANULOCYTES NFR BLD AUTO: 0 % (ref 0–0.5)
LYMPHOCYTES # BLD: 2 K/UL (ref 0.8–3.5)
LYMPHOCYTES NFR BLD: 31 % (ref 12–49)
MCH RBC QN AUTO: 30.9 PG (ref 26–34)
MCHC RBC AUTO-ENTMCNC: 32.2 G/DL (ref 30–36.5)
MCV RBC AUTO: 96.2 FL (ref 80–99)
MONOCYTES # BLD: 0.4 K/UL (ref 0–1)
MONOCYTES NFR BLD: 6 % (ref 5–13)
NEUTS SEG # BLD: 3.9 K/UL (ref 1.8–8)
NEUTS SEG NFR BLD: 59 % (ref 32–75)
NRBC # BLD: 0 K/UL (ref 0–0.01)
NRBC BLD-RTO: 0 PER 100 WBC
PLATELET # BLD AUTO: 491 K/UL (ref 150–400)
PMV BLD AUTO: 9.4 FL (ref 8.9–12.9)
RBC # BLD AUTO: 4.72 M/UL (ref 3.8–5.2)
WBC # BLD AUTO: 6.6 K/UL (ref 3.6–11)

## 2019-03-28 PROCEDURE — 88184 FLOWCYTOMETRY/ TC 1 MARKER: CPT

## 2019-03-28 PROCEDURE — 88311 DECALCIFY TISSUE: CPT

## 2019-03-28 PROCEDURE — 74011250636 HC RX REV CODE- 250/636: Performed by: RADIOLOGY

## 2019-03-28 PROCEDURE — 88342 IMHCHEM/IMCYTCHM 1ST ANTB: CPT

## 2019-03-28 PROCEDURE — 88313 SPECIAL STAINS GROUP 2: CPT

## 2019-03-28 PROCEDURE — 99152 MOD SED SAME PHYS/QHP 5/>YRS: CPT

## 2019-03-28 PROCEDURE — 38221 DX BONE MARROW BIOPSIES: CPT

## 2019-03-28 PROCEDURE — 85025 COMPLETE CBC W/AUTO DIFF WBC: CPT

## 2019-03-28 PROCEDURE — 36415 COLL VENOUS BLD VENIPUNCTURE: CPT

## 2019-03-28 PROCEDURE — 88264 CHROMOSOME ANALYSIS 20-25: CPT

## 2019-03-28 PROCEDURE — 88341 IMHCHEM/IMCYTCHM EA ADD ANTB: CPT

## 2019-03-28 PROCEDURE — 88237 TISSUE CULTURE BONE MARROW: CPT

## 2019-03-28 PROCEDURE — 88185 FLOWCYTOMETRY/TC ADD-ON: CPT

## 2019-03-28 PROCEDURE — 99153 MOD SED SAME PHYS/QHP EA: CPT

## 2019-03-28 PROCEDURE — 88305 TISSUE EXAM BY PATHOLOGIST: CPT

## 2019-03-28 PROCEDURE — 77030014115

## 2019-03-28 PROCEDURE — 77030028872 HC BN BIOP NDL ON CNTRL TY TELE -C

## 2019-03-28 RX ORDER — LIDOCAINE HYDROCHLORIDE 10 MG/ML
10 INJECTION, SOLUTION EPIDURAL; INFILTRATION; INTRACAUDAL; PERINEURAL
Status: COMPLETED | OUTPATIENT
Start: 2019-03-28 | End: 2019-03-28

## 2019-03-28 RX ORDER — MIDAZOLAM HYDROCHLORIDE 1 MG/ML
5 INJECTION, SOLUTION INTRAMUSCULAR; INTRAVENOUS
Status: DISCONTINUED | OUTPATIENT
Start: 2019-03-28 | End: 2019-03-28

## 2019-03-28 RX ORDER — FENTANYL CITRATE 50 UG/ML
100 INJECTION, SOLUTION INTRAMUSCULAR; INTRAVENOUS
Status: DISCONTINUED | OUTPATIENT
Start: 2019-03-28 | End: 2019-03-28

## 2019-03-28 RX ADMIN — FENTANYL CITRATE 25 MCG: 50 INJECTION, SOLUTION INTRAMUSCULAR; INTRAVENOUS at 10:17

## 2019-03-28 RX ADMIN — LIDOCAINE HYDROCHLORIDE 10 ML: 10 INJECTION, SOLUTION EPIDURAL; INFILTRATION; INTRACAUDAL; PERINEURAL at 10:03

## 2019-03-28 RX ADMIN — MIDAZOLAM 1 MG: 1 INJECTION INTRAMUSCULAR; INTRAVENOUS at 10:17

## 2019-03-28 RX ADMIN — FENTANYL CITRATE 25 MCG: 50 INJECTION, SOLUTION INTRAMUSCULAR; INTRAVENOUS at 10:05

## 2019-03-28 RX ADMIN — FENTANYL CITRATE 25 MCG: 50 INJECTION, SOLUTION INTRAMUSCULAR; INTRAVENOUS at 10:26

## 2019-03-28 RX ADMIN — MIDAZOLAM 1 MG: 1 INJECTION INTRAMUSCULAR; INTRAVENOUS at 10:05

## 2019-03-28 NOTE — DISCHARGE INSTRUCTIONS
Bone Marrow Aspiration and Biopsy: What to Expect at Home  Your Recovery  The biopsy site may feel sore for several days. It can help to walk, take pain medicine, and put ice packs on the site. You will probably be able to return to work and your usual activities the day after the procedure. Your doctor or nurse will call you with the results of your test.  This care sheet gives you a general idea about how long it will take for you to recover. But each person recovers at a different pace. Follow the steps below to get better as quickly as possible. How can you care for yourself at home? Activity    · Rest when you feel tired. Getting enough sleep will help you recover.     · You may drive when you are no longer taking pain pills and can quickly move your foot from the gas pedal to the brake. You must also be able to sit comfortably for a long period of time, even if you do not plan to go far. You might get caught in traffic.     · Most people are able to return to work the day after the procedure. Medicines    · Your doctor will tell you if and when you can restart your medicines. He or she will also give you instructions about taking any new medicines.     · If you take blood thinners, such as warfarin (Coumadin), clopidogrel (Plavix), or aspirin, be sure to talk to your doctor. He or she will tell you if and when to start taking those medicines again. Make sure that you understand exactly what your doctor wants you to do.     · Be safe with medicines. Take pain medicines exactly as directed. ? If the doctor gave you a prescription medicine for pain, take it as prescribed. ? If you are not taking a prescription pain medicine, take an over-the-counter medicine such as acetaminophen (Tylenol), ibuprofen (Advil, Motrin), or naproxen (Aleve). Read and follow all instructions on the label. ? Do not take two or more pain medicines at the same time unless the doctor told you to.  Many pain medicines have acetaminophen, which is Tylenol. Too much acetaminophen (Tylenol) can be harmful.     · If you think your pain medicine is making you sick to your stomach:  ? Take your medicine after meals (unless your doctor has told you not to). ? Ask your doctor for a different pain medicine.     · If your doctor prescribed antibiotics, take them as directed. Do not stop taking them just because you feel better.    Ice    · Put ice or a cold pack on the biopsy site for 10 to 20 minutes at a time. Put a thin cloth between the ice and your skin. Follow-up care is a key part of your treatment and safety. Be sure to make and go to all appointments, and call your doctor if you are having problems. It's also a good idea to know your test results and keep a list of the medicines you take. When should you call for help? Call 911 anytime you think you may need emergency care. For example, call if:    · You passed out (lost consciousness).    Call your doctor now or seek immediate medical care if:    · You have signs of infection, such as:  ? Increased pain, swelling, warmth, or redness. ? Red streaks leading from the biopsy site. ? Pus draining from the biopsy site. ? Swollen lymph nodes in your neck, armpits, or groin. ? A fever.    Watch closely for any changes in your health, and be sure to contact your doctor if:    · You are not getting better as expected. Where can you learn more? Go to http://dileep-tuyet.info/. Enter E148 in the search box to learn more about \"Bone Marrow Aspiration and Biopsy: What to Expect at Home. \"  Current as of: September 10, 2017  Content Version: 11.8  © 2318-7517 [employer]. Care instructions adapted under license by [employer] (which disclaims liability or warranty for this information).  If you have questions about a medical condition or this instruction, always ask your healthcare professional. Byrd Mcardle disclaims any warranty or liability for your use of this information. If you have any questions or concerns please call [facility] at [phone] between the hours of 7:00 am and 3:30 pm or after hours call [phone]. Damari HARE        Sedation for a Medical Procedure: After Your Visit  Instructions. Sedatives are used to relax you for a procedure. You may or may not be awake during the procedure. Common side effects of sedation medications include:                   Drowsiness, dizziness, euphoria, sleepiness or confusion. I              Unsteady gait. Loss of fine muscle control and delayed reaction time. Visual                     disturbances, difficulty focusing and blurred vision. Impaired memory recall. Follow-up care is a key component for your health and safety. Be sure to make and go to all medical appointments. Call your doctor if you are having problems. It's also a good idea to keep a list of your allergies, medicines with doses and test results on hand    Home care following your sedation procedure: You may experience some of these side effects or you may not be aware of subtle changes in your behavior or reaction time. Because you received these medications, we are giving you the following instructions: Activity   For your safety, you should not drive until the medicine wears off and you can think clearly and react easily. Do not drive for 24 hours. Rest when you feel tired. Getting enough sleep will help you recover. Diet    You can eat your normal diet. If your stomach is upset, try bland, low-fat foods like plain rice, broiled chicken, toast, and yogurt. Drink plenty of fluids unless your doctor advised you to limit your fluids. Do not consume alcoholic beverages for 24 hours. Instructions  Do not make important personal, business, or legal decisions for 24 hours.   Move slowly and carefully, do not make sudden position changes. Be alert for dizziness or lightheadedness and move accordingly. Have a responsible person assist you. Do not drive for 24 hours. Do not operate equipment for 24 hours. [employer] mowers, power tools, kitchen appliances, etc.)    Discharge medications:  Resume prior to test medications as prescribed by your personal physician. If you have any questions or concerns call Radiology RN at [phone]  After hours call Radiology on-call at [phone]    Call [phone] any time you think you may need emergency care. For example:                Call if you passed out (lost consciousness). The medicine is not wearing off and you cannot think clearly. Watch closely for changes in your health, and be sure to contact your doctor if                  you have any problems. Where can you learn more? Go to PlaceBlogger.be   Enter G817 in the search box to learn more about \"Sedation for a Medical Procedure: After Your Visit. \"

## 2019-03-28 NOTE — H&P
Interventional Radiology History and Physical (Outpatient)    3/28/2019    Patient: Vanesa Rojo 74 y.o. female     Referring Physician:  Crystal Carlos NP    Chief Complaint: elevated plt count    History of Present Illness: needs bone marrow biopsy    History:  Past Medical History:   Diagnosis Date    Allergic rhinitis 2009    Asthma 2009    Bariatric surgery status 10/16/2017    Dental disorder NEC     Elevated LFT's 2009    HTN 2009    CONTROLLED BY MEDS.  Hx of diabetes mellitus 2018    Hypothyroidism 2009    Left sided sciatica 2009    Macular degeneration 2009    Macular degeneration     Morbid obesity ([address]     Musculoskeletal disorder     Sleep apnea 2009    USES C-PAP    Thyroid disease     HYPOTHYROIDISM     Family History   Problem Relation Age of Onset    Macular Degen Mother     Hypertension Mother     Cancer Father         lung    Hypertension Father     Breast Cancer Sister 72    Hypertension Brother      Social History     Socioeconomic History    Marital status:      Spouse name: Not on file    Number of children: Not on file    Years of education: Not on file    Highest education level: Not on file   Occupational History    Not on file   Social Needs    Financial resource strain: Not on file    Food insecurity:     Worry: Not on file     Inability: Not on file    Transportation needs:     Medical: Not on file     Non-medical: Not on file   Tobacco Use    Smoking status: Former Smoker     Packs/day: 1.00     Years: 6.00     Pack years: 6.00     Last attempt to quit: 1969     Years since quittin.1    Smokeless tobacco: Never Used    Tobacco comment: 1966   Substance and Sexual Activity    Alcohol use:  Yes     Alcohol/week: 1.0 oz     Types: 1 Glasses of wine, 1 Shots of liquor per week     Comment: rare    Drug use: No    Sexual activity: Never   Lifestyle    Physical activity:     Days per week: Not on file     Minutes per session: Not on file    Stress: Not on file   Relationships    Social connections:     Talks on phone: Not on file     Gets together: Not on file     Attends Bahai service: Not on file     Active member of club or organization: Not on file     Attends meetings of clubs or organizations: Not on file     Relationship status: Not on file    Intimate partner violence:     Fear of current or ex partner: Not on file     Emotionally abused: Not on file     Physically abused: Not on file     Forced sexual activity: Not on file   Other Topics Concern    Not on file   Social History Narrative    Not on file       Allergies: Allergies   Allergen Reactions    Cat Dander Shortness of Breath    Dog Dander Shortness of Breath    Mold Shortness of Breath    Other Plant, Animal, Environmental Shortness of Breath     Dust       Prior to Admission Medications:  Prior to Admission medications    Medication Sig Start Date End Date Taking? Authorizing Provider   levothyroxine (SYNTHROID) 125 mcg tablet Take  by mouth Daily (before breakfast). Yes Provider, Historical   lisinopril (PRINIVIL, ZESTRIL) 10 mg tablet Take  by mouth daily. Yes Provider, Historical   phentermine (ADIPEX-P) 37.5 mg tablet Take 1 Tab by mouth every morning. Max Daily Amount: 37.5 mg. 9/6/18  Yes Anabella Barriga NP   venlafaxine [facility]) 37.5 mg tablet Take 37.5 mg by mouth daily. Yes Provider, Historical   cyanocobalamin (VITAMIN B-12) 500 mcg tablet Take 500 mcg by mouth daily. Yes Provider, Historical   multivitamin (ONE A DAY) tablet Take 1 Tab by mouth daily. Yes Provider, Historical   Vit A,C,E-Zinc-Copper (PRESERVISION AREDS) Cap capsule Take 1 Cap by mouth two (2) times a day. BREAKFAST & BED TIME. Yes Provider, Historical       Physical Exam:    Blood pressure 174/65, pulse 64, resp.  rate 12, height 5' 4\" (1.626 m), weight 64.9 kg (143 lb), SpO2 100 %, not currently breastfeeding. General: alert, cooperative, no distress, appears stated age  Heart: normal S1 and S2  Lungs: clear to auscultation bilaterally    Plan of Care/Planned Procedure:  Risks, benefits, and alternatives reviewed with patient and she agrees to proceed with the procedure.      Melissa Fields MD

## 2019-03-28 NOTE — PROGRESS NOTES
9705  Pt brought back to [facility] for scheduled CT guided bone marrow biopsy. Confirmed NPO and ride. 0850 [address]. S1S2. Denies pain. IV placed and labs drawn and sent to lab.  0930 Dr Jim Fletcher in [facility] to discuss procedure and sedation plan with pt,  and RN. Allergies reviewed. Consent signed. 0950  Pt brought to CT for procedure. Time out performed at 1020. Procedure started at 1022 and ended at 1031. Pt received a total of 2mg of versed and 75mcg of fentanyl over the course of procedure. Pt tolerated procedure well. Denies pain. Dressing to procedure site CDI. VS monitored throughout procedure. 1040 Pt returned to [facility]. Given crackers and fluids. Pt denies pain. [id number] with  to let him know procedure compete and pt doing well.  brought back to [facility].    1115  Reviewed discharge instructions with patient and  . Opportunity given for questions. Pt verbalized understanding. Copy provided. 1130 IV removed. Pt dressed self. 1136 Escorted pt out to vehicle in w/c for discharge to home with .

## 2019-04-12 ENCOUNTER — OFFICE VISIT (OUTPATIENT)
Dept: ONCOLOGY | Age: 74
End: 2019-04-12

## 2019-04-12 VITALS
RESPIRATION RATE: 18 BRPM | OXYGEN SATURATION: 95 % | HEART RATE: 73 BPM | DIASTOLIC BLOOD PRESSURE: 67 MMHG | WEIGHT: 148 LBS | SYSTOLIC BLOOD PRESSURE: 138 MMHG | BODY MASS INDEX: 25.27 KG/M2 | TEMPERATURE: 97.5 F | HEIGHT: 64 IN

## 2019-04-12 DIAGNOSIS — D47.3 ESSENTIAL THROMBOCYTOSIS (HCC): Primary | ICD-10-CM

## 2019-04-12 RX ORDER — ASPIRIN 81 MG/1
TABLET ORAL DAILY
COMMUNITY
End: 2021-03-25

## 2019-04-12 RX ORDER — HYDROXYUREA 500 MG/1
500 CAPSULE ORAL DAILY
Qty: 30 CAP | Refills: 11 | Status: SHIPPED | OUTPATIENT
Start: 2019-04-12 | End: 2020-02-16 | Stop reason: SDUPTHER

## 2019-04-12 NOTE — PATIENT INSTRUCTIONS
Hydroxyurea (By mouth)   Hydroxyurea ([id number]-[id number]-EE-a)  Treats cancer. Also treats sickle cell anemia by decreasing the number of crises and the need for blood transfusions. Brand Name(s): Droxia, Hydrea   There may be other brand names for this medicine. When This Medicine Should Not Be Used: This medicine is not right for everyone. Do not use it if you had an allergic reaction to hydroxyurea, or if you are pregnant. How to Use This Medicine:   Capsule, Tablet  · Take your medicine as directed. Your dose may need to be changed several times to find what works best for you. · Do not touch this medicine with bare hands. ¨ Wear disposable gloves when you handle the capsules or medicine bottles. ¨ Wash your hands before and after you handle the capsules or bottles. ¨ If powder from the capsule gets into your eyes, rinse them with water or an isotonic eyewash for 15 minutes. ¨ Do not open the capsule. If powder from the capsule spills out, wipe it up immediately with a damp disposable towel. Place the towel in a plastic bag and throw it away. Wash the area thoroughly with detergent 3 times. · Read and follow the patient instructions that come with this medicine. Talk to your doctor or pharmacist if you have any questions. · Missed dose: Take a dose as soon as you remember. If it is almost time for your next dose, wait until then and take a regular dose. Do not take extra medicine to make up for a missed dose. · Store the medicine in a closed container at room temperature, away from heat, moisture, and direct light. Drugs and Foods to Avoid:   Ask your doctor or pharmacist before using any other medicine, including over-the-counter medicines, vitamins, and herbal products. · Some medicines can affect how this medicine works.  Tell your doctor if you are using any of the following:  ¨ Interferon  ¨ Cancer medicines and radiation treatment  ¨ HIV/AIDS medicine (including didanosine or stavudine)  · This medicine may interfere with vaccines. Ask your doctor before you get a flu shot or any other vaccines. Avoid live vaccines while using this medicine. Warnings While Using This Medicine:   · This medicine may cause birth defects if either partner is using it during conception or pregnancy. Tell your doctor right away if you or your partner becomes pregnant. ¨ Women: Use birth control during treatment and for at least 6 months after treatment ends. ¨ Men: Use birth control during treatment and for at least 1 year after treatment ends. · Do not breastfeed while using this medicine. · Tell your doctor if you have kidney disease, liver disease, bone marrow problems, or HIV infection. · Medicines used to treat cancer are very strong and can have many side effects. Before receiving this medicine, make sure you understand all the risks and benefits. It is important for you to work closely with your doctor during your treatment. · This medicine may cause the following problems:  ¨ Increased risk of leukemia or skin cancer. Protect your skin from sunlight  ¨ Severe skin ulcers  · This medicine may make you bleed, bruise, or get infections more easily. Take precautions to prevent illness and injury. Wash your hands often. · Talk with your doctor before using this medicine if you plan to have children. Some men who use this medicine have become infertile (unable to have children). · Tell any doctor or dentist who treats you that you are using this medicine. This medicine may affect certain medical test results. · Your doctor will do lab tests at regular visits to check on the effects of this medicine. Keep all appointments. · Keep all medicine out of the reach of children. Never share your medicine with anyone.   Possible Side Effects While Using This Medicine:   Call your doctor right away if you notice any of these side effects:  · Allergic reaction: Itching or hives, swelling in your face or hands, swelling or tingling in your mouth or throat, chest tightness, trouble breathing  · Fever, chills, cough, sore throat, body aches  · Skin sores or ulcers, red or purple bumps  · Unusual bleeding, bruising, or weakness  If you notice other side effects that you think are caused by this medicine, tell your doctor. Call your doctor for medical advice about side effects. You may report side effects to FDA at 0-101-FTM-4060  © 2017 [facility] Information is for End User's use only and may not be sold, redistributed or otherwise used for commercial purposes. The above information is an  only. It is not intended as medical advice for individual conditions or treatments. Talk to your doctor, nurse or pharmacist before following any medical regimen to see if it is safe and effective for you.

## 2019-04-12 NOTE — PROGRESS NOTES
[facility] [place] at [address], [address] [place]: [phone]  F: [phone]      Reason for Visit:   Lucero Sylvester is a 66 y.o. female who is seen in follow up of thrombocytosis. Treatment History:   · Presented with thrombocytosis  · JAK2 V617 mutation positive on 2/28/2019  · Bone marrow biopsy 4/9/2019: JAK2 positive myeloproliferative neoplasm, most consistent with essential thrombocythemia. · Essential Thrombocythemia    History of Present Illness:   Feeling well. No new concerns. Energy has been good. Started aspirin therapy without issue. No chest pain, dizziness or headache. Appetite has been stable. Denies nausea, vomiting or diarrhea. Her mother had polycythemia vera, required therapeutic phlebotomy. PAST HISTORY: The following sections were reviewed and updated in the EMR as appropriate: PMH, SH, FH, Medications, Allergies. Allergies   Allergen Reactions    Cat Dander Shortness of Breath    Dog Dander Shortness of Breath    Mold Shortness of Breath    Other Plant, Animal, Environmental Shortness of Breath     Dust      Review of Systems: A complete review of systems was obtained, reviewed, and scanned into the EMR. Pertinent findings reviewed above. Physical Exam:     Visit Vitals  /67 (BP 1 Location: Left arm, BP Patient Position: Sitting)   Pulse 73   Temp 97.5 °F (36.4 °C) (Temporal)   Resp 18   Ht 5' 4\" (1.626 m)   Wt 148 lb (67.1 kg)   SpO2 95%   BMI 25.40 kg/m²     General: No distress  Eyes: PERRLA, anicteric sclerae  HENT: Atraumatic, OP clear  Neck: Supple  Lymphatic: No cervical, supraclavicular, or inguinal adenopathy  Respiratory: CTAB, normal respiratory effort  CV: Normal rate, regular rhythm, no murmurs, no peripheral edema  GI: Soft, nontender, nondistended, no masses, no hepatomegaly, no splenomegaly  MS: Normal gait and station. Digits without clubbing or cyanosis.   Skin: No rashes, ecchymoses, or petechiae. Normal temperature, turgor, and texture. Psych: Alert, oriented, appropriate affect, normal judgment/insight    Results:     Lab Results   Component Value Date/Time    WBC 6.6 03/28/2019 08:53 AM    HGB 14.6 03/28/2019 08:53 AM    HCT 45.4 03/28/2019 08:53 AM    PLATELET 742 (H) 11/81/4972 08:53 AM    MCV 96.2 03/28/2019 08:53 AM    ABS. NEUTROPHILS 3.9 03/28/2019 08:53 AM     Lab Results   Component Value Date/Time    Sodium 143 06/14/2018 10:25 AM    Potassium 4.6 06/14/2018 10:25 AM    Chloride 101 06/14/2018 10:25 AM    CO2 29 06/14/2018 10:25 AM    Glucose 74 06/14/2018 10:25 AM    BUN 12 06/14/2018 10:25 AM    Creatinine 0.63 06/14/2018 10:25 AM    GFR est  06/14/2018 10:25 AM    GFR est non-AA 88 06/14/2018 10:25 AM    Calcium 9.4 06/14/2018 10:25 AM    Glucose (POC) 126 (H) 08/22/2012 06:54 AM     Lab Results   Component Value Date/Time    Bilirubin, total 0.3 06/14/2018 10:25 AM    ALT (SGPT) 21 06/14/2018 10:25 AM    AST (SGOT) 24 06/14/2018 10:25 AM    Alk.  phosphatase 73 06/14/2018 10:25 AM    Protein, total 6.5 06/14/2018 10:25 AM    Albumin 4.3 06/14/2018 10:25 AM    Globulin 3.3 08/02/2012 11:47 AM     Lab Results   Component Value Date/Time    Iron % saturation 29 06/14/2018 10:25 AM    TIBC 312 06/14/2018 10:25 AM    Ferritin 178 (H) 06/14/2018 10:25 AM    Vitamin B12 1,688 (H) 06/14/2018 10:25 AM    Folate 18.5 06/14/2018 10:25 AM    Erythropoietin 5.1 02/28/2019 12:54 PM     02/28/2019 12:54 PM    Sed rate (ESR) 3 02/28/2019 12:54 PM    C-Reactive protein 0.50 08/02/2012 11:47 AM    C-Reactive Protein, Qt 1.1 02/28/2019 12:54 PM    TSH 0.758 06/14/2018 10:25 AM    TSH 3.02 08/02/2012 11:47 AM    Lipase 92 (H) 02/28/2019 12:54 PM     PLATELET   Date Value   03/28/2019 491 K/uL (H)   02/28/2019 547 x10E3/uL (H)   06/14/2018 458 x10E3/uL (H)   08/21/2012 321 K/uL   08/02/2012 298 K/uL     9/20/2018  WBC: 6.1  HGB: 14.0  PLT: 474  Smear: Platelets vary in size, otherwise unremarkable  Creatinine: 0.68  AST, ALT, ALP, TBili: wnl  Ferritin: 122  Iron saturation: 19%    2/20/2019 (from PCP)  WBC: 7.3  HGB: 14.3  PLT: 498    2/20/2019 (patient supplied)  WBC: 7.8  HGB: 14.2  HCT: 43.7  PLT: 514  B12: 1559  Folate: 16.7  Iron sat: 25%  Ferritin: 125  Copper: 94  Zinc: 90  TSH: 1.62    JAK2 2/28/2019: POSITIVE for the detection of the V617F mutation. Erythropoietin 5.1      Bone marrow biopsy 4/9/2019: estimated normocellular marrow for age with maturing trilineage hematopoiesis, mildly increased megakaryocytes and no increase in blasts. Preliminary comment:  Findings are of a JAK2 positive myeloproliferative neoplasm, most consistent with essential thrombocythemia. Assessment:   1) Essential Thrombocythemia  JAK2+  Bone marrow biopsy confirms the diagnosis. She has an IPSET prognosis score of 2, which is intermediate risk and translates to a median overall survival of 17 years to 24.5 years, depending on the study. Her IPSET thrombosis score is high risk based on age and JAK2 mutation, with a 3.6%/year risk of thrombosis. My recommendation is to initiate therapy with hydroxyurea with the goal of getting her platelet count into the normal range to reduce her risk of thrombosis. We will start with 500mg daily and adjust as needed. She should continue ASA 81mg PO daily to reduce her risk of thrombotic complications. Interestingly, her mother had PV, so there may be a familial component to her illness. We will monitor her labs monthly to start. Once on a stable dose, we can reduce to every 3 months. Plan:     · Start Hydroxyurea 500mg daily  · CBC and CMP in 4 weeks  · ASA 81mg daily  · Follow up in 4 weeks with labs prior    Patient was seen in conjunction with Randa Rojas NP.    >50% of this 40 minute visit was spent on counseling/coordination of care regarding the above diagnoses.       Signed By: Nash Witt MD

## 2019-04-12 NOTE — PROGRESS NOTES
Lu Nava is a 66 y.o. female    Chief Complaint   Patient presents with    Thrombocytosis     1. Have you been to the ER, urgent care clinic since your last visit? Hospitalized since your last visit? No    2. Have you seen or consulted any other health care providers outside of the [facility] since your last visit? Include any pap smears or colon screening.  No    Visit Vitals  /67 (BP 1 Location: Left arm, BP Patient Position: Sitting)   Pulse 73   Temp 97.5 °F (36.4 °C) (Temporal)   Resp 18   Ht 5' 4\" (1.626 m)   Wt 148 lb (67.1 kg)   SpO2 95%   BMI 25.40 kg/m²

## 2019-05-06 ENCOUNTER — TELEPHONE (OUTPATIENT)
Dept: ONCOLOGY | Age: 74
End: 2019-05-06

## 2019-05-06 NOTE — TELEPHONE ENCOUNTER
[address] at [facility]  [phone]    05/06/19- Phone call placed to pt to remind pt to have labs drawn prior to her follow up appointment with .  left for patient.

## 2019-05-09 ENCOUNTER — HOSPITAL ENCOUNTER (OUTPATIENT)
Dept: LAB | Age: 74
Discharge: HOME | End: 2019-05-09
Payer: MEDICARE

## 2019-05-09 PROCEDURE — 36415 COLL VENOUS BLD VENIPUNCTURE: CPT

## 2019-05-09 PROCEDURE — 85025 COMPLETE CBC W/AUTO DIFF WBC: CPT

## 2019-05-09 PROCEDURE — 80053 COMPREHEN METABOLIC PANEL: CPT

## 2019-05-10 LAB
ALBUMIN SERPL-MCNC: 4.1 G/DL (ref 3.5–4.8)
ALBUMIN/GLOB SERPL: 1.6 {RATIO} (ref 1.2–2.2)
ALP SERPL-CCNC: 89 IU/L (ref 39–117)
ALT SERPL-CCNC: 28 IU/L (ref 0–32)
AST SERPL-CCNC: 30 IU/L (ref 0–40)
BASOPHILS # BLD AUTO: 0.1 X10E3/UL (ref 0–0.2)
BASOPHILS NFR BLD AUTO: 1 %
BILIRUB SERPL-MCNC: 0.4 MG/DL (ref 0–1.2)
BUN SERPL-MCNC: 23 MG/DL (ref 8–27)
BUN/CREAT SERPL: 29 (ref 12–28)
CALCIUM SERPL-MCNC: 9.1 MG/DL (ref 8.7–10.3)
CHLORIDE SERPL-SCNC: 101 MMOL/L (ref 96–106)
CO2 SERPL-SCNC: 28 MMOL/L (ref 20–29)
CREAT SERPL-MCNC: 0.79 MG/DL (ref 0.57–1)
EOSINOPHIL # BLD AUTO: 0.3 X10E3/UL (ref 0–0.4)
EOSINOPHIL NFR BLD AUTO: 4 %
ERYTHROCYTE [DISTWIDTH] IN BLOOD BY AUTOMATED COUNT: 17.6 % (ref 12.3–15.4)
GLOBULIN SER CALC-MCNC: 2.5 G/DL (ref 1.5–4.5)
GLUCOSE SERPL-MCNC: 69 MG/DL (ref 65–99)
HCT VFR BLD AUTO: 41.7 % (ref 34–46.6)
HGB BLD-MCNC: 13.8 G/DL (ref 11.1–15.9)
IMM GRANULOCYTES # BLD AUTO: 0 X10E3/UL (ref 0–0.1)
IMM GRANULOCYTES NFR BLD AUTO: 0 %
LYMPHOCYTES # BLD AUTO: 2.6 X10E3/UL (ref 0.7–3.1)
LYMPHOCYTES NFR BLD AUTO: 34 %
MCH RBC QN AUTO: 31.6 PG (ref 26.6–33)
MCHC RBC AUTO-ENTMCNC: 33.1 G/DL (ref 31.5–35.7)
MCV RBC AUTO: 95 FL (ref 79–97)
MONOCYTES # BLD AUTO: 0.5 X10E3/UL (ref 0.1–0.9)
MONOCYTES NFR BLD AUTO: 7 %
NEUTROPHILS # BLD AUTO: 4.2 X10E3/UL (ref 1.4–7)
NEUTROPHILS NFR BLD AUTO: 54 %
PLATELET # BLD AUTO: 480 X10E3/UL (ref 150–379)
POTASSIUM SERPL-SCNC: 4.2 MMOL/L (ref 3.5–5.2)
PROT SERPL-MCNC: 6.6 G/DL (ref 6–8.5)
RBC # BLD AUTO: 4.37 X10E6/UL (ref 3.77–5.28)
SODIUM SERPL-SCNC: 144 MMOL/L (ref 134–144)
WBC # BLD AUTO: 7.7 X10E3/UL (ref 3.4–10.8)

## 2019-05-14 ENCOUNTER — OFFICE VISIT (OUTPATIENT)
Dept: ONCOLOGY | Age: 74
End: 2019-05-14

## 2019-05-14 VITALS
BODY MASS INDEX: 26.15 KG/M2 | DIASTOLIC BLOOD PRESSURE: 77 MMHG | HEART RATE: 69 BPM | RESPIRATION RATE: 20 BRPM | OXYGEN SATURATION: 98 % | WEIGHT: 153.2 LBS | HEIGHT: 64 IN | SYSTOLIC BLOOD PRESSURE: 164 MMHG | TEMPERATURE: 96.7 F

## 2019-05-14 DIAGNOSIS — D47.3 ESSENTIAL THROMBOCYTOSIS (HCC): Primary | ICD-10-CM

## 2019-05-14 NOTE — PROGRESS NOTES
Rosa Medhat is a 66 y.o. female follow up for thrombocytosis. 1. Have you been to the ER, urgent care clinic since your last visit? Hospitalized since your last visit? No     2. Have you seen or consulted any other health care providers outside of the [address] [place] [address] since your last visit? Include any pap smears or colon screening.  No

## 2019-06-11 ENCOUNTER — HOSPITAL ENCOUNTER (OUTPATIENT)
Dept: LAB | Age: 74
Discharge: HOME | End: 2019-06-11
Payer: MEDICARE

## 2019-06-11 PROCEDURE — 80053 COMPREHEN METABOLIC PANEL: CPT

## 2019-06-11 PROCEDURE — 36415 COLL VENOUS BLD VENIPUNCTURE: CPT

## 2019-06-11 PROCEDURE — 85025 COMPLETE CBC W/AUTO DIFF WBC: CPT

## 2019-06-12 LAB
ALBUMIN SERPL-MCNC: 4.2 G/DL (ref 3.5–4.8)
ALBUMIN/GLOB SERPL: 1.8 {RATIO} (ref 1.2–2.2)
ALP SERPL-CCNC: 84 IU/L (ref 39–117)
ALT SERPL-CCNC: 31 IU/L (ref 0–32)
AST SERPL-CCNC: 35 IU/L (ref 0–40)
BASOPHILS # BLD AUTO: 0 X10E3/UL (ref 0–0.2)
BASOPHILS NFR BLD AUTO: 0 %
BILIRUB SERPL-MCNC: 0.3 MG/DL (ref 0–1.2)
BUN SERPL-MCNC: 26 MG/DL (ref 8–27)
BUN/CREAT SERPL: 36 (ref 12–28)
CALCIUM SERPL-MCNC: 9 MG/DL (ref 8.7–10.3)
CHLORIDE SERPL-SCNC: 102 MMOL/L (ref 96–106)
CO2 SERPL-SCNC: 25 MMOL/L (ref 20–29)
CREAT SERPL-MCNC: 0.72 MG/DL (ref 0.57–1)
EOSINOPHIL # BLD AUTO: 0.4 X10E3/UL (ref 0–0.4)
EOSINOPHIL NFR BLD AUTO: 4 %
ERYTHROCYTE [DISTWIDTH] IN BLOOD BY AUTOMATED COUNT: 18.7 % (ref 12.3–15.4)
GLOBULIN SER CALC-MCNC: 2.3 G/DL (ref 1.5–4.5)
GLUCOSE SERPL-MCNC: 83 MG/DL (ref 65–99)
HCT VFR BLD AUTO: 42.4 % (ref 34–46.6)
HGB BLD-MCNC: 14 G/DL (ref 11.1–15.9)
IMM GRANULOCYTES # BLD AUTO: 0 X10E3/UL (ref 0–0.1)
IMM GRANULOCYTES NFR BLD AUTO: 0 %
LYMPHOCYTES # BLD AUTO: 2.8 X10E3/UL (ref 0.7–3.1)
LYMPHOCYTES NFR BLD AUTO: 32 %
MCH RBC QN AUTO: 32.3 PG (ref 26.6–33)
MCHC RBC AUTO-ENTMCNC: 33 G/DL (ref 31.5–35.7)
MCV RBC AUTO: 98 FL (ref 79–97)
MONOCYTES # BLD AUTO: 0.6 X10E3/UL (ref 0.1–0.9)
MONOCYTES NFR BLD AUTO: 6 %
NEUTROPHILS # BLD AUTO: 4.9 X10E3/UL (ref 1.4–7)
NEUTROPHILS NFR BLD AUTO: 58 %
PLATELET # BLD AUTO: 465 X10E3/UL (ref 150–450)
POTASSIUM SERPL-SCNC: 4.7 MMOL/L (ref 3.5–5.2)
PROT SERPL-MCNC: 6.5 G/DL (ref 6–8.5)
RBC # BLD AUTO: 4.33 X10E6/UL (ref 3.77–5.28)
SODIUM SERPL-SCNC: 142 MMOL/L (ref 134–144)
WBC # BLD AUTO: 8.6 X10E3/UL (ref 3.4–10.8)

## 2019-07-11 ENCOUNTER — HOSPITAL ENCOUNTER (OUTPATIENT)
Dept: LAB | Age: 74
Discharge: HOME | End: 2019-07-11
Payer: MEDICARE

## 2019-07-11 PROCEDURE — 36415 COLL VENOUS BLD VENIPUNCTURE: CPT

## 2019-07-11 PROCEDURE — 80053 COMPREHEN METABOLIC PANEL: CPT

## 2019-07-11 PROCEDURE — 85025 COMPLETE CBC W/AUTO DIFF WBC: CPT

## 2019-07-12 LAB
ALBUMIN SERPL-MCNC: 3.8 G/DL (ref 3.5–4.8)
ALBUMIN/GLOB SERPL: 1.9 {RATIO} (ref 1.2–2.2)
ALP SERPL-CCNC: 72 IU/L (ref 39–117)
ALT SERPL-CCNC: 37 IU/L (ref 0–32)
AST SERPL-CCNC: 34 IU/L (ref 0–40)
BASOPHILS # BLD AUTO: 0.1 X10E3/UL (ref 0–0.2)
BASOPHILS NFR BLD AUTO: 1 %
BILIRUB SERPL-MCNC: 0.3 MG/DL (ref 0–1.2)
BUN SERPL-MCNC: 21 MG/DL (ref 8–27)
BUN/CREAT SERPL: 29 (ref 12–28)
CALCIUM SERPL-MCNC: 9.1 MG/DL (ref 8.7–10.3)
CHLORIDE SERPL-SCNC: 105 MMOL/L (ref 96–106)
CO2 SERPL-SCNC: 25 MMOL/L (ref 20–29)
CREAT SERPL-MCNC: 0.72 MG/DL (ref 0.57–1)
EOSINOPHIL # BLD AUTO: 0.2 X10E3/UL (ref 0–0.4)
EOSINOPHIL NFR BLD AUTO: 4 %
ERYTHROCYTE [DISTWIDTH] IN BLOOD BY AUTOMATED COUNT: 16.3 % (ref 12.3–15.4)
GLOBULIN SER CALC-MCNC: 2 G/DL (ref 1.5–4.5)
GLUCOSE SERPL-MCNC: 55 MG/DL (ref 65–99)
HCT VFR BLD AUTO: 40.4 % (ref 34–46.6)
HGB BLD-MCNC: 13.2 G/DL (ref 11.1–15.9)
IMM GRANULOCYTES # BLD AUTO: 0 X10E3/UL (ref 0–0.1)
IMM GRANULOCYTES NFR BLD AUTO: 0 %
LYMPHOCYTES # BLD AUTO: 2.1 X10E3/UL (ref 0.7–3.1)
LYMPHOCYTES NFR BLD AUTO: 32 %
MCH RBC QN AUTO: 32.9 PG (ref 26.6–33)
MCHC RBC AUTO-ENTMCNC: 32.7 G/DL (ref 31.5–35.7)
MCV RBC AUTO: 101 FL (ref 79–97)
MONOCYTES # BLD AUTO: 0.4 X10E3/UL (ref 0.1–0.9)
MONOCYTES NFR BLD AUTO: 6 %
NEUTROPHILS # BLD AUTO: 3.6 X10E3/UL (ref 1.4–7)
NEUTROPHILS NFR BLD AUTO: 57 %
PLATELET # BLD AUTO: 434 X10E3/UL (ref 150–450)
POTASSIUM SERPL-SCNC: 4.8 MMOL/L (ref 3.5–5.2)
PROT SERPL-MCNC: 5.8 G/DL (ref 6–8.5)
RBC # BLD AUTO: 4.01 X10E6/UL (ref 3.77–5.28)
SODIUM SERPL-SCNC: 146 MMOL/L (ref 134–144)
WBC # BLD AUTO: 6.4 X10E3/UL (ref 3.4–10.8)

## 2019-08-13 ENCOUNTER — HOSPITAL ENCOUNTER (OUTPATIENT)
Dept: LAB | Age: 74
Discharge: HOME | End: 2019-08-13
Payer: MEDICARE

## 2019-08-13 PROCEDURE — 80053 COMPREHEN METABOLIC PANEL: CPT

## 2019-08-13 PROCEDURE — 85025 COMPLETE CBC W/AUTO DIFF WBC: CPT

## 2019-08-13 PROCEDURE — 36415 COLL VENOUS BLD VENIPUNCTURE: CPT

## 2019-08-14 LAB
ALBUMIN SERPL-MCNC: 4 G/DL (ref 3.5–4.8)
ALBUMIN/GLOB SERPL: 1.6 {RATIO} (ref 1.2–2.2)
ALP SERPL-CCNC: 81 IU/L (ref 39–117)
ALT SERPL-CCNC: 29 IU/L (ref 0–32)
AST SERPL-CCNC: 31 IU/L (ref 0–40)
BASOPHILS # BLD AUTO: 0 X10E3/UL (ref 0–0.2)
BASOPHILS NFR BLD AUTO: 1 %
BILIRUB SERPL-MCNC: <0.2 MG/DL (ref 0–1.2)
BUN SERPL-MCNC: 25 MG/DL (ref 8–27)
BUN/CREAT SERPL: 30 (ref 12–28)
CALCIUM SERPL-MCNC: 9.3 MG/DL (ref 8.7–10.3)
CHLORIDE SERPL-SCNC: 100 MMOL/L (ref 96–106)
CO2 SERPL-SCNC: 27 MMOL/L (ref 20–29)
CREAT SERPL-MCNC: 0.84 MG/DL (ref 0.57–1)
EOSINOPHIL # BLD AUTO: 0.2 X10E3/UL (ref 0–0.4)
EOSINOPHIL NFR BLD AUTO: 3 %
ERYTHROCYTE [DISTWIDTH] IN BLOOD BY AUTOMATED COUNT: 15.7 % (ref 12.3–15.4)
GLOBULIN SER CALC-MCNC: 2.5 G/DL (ref 1.5–4.5)
GLUCOSE SERPL-MCNC: 80 MG/DL (ref 65–99)
HCT VFR BLD AUTO: 43.7 % (ref 34–46.6)
HGB BLD-MCNC: 14.2 G/DL (ref 11.1–15.9)
IMM GRANULOCYTES # BLD AUTO: 0 X10E3/UL (ref 0–0.1)
IMM GRANULOCYTES NFR BLD AUTO: 0 %
LYMPHOCYTES # BLD AUTO: 2.1 X10E3/UL (ref 0.7–3.1)
LYMPHOCYTES NFR BLD AUTO: 32 %
MCH RBC QN AUTO: 34.9 PG (ref 26.6–33)
MCHC RBC AUTO-ENTMCNC: 32.5 G/DL (ref 31.5–35.7)
MCV RBC AUTO: 107 FL (ref 79–97)
MONOCYTES # BLD AUTO: 0.4 X10E3/UL (ref 0.1–0.9)
MONOCYTES NFR BLD AUTO: 6 %
NEUTROPHILS # BLD AUTO: 3.9 X10E3/UL (ref 1.4–7)
NEUTROPHILS NFR BLD AUTO: 58 %
PLATELET # BLD AUTO: 434 X10E3/UL (ref 150–450)
POTASSIUM SERPL-SCNC: 5 MMOL/L (ref 3.5–5.2)
PROT SERPL-MCNC: 6.5 G/DL (ref 6–8.5)
RBC # BLD AUTO: 4.07 X10E6/UL (ref 3.77–5.28)
SODIUM SERPL-SCNC: 141 MMOL/L (ref 134–144)
WBC # BLD AUTO: 6.6 X10E3/UL (ref 3.4–10.8)

## 2019-08-14 NOTE — PROGRESS NOTES
[facility] [place] at [facility] [address]  [address] [facility], 2[address] [facility] 1007 [facility]  [facility]: [phone]  F: [phone]      Reason for Visit:   Eduard Maher is a 66 y.o. female who is seen in follow up of thrombocytosis. Treatment History:   · Presented with thrombocytosis  · JAK2 V617 mutation positive on 2/28/2019  · Bone marrow biopsy 4/9/2019: JAK2 positive myeloproliferative neoplasm, most consistent with essential thrombocythemia. · Essential Thrombocythemia  · Hydrea started 4/13/2019    History of Present Illness:   She remains on hydrea, compliant with therapy. Energy a bit lower, but staying very active. No recent infections. No fevers, chills, night sweats, unintentional weight loss, adenopathy. Frequent hot flashes after eating, chronic. Remains on ASA daily. No bleeding. No dyspnea. No leg swelling. She notes some left knee pain, progressive, seeing ortho for this. Her mother had polycythemia vera, required therapeutic phlebotomy. PAST HISTORY: The following sections were reviewed and updated in the EMR as appropriate: PMH, SH, FH, Medications, Allergies. Allergies   Allergen Reactions    Cat Dander Shortness of Breath    Dog Dander Shortness of Breath    Mold Shortness of Breath    Other Plant, Animal, Environmental Shortness of Breath     Dust      Review of Systems: A complete review of systems was obtained, reviewed, and scanned into the EMR. Pertinent findings reviewed above.       Physical Exam:     Visit Vitals  /62 (BP 1 Location: Left arm, BP Patient Position: Sitting)   Pulse 74   Temp 97.8 °F (36.6 °C) (Temporal)   Resp 16   Ht 5' 4\" (1.626 m)   Wt 155 lb 8 oz (70.5 kg)   SpO2 98%   BMI 26.69 kg/m²     General: No distress  Eyes: PERRLA, anicteric sclerae  HENT: Atraumatic, OP clear  Neck: Supple  Lymphatic: No cervical, supraclavicular, or inguinal adenopathy  Respiratory: CTAB, normal respiratory effort  CV: Normal rate, regular rhythm, no murmurs, no peripheral edema  GI: Soft, nontender, nondistended, no masses, no hepatomegaly, no splenomegaly  MS: Normal gait and station. Digits without clubbing or cyanosis. Skin: No rashes, ecchymoses, or petechiae. Normal temperature, turgor, and texture. Psych: Alert, oriented, appropriate affect, normal judgment/insight    Results:     Lab Results   Component Value Date/Time    WBC 6.6 08/13/2019 02:48 PM    HGB 14.2 08/13/2019 02:48 PM    HCT 43.7 08/13/2019 02:48 PM    PLATELET 124 20/00/6116 02:48 PM     (H) 08/13/2019 02:48 PM    ABS. NEUTROPHILS 3.9 08/13/2019 02:48 PM     Lab Results   Component Value Date/Time    Sodium 141 08/13/2019 02:48 PM    Potassium 5.0 08/13/2019 02:48 PM    Chloride 100 08/13/2019 02:48 PM    CO2 27 08/13/2019 02:48 PM    Glucose 80 08/13/2019 02:48 PM    BUN 25 08/13/2019 02:48 PM    Creatinine 0.84 08/13/2019 02:48 PM    GFR est AA 78 08/13/2019 02:48 PM    GFR est non-AA 68 08/13/2019 02:48 PM    Calcium 9.3 08/13/2019 02:48 PM    Glucose (POC) 126 (H) 08/22/2012 06:54 AM     Lab Results   Component Value Date/Time    Bilirubin, total <0.2 08/13/2019 02:48 PM    ALT (SGPT) 29 08/13/2019 02:48 PM    AST (SGOT) 31 08/13/2019 02:48 PM    Alk.  phosphatase 81 08/13/2019 02:48 PM    Protein, total 6.5 08/13/2019 02:48 PM    Albumin 4.0 08/13/2019 02:48 PM    Globulin 3.3 08/02/2012 11:47 AM     Lab Results   Component Value Date/Time    Iron % saturation 29 06/14/2018 10:25 AM    TIBC 312 06/14/2018 10:25 AM    Ferritin 178 (H) 06/14/2018 10:25 AM    Vitamin B12 1,688 (H) 06/14/2018 10:25 AM    Folate 18.5 06/14/2018 10:25 AM    Erythropoietin 5.1 02/28/2019 12:54 PM     02/28/2019 12:54 PM    Sed rate (ESR) 3 02/28/2019 12:54 PM    C-Reactive protein 0.50 08/02/2012 11:47 AM    C-Reactive Protein, Qt 1.1 02/28/2019 12:54 PM    TSH 0.758 06/14/2018 10:25 AM    TSH 3.02 08/02/2012 11:47 AM    Lipase 92 (H) 02/28/2019 12:54 PM     PLATELET   Date Value 08/13/2019 434 x10E3/uL   07/11/2019 434 x10E3/uL   06/11/2019 465 x10E3/uL (H)   05/09/2019 480 x10E3/uL (H)   03/28/2019 491 K/uL (H)   02/28/2019 547 x10E3/uL (H)   06/14/2018 458 x10E3/uL (H)   08/21/2012 321 K/uL   08/02/2012 298 K/uL       9/20/2018  WBC: 6.1  HGB: 14.0  PLT: 474  Smear: Platelets vary in size, otherwise unremarkable  Creatinine: 0.68  AST, ALT, ALP, TBili: wnl  Ferritin: 122  Iron saturation: 19%    2/20/2019 (from PCP)  WBC: 7.3  HGB: 14.3  PLT: 498    2/20/2019 (patient supplied)  WBC: 7.8  HGB: 14.2  HCT: 43.7  PLT: 514  B12: 1559  Folate: 16.7  Iron sat: 25%  Ferritin: 125  Copper: 94  Zinc: 90  TSH: 1.62    JAK2 2/28/2019: POSITIVE for the detection of the V617F mutation. Erythropoietin 5.1      Bone marrow biopsy 4/9/2019: estimated normocellular marrow for age with maturing trilineage hematopoiesis, mildly increased megakaryocytes and no increase in blasts. Preliminary comment:  Findings are of a JAK2 positive myeloproliferative neoplasm, most consistent with essential thrombocythemia. Assessment:   1) Essential Thrombocythemia  JAK2+  Diagnosis confirmed on bone marrow biopsy. She has an IPSET prognosis score of 2, which is intermediate risk and translates to a median overall survival of 17 years to 24.5 years, depending on the study. Her IPSET thrombosis score is high risk based on age and JAK2 mutation, with a 3.6%/year risk of thrombosis. She began hydrea therapy 4/2019, with goal of reducing her platelets into the normal range to reduce her risk of thrombotic complications. She is tolerating hydrea well. PLT have come down into the normal range, and MCV has risen appropriately now. I will reduce to every 3 month labwork now. She should continue ASA 81mg PO daily to reduce her risk of thrombotic complications. Interestingly, her mother had PV, so there may be a familial component to her illness. 2) Hot flashes  Uncertain etiology.   Odd that they are related to eating. Monitor.     3) Left knee pain  Planning injection with orthopedics soon    Plan:     · Continue Hydroxyurea 500mg daily  · Continue ASA 81mg daily  · Labs in 3 months: CBC and CMP  · Return to see me in 3 months      Signed By: Gracy Godinez MD

## 2019-08-16 ENCOUNTER — OFFICE VISIT (OUTPATIENT)
Dept: ONCOLOGY | Age: 74
End: 2019-08-16

## 2019-08-16 VITALS
TEMPERATURE: 97.8 F | WEIGHT: 155.5 LBS | SYSTOLIC BLOOD PRESSURE: 133 MMHG | DIASTOLIC BLOOD PRESSURE: 62 MMHG | RESPIRATION RATE: 16 BRPM | OXYGEN SATURATION: 98 % | HEART RATE: 74 BPM | BODY MASS INDEX: 26.55 KG/M2 | HEIGHT: 64 IN

## 2019-08-16 DIAGNOSIS — D47.3 ESSENTIAL THROMBOCYTOSIS (HCC): Primary | ICD-10-CM

## 2019-08-16 NOTE — PROGRESS NOTES
Jocelynn Husbands is a 66 y.o. female follow up for thrombocytosis. 1. Have you been to the ER, urgent care clinic since your last visit? Hospitalized since your last visit?no     2. Have you seen or consulted any other health care providers outside of the [address] [place] [address] since your last visit? Include any pap smears or colon screening.  no

## 2019-11-14 ENCOUNTER — HOSPITAL ENCOUNTER (OUTPATIENT)
Dept: LAB | Age: 74
Discharge: HOME | End: 2019-11-14
Payer: MEDICARE

## 2019-11-14 PROCEDURE — 80053 COMPREHEN METABOLIC PANEL: CPT

## 2019-11-14 PROCEDURE — 36415 COLL VENOUS BLD VENIPUNCTURE: CPT

## 2019-11-14 PROCEDURE — 85025 COMPLETE CBC W/AUTO DIFF WBC: CPT

## 2019-11-15 LAB
ALBUMIN SERPL-MCNC: 4.3 G/DL (ref 3.5–4.8)
ALBUMIN/GLOB SERPL: 1.7 {RATIO} (ref 1.2–2.2)
ALP SERPL-CCNC: 84 IU/L (ref 39–117)
ALT SERPL-CCNC: 25 IU/L (ref 0–32)
AST SERPL-CCNC: 32 IU/L (ref 0–40)
BASOPHILS # BLD AUTO: 0.1 X10E3/UL (ref 0–0.2)
BASOPHILS NFR BLD AUTO: 1 %
BILIRUB SERPL-MCNC: 0.2 MG/DL (ref 0–1.2)
BUN SERPL-MCNC: 26 MG/DL (ref 8–27)
BUN/CREAT SERPL: 34 (ref 12–28)
CALCIUM SERPL-MCNC: 9 MG/DL (ref 8.7–10.3)
CHLORIDE SERPL-SCNC: 103 MMOL/L (ref 96–106)
CO2 SERPL-SCNC: 24 MMOL/L (ref 20–29)
CREAT SERPL-MCNC: 0.77 MG/DL (ref 0.57–1)
EOSINOPHIL # BLD AUTO: 0.2 X10E3/UL (ref 0–0.4)
EOSINOPHIL NFR BLD AUTO: 3 %
ERYTHROCYTE [DISTWIDTH] IN BLOOD BY AUTOMATED COUNT: 12.8 % (ref 12.3–15.4)
GLOBULIN SER CALC-MCNC: 2.5 G/DL (ref 1.5–4.5)
GLUCOSE SERPL-MCNC: 115 MG/DL (ref 65–99)
HCT VFR BLD AUTO: 41.7 % (ref 34–46.6)
HGB BLD-MCNC: 14.3 G/DL (ref 11.1–15.9)
IMM GRANULOCYTES # BLD AUTO: 0 X10E3/UL (ref 0–0.1)
IMM GRANULOCYTES NFR BLD AUTO: 0 %
LYMPHOCYTES # BLD AUTO: 2.4 X10E3/UL (ref 0.7–3.1)
LYMPHOCYTES NFR BLD AUTO: 33 %
MCH RBC QN AUTO: 34.3 PG (ref 26.6–33)
MCHC RBC AUTO-ENTMCNC: 34.3 G/DL (ref 31.5–35.7)
MCV RBC AUTO: 100 FL (ref 79–97)
MONOCYTES # BLD AUTO: 0.3 X10E3/UL (ref 0.1–0.9)
MONOCYTES NFR BLD AUTO: 5 %
NEUTROPHILS # BLD AUTO: 4.1 X10E3/UL (ref 1.4–7)
NEUTROPHILS NFR BLD AUTO: 58 %
PLATELET # BLD AUTO: 458 X10E3/UL (ref 150–450)
POTASSIUM SERPL-SCNC: 4.3 MMOL/L (ref 3.5–5.2)
PROT SERPL-MCNC: 6.8 G/DL (ref 6–8.5)
RBC # BLD AUTO: 4.17 X10E6/UL (ref 3.77–5.28)
SODIUM SERPL-SCNC: 144 MMOL/L (ref 134–144)
WBC # BLD AUTO: 7.1 X10E3/UL (ref 3.4–10.8)

## 2019-11-19 ENCOUNTER — OFFICE VISIT (OUTPATIENT)
Dept: ONCOLOGY | Age: 74
End: 2019-11-19

## 2019-11-19 VITALS
RESPIRATION RATE: 16 BRPM | HEART RATE: 69 BPM | TEMPERATURE: 97.5 F | OXYGEN SATURATION: 98 % | DIASTOLIC BLOOD PRESSURE: 78 MMHG | BODY MASS INDEX: 27.31 KG/M2 | HEIGHT: 64 IN | SYSTOLIC BLOOD PRESSURE: 127 MMHG | WEIGHT: 160 LBS

## 2019-11-19 DIAGNOSIS — D47.3 ESSENTIAL THROMBOCYTOSIS (HCC): Primary | ICD-10-CM

## 2019-11-19 NOTE — PROGRESS NOTES
[facility] [place] at [place] [address] [facility] [address] [facility]  Seda Yes: [phone]  F: [phone]      Reason for Visit:   Alexander Maxwell is a 66 y.o. female who is seen in follow up of thrombocytosis. Treatment History:   · Presented with thrombocytosis  · JAK2 V617 mutation positive on 2/28/2019  · Bone marrow biopsy 4/9/2019: JAK2 positive myeloproliferative neoplasm, most consistent with essential thrombocythemia. · Essential Thrombocythemia  · Hydrea started 4/13/2019    History of Present Illness:   Reports increase in stress and anxiety in the last few weeks. She has friend that is ill and required a lot of extra attention and care. She is not sleeping well due to anxiety. Seen by PCP and started increased dose of Effexor. Having new pain today over left chest/breast. Rates 2/10. She is able to reproduce pain when pressing over left sternum/left breast. Up to date on mammograms. Has not felt breast changes/lump. No recent fever or chills. No recent illness. Denies SOB, nausea, vomiting, diaphoresis, heart palpitations, headache, dizziness or general unwell feeling. No numbness or pain in left arm. Taking Hydrea as prescribed every morning. Taking with other medications. Taking within an hour of doses each day. Has noticed no side effects/symptoms of Hydrea since starting. Her mother had polycythemia vera, required therapeutic phlebotomy. PAST HISTORY: The following sections were reviewed and updated in the EMR as appropriate: PMH, SH, FH, Medications, Allergies. Allergies   Allergen Reactions    Cat Dander Shortness of Breath    Dog Dander Shortness of Breath    Mold Shortness of Breath    Other Plant, Animal, Environmental Shortness of Breath     Dust      Review of Systems: A complete review of systems was obtained, reviewed, and scanned into the EMR. Pertinent findings reviewed above.       Physical Exam:     Visit Vitals  /78 (BP 1 Location: Right arm, BP Patient Position: Sitting)   Pulse 69   Temp 97.5 °F (36.4 °C) (Temporal)   Resp 16   Ht 5' 4\" (1.626 m)   Wt 160 lb (72.6 kg)   SpO2 98%   BMI 27.46 kg/m²     General: No distress  Eyes: PERRLA, anicteric sclerae  HENT: Atraumatic, OP clear  Neck: Supple  Lymphatic: No cervical, supraclavicular, or inguinal adenopathy  Respiratory: CTAB, normal respiratory effort  CV: Normal rate, regular rhythm, no murmurs, no peripheral edema  GI: Soft, nontender, nondistended, no masses, no hepatomegaly, no splenomegaly  MS: Normal gait and station. Digits without clubbing or cyanosis. Tenderness to left of sternum over left breast  Skin: No rashes, ecchymoses, or petechiae. Normal temperature, turgor, and texture. Psych: Alert, oriented, appropriate affect, normal judgment/insight    Results:     Lab Results   Component Value Date/Time    WBC 7.1 11/14/2019 01:36 PM    HGB 14.3 11/14/2019 01:36 PM    HCT 41.7 11/14/2019 01:36 PM    PLATELET 821 (H) 90/10/9791 01:36 PM     (H) 11/14/2019 01:36 PM    ABS. NEUTROPHILS 4.1 11/14/2019 01:36 PM     Lab Results   Component Value Date/Time    Sodium 144 11/14/2019 01:36 PM    Potassium 4.3 11/14/2019 01:36 PM    Chloride 103 11/14/2019 01:36 PM    CO2 24 11/14/2019 01:36 PM    Glucose 115 (H) 11/14/2019 01:36 PM    BUN 26 11/14/2019 01:36 PM    Creatinine 0.77 11/14/2019 01:36 PM    GFR est AA 87 11/14/2019 01:36 PM    GFR est non-AA 75 11/14/2019 01:36 PM    Calcium 9.0 11/14/2019 01:36 PM    Glucose (POC) 126 (H) 08/22/2012 06:54 AM     Lab Results   Component Value Date/Time    Bilirubin, total 0.2 11/14/2019 01:36 PM    ALT (SGPT) 25 11/14/2019 01:36 PM    AST (SGOT) 32 11/14/2019 01:36 PM    Alk.  phosphatase 84 11/14/2019 01:36 PM    Protein, total 6.8 11/14/2019 01:36 PM    Albumin 4.3 11/14/2019 01:36 PM    Globulin 3.3 08/02/2012 11:47 AM     Lab Results   Component Value Date/Time    Iron % saturation 29 06/14/2018 10:25 AM    TIBC 312 06/14/2018 10:25 AM    Ferritin 178 (H) 06/14/2018 10:25 AM    Vitamin B12 1,688 (H) 06/14/2018 10:25 AM    Folate 18.5 06/14/2018 10:25 AM    Erythropoietin 5.1 02/28/2019 12:54 PM     02/28/2019 12:54 PM    Sed rate (ESR) 3 02/28/2019 12:54 PM    C-Reactive protein 0.50 08/02/2012 11:47 AM    C-Reactive Protein, Qt 1.1 02/28/2019 12:54 PM    TSH 0.758 06/14/2018 10:25 AM    TSH 3.02 08/02/2012 11:47 AM    Lipase 92 (H) 02/28/2019 12:54 PM     PLATELET   Date Value   11/14/2019 458 x10E3/uL (H)   08/13/2019 434 x10E3/uL   07/11/2019 434 x10E3/uL   06/11/2019 465 x10E3/uL (H)   05/09/2019 480 x10E3/uL (H)   03/28/2019 491 K/uL (H)   02/28/2019 547 x10E3/uL (H)   06/14/2018 458 x10E3/uL (H)   08/21/2012 321 K/uL   08/02/2012 298 K/uL       9/20/2018  WBC: 6.1  HGB: 14.0  PLT: 474  Smear: Platelets vary in size, otherwise unremarkable  Creatinine: 0.68  AST, ALT, ALP, TBili: wnl  Ferritin: 122  Iron saturation: 19%    2/20/2019 (from PCP)  WBC: 7.3  HGB: 14.3  PLT: 498    2/20/2019 (patient supplied)  WBC: 7.8  HGB: 14.2  HCT: 43.7  PLT: 514  B12: 1559  Folate: 16.7  Iron sat: 25%  Ferritin: 125  Copper: 94  Zinc: 90  TSH: 1.62    JAK2 2/28/2019: POSITIVE for the detection of the V617F mutation. Erythropoietin 5.1      Bone marrow biopsy 4/9/2019: estimated normocellular marrow for age with maturing trilineage hematopoiesis, mildly increased megakaryocytes and no increase in blasts. Preliminary comment:  Findings are of a JAK2 positive myeloproliferative neoplasm, most consistent with essential thrombocythemia. Assessment:   1) Essential Thrombocythemia  JAK2+  Diagnosis confirmed on bone marrow biopsy. She has an IPSET prognosis score of 2, which is intermediate risk and translates to a median overall survival of 17 years to 24.5 years, depending on the study. Her IPSET thrombosis score is high risk based on age and JAK2 mutation, with a 3.6%/year risk of thrombosis.   She began hydrea therapy 4/2019, with goal of reducing her platelets into the normal range to reduce her risk of thrombotic complications. She is tolerating hydrea well. MCV has risen appropriately now. Plt have come up a bit since last check. She is compliant with therapy and has not missed dosing. Taking at the same time everyday. Discussed option of dose increase to 1000mg Monday/Wed/Friday, then 500mg other days. Due to emotional stress and increased anxiety at present will hold on dose adjustment and repeat labs in 6 weeks. If continued elevation above normal range will increase dose at that time. She should continue ASA 81mg PO daily to reduce her risk of thrombotic complications. Interestingly, her mother had PV, so there may be a familial component to her illness. 2) Hot flashes  Uncertain etiology. Monitor. 3) Left knee pain  Following with orthopedics, improved with injections to knee    4) Left chest pain  Reproducible on exam. No associated symptoms (nausea, SOB, diaphoresis, pain or numbness to left arm or feeling of unease). May be related to anxiety as patient suspects. Okay to try OTC Tylenol or Ibuprofen. If persists or worsens she should f/u via phone call to our office or PCP. 5) Emotional Well Being  No psychosocial concerns identified today. Patient has adequate support. Plan:     · Continue Hydroxyurea 500mg daily  · Continue ASA 81mg daily  · Labs in 6 weeks: CBC with diff ([facility])  · Labs in 3 months: CBC and CMP ([facility])  · Return to see me in 3 months    Patient was seen in conjunction with Bertha Farris NP.     Signed By: Michele Conn MD

## 2019-11-19 NOTE — PROGRESS NOTES
Vlad Botello is a 66 y.o. female follow up for thrombocytosis. 1. Have you been to the ER, urgent care clinic since your last visit? Hospitalized since your last visit?no     2. Have you seen or consulted any other health care providers outside of the [address] [place] [address] since your last visit? Include any pap smears or colon screening.  no

## 2019-12-20 ENCOUNTER — HOSPITAL ENCOUNTER (OUTPATIENT)
Dept: LAB | Age: 74
Discharge: HOME | End: 2019-12-20

## 2019-12-20 DIAGNOSIS — D47.3 ESSENTIAL THROMBOCYTOSIS (HCC): ICD-10-CM

## 2019-12-20 LAB
BASOPHILS # BLD: 0.1 K/UL (ref 0–0.1)
BASOPHILS NFR BLD: 1 % (ref 0–1)
DIFFERENTIAL METHOD BLD: ABNORMAL
EOSINOPHIL # BLD: 0.2 K/UL (ref 0–0.4)
EOSINOPHIL NFR BLD: 2 % (ref 0–7)
ERYTHROCYTE [DISTWIDTH] IN BLOOD BY AUTOMATED COUNT: 13 % (ref 11.5–14.5)
HCT VFR BLD AUTO: 43.2 % (ref 35–47)
HGB BLD-MCNC: 14 G/DL (ref 11.5–16)
IMM GRANULOCYTES # BLD AUTO: 0 K/UL (ref 0–0.04)
IMM GRANULOCYTES NFR BLD AUTO: 0 % (ref 0–0.5)
LYMPHOCYTES # BLD: 2.7 K/UL (ref 0.8–3.5)
LYMPHOCYTES NFR BLD: 33 % (ref 12–49)
MCH RBC QN AUTO: 35.1 PG (ref 26–34)
MCHC RBC AUTO-ENTMCNC: 32.4 G/DL (ref 30–36.5)
MCV RBC AUTO: 108.3 FL (ref 80–99)
MONOCYTES # BLD: 0.5 K/UL (ref 0–1)
MONOCYTES NFR BLD: 6 % (ref 5–13)
NEUTS SEG # BLD: 4.8 K/UL (ref 1.8–8)
NEUTS SEG NFR BLD: 58 % (ref 32–75)
NRBC # BLD: 0 K/UL (ref 0–0.01)
NRBC BLD-RTO: 0 PER 100 WBC
PLATELET # BLD AUTO: 422 K/UL (ref 150–400)
PMV BLD AUTO: 9.8 FL (ref 8.9–12.9)
RBC # BLD AUTO: 3.99 M/UL (ref 3.8–5.2)
RBC MORPH BLD: ABNORMAL
WBC # BLD AUTO: 8.3 K/UL (ref 3.6–11)

## 2020-02-14 ENCOUNTER — HOSPITAL ENCOUNTER (OUTPATIENT)
Dept: LAB | Age: 75
Discharge: HOME | End: 2020-02-14

## 2020-02-14 DIAGNOSIS — D47.3 ESSENTIAL THROMBOCYTOSIS (HCC): ICD-10-CM

## 2020-02-14 LAB
ALBUMIN SERPL-MCNC: 3.6 G/DL (ref 3.5–5)
ALBUMIN/GLOB SERPL: 1.1 {RATIO} (ref 1.1–2.2)
ALP SERPL-CCNC: 81 U/L (ref 45–117)
ALT SERPL-CCNC: 32 U/L (ref 12–78)
ANION GAP SERPL CALC-SCNC: 1 MMOL/L (ref 5–15)
AST SERPL-CCNC: 23 U/L (ref 15–37)
BASOPHILS # BLD: 0.1 K/UL (ref 0–0.1)
BASOPHILS NFR BLD: 1 % (ref 0–1)
BILIRUB SERPL-MCNC: 0.3 MG/DL (ref 0.2–1)
BUN SERPL-MCNC: 22 MG/DL (ref 6–20)
BUN/CREAT SERPL: 30 (ref 12–20)
CALCIUM SERPL-MCNC: 8.5 MG/DL (ref 8.5–10.1)
CHLORIDE SERPL-SCNC: 109 MMOL/L (ref 97–108)
CO2 SERPL-SCNC: 31 MMOL/L (ref 21–32)
CREAT SERPL-MCNC: 0.74 MG/DL (ref 0.55–1.02)
DIFFERENTIAL METHOD BLD: ABNORMAL
EOSINOPHIL # BLD: 0.2 K/UL (ref 0–0.4)
EOSINOPHIL NFR BLD: 3 % (ref 0–7)
ERYTHROCYTE [DISTWIDTH] IN BLOOD BY AUTOMATED COUNT: 13.8 % (ref 11.5–14.5)
GLOBULIN SER CALC-MCNC: 3.2 G/DL (ref 2–4)
GLUCOSE SERPL-MCNC: 83 MG/DL (ref 65–100)
HCT VFR BLD AUTO: 42.8 % (ref 35–47)
HGB BLD-MCNC: 13.5 G/DL (ref 11.5–16)
IMM GRANULOCYTES # BLD AUTO: 0 K/UL (ref 0–0.04)
IMM GRANULOCYTES NFR BLD AUTO: 0 % (ref 0–0.5)
LYMPHOCYTES # BLD: 2.6 K/UL (ref 0.8–3.5)
LYMPHOCYTES NFR BLD: 32 % (ref 12–49)
MCH RBC QN AUTO: 34.8 PG (ref 26–34)
MCHC RBC AUTO-ENTMCNC: 31.5 G/DL (ref 30–36.5)
MCV RBC AUTO: 110.3 FL (ref 80–99)
MONOCYTES # BLD: 0.5 K/UL (ref 0–1)
MONOCYTES NFR BLD: 6 % (ref 5–13)
NEUTS SEG # BLD: 4.8 K/UL (ref 1.8–8)
NEUTS SEG NFR BLD: 58 % (ref 32–75)
NRBC # BLD: 0 K/UL (ref 0–0.01)
NRBC BLD-RTO: 0 PER 100 WBC
PLATELET # BLD AUTO: 432 K/UL (ref 150–400)
PMV BLD AUTO: 9.7 FL (ref 8.9–12.9)
POTASSIUM SERPL-SCNC: 4.3 MMOL/L (ref 3.5–5.1)
PROT SERPL-MCNC: 6.8 G/DL (ref 6.4–8.2)
RBC # BLD AUTO: 3.88 M/UL (ref 3.8–5.2)
RBC MORPH BLD: ABNORMAL
SODIUM SERPL-SCNC: 141 MMOL/L (ref 136–145)
WBC # BLD AUTO: 8.2 K/UL (ref 3.6–11)

## 2020-02-16 NOTE — PROGRESS NOTES
[facility] [place] at [address] [facility]  Valentino Carol: [phone]  F: [phone]      Reason for Visit:   Betsy Fuentes is a 66 y.o. female who is seen in follow up of essential thrombocythemia. Treatment History:   · Presented with thrombocytosis  · JAK2 V617 mutation positive on 2/28/2019  · Bone marrow biopsy 4/9/2019: JAK2 positive myeloproliferative neoplasm, most consistent with essential thrombocythemia. · Essential Thrombocythemia  · Hydrea started 4/13/2019    History of Present Illness:   She reports tolerating hydrea well, has not missed a single dose. Occasional mouth sores. She noted a cervical node that was tender, but this resolved. No more chest pain. Her mother had polycythemia vera, required therapeutic phlebotomy. PAST HISTORY: The following sections were reviewed and updated in the EMR as appropriate: PMH, SH, FH, Medications, Allergies. Allergies   Allergen Reactions    Cat Dander Shortness of Breath    Dog Dander Shortness of Breath    Mold Shortness of Breath    Other Plant, Animal, Environmental Shortness of Breath     Dust      Review of Systems: A complete review of systems was obtained, reviewed, and scanned into the EMR. Pertinent findings reviewed above. Physical Exam:     Visit Vitals  /68 (BP 1 Location: Left arm, BP Patient Position: Sitting)   Pulse 72   Temp 97.1 °F (36.2 °C) (Temporal)   Resp 14   Ht 5' 4\" (1.626 m)   Wt 164 lb (74.4 kg)   SpO2 98%   BMI 28.15 kg/m²     General: No distress  Eyes: PERRLA, anicteric sclerae  HENT: Atraumatic, OP clear  Neck: Supple  Lymphatic: No cervical, supraclavicular, or inguinal adenopathy  Respiratory: CTAB, normal respiratory effort  CV: Normal rate, regular rhythm, no murmurs, no peripheral edema  GI: Soft, nontender, nondistended, no masses, no hepatomegaly, no splenomegaly  MS: Normal gait and station. Digits without clubbing or cyanosis. Skin: No rashes, ecchymoses, or petechiae. Normal temperature, turgor, and texture. Psych: Alert, oriented, appropriate affect, normal judgment/insight    Results:     Lab Results   Component Value Date/Time    WBC 8.2 02/14/2020 02:25 PM    HGB 13.5 02/14/2020 02:25 PM    HCT 42.8 02/14/2020 02:25 PM    PLATELET 150 (H) 43/72/5656 02:25 PM    .3 (H) 02/14/2020 02:25 PM    ABS. NEUTROPHILS 4.8 02/14/2020 02:25 PM     Lab Results   Component Value Date/Time    Sodium 141 02/14/2020 02:25 PM    Potassium 4.3 02/14/2020 02:25 PM    Chloride 109 (H) 02/14/2020 02:25 PM    CO2 31 02/14/2020 02:25 PM    Glucose 83 02/14/2020 02:25 PM    BUN 22 (H) 02/14/2020 02:25 PM    Creatinine 0.74 02/14/2020 02:25 PM    GFR est AA >60 02/14/2020 02:25 PM    GFR est non-AA >60 02/14/2020 02:25 PM    Calcium 8.5 02/14/2020 02:25 PM    Glucose (POC) 126 (H) 08/22/2012 06:54 AM     Lab Results   Component Value Date/Time    Bilirubin, total 0.3 02/14/2020 02:25 PM    ALT (SGPT) 32 02/14/2020 02:25 PM    AST (SGOT) 23 02/14/2020 02:25 PM    Alk.  phosphatase 81 02/14/2020 02:25 PM    Protein, total 6.8 02/14/2020 02:25 PM    Albumin 3.6 02/14/2020 02:25 PM    Globulin 3.2 02/14/2020 02:25 PM     Lab Results   Component Value Date/Time    Iron % saturation 29 06/14/2018 10:25 AM    TIBC 312 06/14/2018 10:25 AM    Ferritin 178 (H) 06/14/2018 10:25 AM    Vitamin B12 1,688 (H) 06/14/2018 10:25 AM    Folate 18.5 06/14/2018 10:25 AM    Erythropoietin 5.1 02/28/2019 12:54 PM     02/28/2019 12:54 PM    Sed rate (ESR) 3 02/28/2019 12:54 PM    C-Reactive protein 0.50 08/02/2012 11:47 AM    C-Reactive Protein, Qt 1.1 02/28/2019 12:54 PM    TSH 0.758 06/14/2018 10:25 AM    TSH 3.02 08/02/2012 11:47 AM    Lipase 92 (H) 02/28/2019 12:54 PM     PLATELET   Date Value   02/14/2020 432 K/uL (H)   12/20/2019 422 K/uL (H)   11/14/2019 458 x10E3/uL (H)   08/13/2019 434 x10E3/uL   07/11/2019 434 x10E3/uL   06/11/2019 465 x10E3/uL (H) 05/09/2019 480 x10E3/uL (H)   03/28/2019 491 K/uL (H)   02/28/2019 547 x10E3/uL (H)   06/14/2018 458 x10E3/uL (H)   08/21/2012 321 K/uL   08/02/2012 298 K/uL       9/20/2018  WBC: 6.1  HGB: 14.0  PLT: 474  Smear: Platelets vary in size, otherwise unremarkable  Creatinine: 0.68  AST, ALT, ALP, TBili: wnl  Ferritin: 122  Iron saturation: 19%    2/20/2019 (from PCP)  WBC: 7.3  HGB: 14.3  PLT: 498    2/20/2019 (patient supplied)  WBC: 7.8  HGB: 14.2  HCT: 43.7  PLT: 514  B12: 1559  Folate: 16.7  Iron sat: 25%  Ferritin: 125  Copper: 94  Zinc: 90  TSH: 1.62    JAK2 2/28/2019: POSITIVE for the detection of the V617F mutation. Erythropoietin 5.1      Bone marrow biopsy 4/9/2019: estimated normocellular marrow for age with maturing trilineage hematopoiesis, mildly increased megakaryocytes and no increase in blasts. Preliminary comment:  Findings are of a JAK2 positive myeloproliferative neoplasm, most consistent with essential thrombocythemia. Assessment:   1) Essential Thrombocythemia  JAK2+  Diagnosis confirmed on bone marrow biopsy. She has an IPSET prognosis score of 2, which is intermediate risk and translates to a median overall survival of 17 years to 24.5 years, depending on the study. Her IPSET thrombosis score is high risk based on age and JAK2 mutation, with a 3.6%/year risk of thrombosis. She began hydrea therapy 4/2019, with goal of reducing her platelets into the normal range to reduce her risk of thrombotic complications. She is tolerating hydrea well, on 500mg PO daily. PLT have fallen <450. Continue therapy and monitor. She should continue ASA 81mg PO daily to reduce her risk of thrombotic complications. Interestingly, her mother had PV, so there may be a familial component to her illness. 2) Hot flashes  Chronic. Uncertain etiology. Stable. Monitor. 3) Left knee pain  Following with orthopedics, improved with injections to knee    4) Left chest pain  Resolved.     5) Emotional Well Being  No psychosocial concerns identified today. Patient has adequate support.      Plan:     · Continue Hydroxyurea 500mg daily  · Continue ASA 81mg daily  · Labs in 3 months: CBC and CMP (She prefers [employer])  · Return to see me in 3 months      Signed By: Tri Mahan MD

## 2020-02-18 ENCOUNTER — OFFICE VISIT (OUTPATIENT)
Dept: ONCOLOGY | Age: 75
End: 2020-02-18

## 2020-02-18 VITALS
HEART RATE: 72 BPM | BODY MASS INDEX: 28 KG/M2 | RESPIRATION RATE: 14 BRPM | HEIGHT: 64 IN | DIASTOLIC BLOOD PRESSURE: 68 MMHG | OXYGEN SATURATION: 98 % | WEIGHT: 164 LBS | SYSTOLIC BLOOD PRESSURE: 162 MMHG | TEMPERATURE: 97.1 F

## 2020-02-18 DIAGNOSIS — D47.3 ESSENTIAL THROMBOCYTOSIS (HCC): Primary | ICD-10-CM

## 2020-02-18 RX ORDER — HYDROXYUREA 500 MG/1
500 CAPSULE ORAL DAILY
Qty: 30 CAP | Refills: 11 | Status: SHIPPED | OUTPATIENT
Start: 2020-02-18 | End: 2021-02-18 | Stop reason: SDUPTHER

## 2020-02-18 NOTE — PROGRESS NOTES
Vanesa Rojo is a 66 y.o. female follow up for ET. 1. Have you been to the ER, urgent care clinic since your last visit? Hospitalized since your last visit?no     2. Have you seen or consulted any other health care providers outside of the [address] [place] [address] since your last visit? Include any pap smears or colon screening.  no

## 2020-05-15 LAB
ALBUMIN SERPL-MCNC: 4.5 G/DL (ref 3.7–4.7)
ALBUMIN/GLOB SERPL: 2 {RATIO} (ref 1.2–2.2)
ALP SERPL-CCNC: 86 IU/L (ref 39–117)
ALT SERPL-CCNC: 26 IU/L (ref 0–32)
AST SERPL-CCNC: 31 IU/L (ref 0–40)
BASOPHILS # BLD AUTO: 0.1 X10E3/UL (ref 0–0.2)
BASOPHILS NFR BLD AUTO: 1 %
BILIRUB SERPL-MCNC: 0.4 MG/DL (ref 0–1.2)
BUN SERPL-MCNC: 22 MG/DL (ref 8–27)
BUN/CREAT SERPL: 27 (ref 12–28)
CALCIUM SERPL-MCNC: 9.9 MG/DL (ref 8.7–10.3)
CHLORIDE SERPL-SCNC: 100 MMOL/L (ref 96–106)
CO2 SERPL-SCNC: 28 MMOL/L (ref 20–29)
CREAT SERPL-MCNC: 0.83 MG/DL (ref 0.57–1)
EOSINOPHIL # BLD AUTO: 0.2 X10E3/UL (ref 0–0.4)
EOSINOPHIL NFR BLD AUTO: 3 %
ERYTHROCYTE [DISTWIDTH] IN BLOOD BY AUTOMATED COUNT: 13.3 % (ref 11.7–15.4)
GLOBULIN SER CALC-MCNC: 2.3 G/DL (ref 1.5–4.5)
GLUCOSE SERPL-MCNC: 72 MG/DL (ref 65–99)
HCT VFR BLD AUTO: 43.5 % (ref 34–46.6)
HGB BLD-MCNC: 14.9 G/DL (ref 11.1–15.9)
IMM GRANULOCYTES # BLD AUTO: 0 X10E3/UL (ref 0–0.1)
IMM GRANULOCYTES NFR BLD AUTO: 0 %
LYMPHOCYTES # BLD AUTO: 2 X10E3/UL (ref 0.7–3.1)
LYMPHOCYTES NFR BLD AUTO: 33 %
MCH RBC QN AUTO: 34.6 PG (ref 26.6–33)
MCHC RBC AUTO-ENTMCNC: 34.3 G/DL (ref 31.5–35.7)
MCV RBC AUTO: 101 FL (ref 79–97)
MONOCYTES # BLD AUTO: 0.4 X10E3/UL (ref 0.1–0.9)
MONOCYTES NFR BLD AUTO: 7 %
NEUTROPHILS # BLD AUTO: 3.3 X10E3/UL (ref 1.4–7)
NEUTROPHILS NFR BLD AUTO: 56 %
PLATELET # BLD AUTO: 425 X10E3/UL (ref 150–450)
POTASSIUM SERPL-SCNC: 4.6 MMOL/L (ref 3.5–5.2)
PROT SERPL-MCNC: 6.8 G/DL (ref 6–8.5)
RBC # BLD AUTO: 4.31 X10E6/UL (ref 3.77–5.28)
SODIUM SERPL-SCNC: 143 MMOL/L (ref 134–144)
WBC # BLD AUTO: 5.9 X10E3/UL (ref 3.4–10.8)

## 2020-05-18 NOTE — PROGRESS NOTES
[facility] [place] at [address], [facility] [place]: [phone]  F: [phone]      Reason for Visit:   Gris Rod is a 66 y.o. female who is seen by synchronous (real-time) audio-video technology for follow up of essential thrombocythemia. Treatment History:   · Presented with thrombocytosis  · JAK2 V617 mutation positive on 2/28/2019  · Bone marrow biopsy 4/9/2019: JAK2 positive myeloproliferative neoplasm, most consistent with essential thrombocythemia. · Essential Thrombocythemia  · Hydrea started 4/13/2019    History of Present Illness:   She remains on hydrea 500mg PO daily and ASA 81mg PO daily. She reports good compliance. No bleeding. Some bruising on her arms or legs when bumping into things, resolve quickly. She has been feeling well, good energy. Trying to stay active, walking 3-4 days a week. Had a mouth sore for about a week last month on the inside of her lip, has since resolved. Her mother had polycythemia vera, required therapeutic phlebotomy. PAST HISTORY: The following sections were reviewed and updated in the EMR as appropriate: PMH, SH, FH, Medications, Allergies. Allergies   Allergen Reactions    Cat Dander Shortness of Breath    Dog Dander Shortness of Breath    Mold Shortness of Breath    Other Plant, Animal, Environmental Shortness of Breath     Dust      Review of Systems: A complete review of systems was obtained, reviewed, and scanned into the EMR. Pertinent findings reviewed above. Physical Exam:     There were no vitals taken for this visit.   General: alert, cooperative, no distress   Mental  status: normal mood, behavior, speech, dress, motor activity, and thought processes, able to follow commands   HENT: NCAT   Neck: no visualized mass   Resp: no respiratory distress   Neuro: no gross deficits   Skin: no discoloration or lesions of concern on visible areas   Psychiatric: normal affect, consistent with stated mood, no evidence of hallucinations       Due to this being a TeleHealth evaluation (During RFDUN-21 public health emergency), many elements of the physical examination are unable to be assessed. Evaluation of the following organ systems was limited: Vitals/Constitutional/EENT/Resp/CV/GI//MS/Neuro/Skin/Heme-Lymph-Imm. Results:     Lab Results   Component Value Date/Time    WBC 5.9 05/14/2020 09:46 AM    HGB 14.9 05/14/2020 09:46 AM    HCT 43.5 05/14/2020 09:46 AM    PLATELET 638 43/85/1774 09:46 AM     (H) 05/14/2020 09:46 AM    ABS. NEUTROPHILS 3.3 05/14/2020 09:46 AM     Lab Results   Component Value Date/Time    Sodium 143 05/14/2020 09:46 AM    Potassium 4.6 05/14/2020 09:46 AM    Chloride 100 05/14/2020 09:46 AM    CO2 28 05/14/2020 09:46 AM    Glucose 72 05/14/2020 09:46 AM    BUN 22 05/14/2020 09:46 AM    Creatinine 0.83 05/14/2020 09:46 AM    GFR est AA 79 05/14/2020 09:46 AM    GFR est non-AA 68 05/14/2020 09:46 AM    Calcium 9.9 05/14/2020 09:46 AM    Glucose (POC) 126 (H) 08/22/2012 06:54 AM     Lab Results   Component Value Date/Time    Bilirubin, total 0.4 05/14/2020 09:46 AM    ALT (SGPT) 26 05/14/2020 09:46 AM    AST (SGOT) 31 05/14/2020 09:46 AM    Alk.  phosphatase 86 05/14/2020 09:46 AM    Protein, total 6.8 05/14/2020 09:46 AM    Albumin 4.5 05/14/2020 09:46 AM    Globulin 3.2 02/14/2020 02:25 PM     Lab Results   Component Value Date/Time    Iron % saturation 29 06/14/2018 10:25 AM    TIBC 312 06/14/2018 10:25 AM    Ferritin 178 (H) 06/14/2018 10:25 AM    Vitamin B12 1,688 (H) 06/14/2018 10:25 AM    Folate 18.5 06/14/2018 10:25 AM    Erythropoietin 5.1 02/28/2019 12:54 PM     02/28/2019 12:54 PM    Sed rate (ESR) 3 02/28/2019 12:54 PM    C-Reactive protein 0.50 08/02/2012 11:47 AM    C-Reactive Protein, Qt 1.1 02/28/2019 12:54 PM    TSH 0.758 06/14/2018 10:25 AM    TSH 3.02 08/02/2012 11:47 AM    Lipase 92 (H) 02/28/2019 12:54 PM     PLATELET Date Value   05/14/2020 425 x10E3/uL   02/14/2020 432 K/uL (H)   12/20/2019 422 K/uL (H)   11/14/2019 458 x10E3/uL (H)   08/13/2019 434 x10E3/uL   07/11/2019 434 x10E3/uL   06/11/2019 465 x10E3/uL (H)   05/09/2019 480 x10E3/uL (H)   03/28/2019 491 K/uL (H)   02/28/2019 547 x10E3/uL (H)   06/14/2018 458 x10E3/uL (H)   08/21/2012 321 K/uL   08/02/2012 298 K/uL       9/20/2018  WBC: 6.1  HGB: 14.0  PLT: 474  Smear: Platelets vary in size, otherwise unremarkable  Creatinine: 0.68  AST, ALT, ALP, TBili: wnl  Ferritin: 122  Iron saturation: 19%    2/20/2019 (from PCP)  WBC: 7.3  HGB: 14.3  PLT: 498    2/20/2019 (patient supplied)  WBC: 7.8  HGB: 14.2  HCT: 43.7  PLT: 514  B12: 1559  Folate: 16.7  Iron sat: 25%  Ferritin: 125  Copper: 94  Zinc: 90  TSH: 1.62    JAK2 2/28/2019: POSITIVE for the detection of the V617F mutation. Erythropoietin 5.1      Bone marrow biopsy 4/9/2019: estimated normocellular marrow for age with maturing trilineage hematopoiesis, mildly increased megakaryocytes and no increase in blasts. Preliminary comment:  Findings are of a JAK2 positive myeloproliferative neoplasm, most consistent with essential thrombocythemia. Assessment:   1) Essential Thrombocythemia  JAK2+  Diagnosis confirmed on bone marrow biopsy. She has an IPSET prognosis score of 2, which is intermediate risk and translates to a median overall survival of 17 years to 24.5 years, depending on the study. Her IPSET thrombosis score is high risk based on age and JAK2 mutation, with a 3.6%/year risk of thrombosis. She began hydrea therapy 4/2019, with goal of reducing her platelets into the normal range to reduce her risk of thrombotic complications. She is tolerating hydrea well, on 500mg PO daily. PLT are at goal <450. Continue therapy and monitor. She should continue ASA 81mg PO daily to reduce her risk of thrombotic complications.     Interestingly, her mother had PV, so there may be a familial component to her illness. 2) Hot flashes  Chronic. Uncertain etiology. Stable. Monitor. 3) Left knee pain  Following with orthopedics, improved with injections to knee    4) Mouth sores  Intermittent, possibly from hydrea. Discussed salt water rinses, OTC oragel PRN    5) Emotional Well Being  No psychosocial concerns identified today. Patient has adequate support. Plan:     · Continue Hydroxyurea 500mg daily  · Continue ASA 81mg daily  · Labs in 3 months: CBC and CMP (She prefers [employer])  · Return to see me in 3 months (ok for virtual if she would like)      Signed By: Tali Alexander MD      I was in the office while conducting this encounter. The patient was at her home. Consent:  She and/or her healthcare decision maker is aware that this patient-initiated Telehealth encounter is a billable service, with coverage as determined by her insurance carrier. She is aware that she may receive a bill and has provided verbal consent to proceed: Yes    Pursuant to the emergency declaration under the [employer] and the [employer], 1135 waiver authority and the [employer] and [employer] General Act, this Virtual  Visit was conducted, with patient's (and/or legal guardian's) consent, to reduce the patient's risk of exposure to COVID-19 and provide necessary medical care. Services were provided through a video synchronous discussion virtually to substitute for in-person visit.

## 2020-05-21 ENCOUNTER — VIRTUAL VISIT (OUTPATIENT)
Dept: ONCOLOGY | Age: 75
End: 2020-05-21

## 2020-05-21 DIAGNOSIS — D47.3 ESSENTIAL THROMBOCYTOSIS (HCC): Primary | ICD-10-CM

## 2020-05-21 NOTE — PATIENT INSTRUCTIONS
Thank you for participating in the virtual visit with Dr. Jazmyn Maria. Someone will call you to schedule a follow up appointment with us in 3 months. If you do not hear from us, please call us at [phone] to schedule your appointment. Please use the enclosed lab slip to have your labs done before your next appointment.

## 2020-05-21 NOTE — PROGRESS NOTES
Ruth Class is a 66 y.o. female follow up for ET. 1. Have you been to the ER, urgent care clinic since your last visit? Hospitalized since your last visit?no     2. Have you seen or consulted any other health care providers outside of the [address] [place] [address] since your last visit? Include any pap smears or colon screening.  no

## 2020-08-12 NOTE — PROGRESS NOTES
[facility] [place] [facility] [place] [address] [address] [facility] [address] [facility]  Mabel Drop: [phone]  F: [phone]      Reason for Visit:   Julieth Leone is a 66 y.o. female who is seen by synchronous (real-time) audio-video technology for follow up of essential thrombocythemia. Treatment History:   · Presented with thrombocytosis  · JAK2 V617 mutation positive on 2/28/2019  · Bone marrow biopsy 4/9/2019: JAK2 positive myeloproliferative neoplasm, most consistent with essential thrombocythemia. · Essential Thrombocythemia  · Hydrea started 4/13/2019    History of Present Illness:   She remains on hydrea 500mg PO daily and ASA 81mg PO daily. She denies any new issues. No bleeding. Occasional mouth sores, about once every 6 weeks or so, don't last more than a couple days. Energy fair. Her mother had polycythemia vera, required therapeutic phlebotomy. PAST HISTORY: The following sections were reviewed and updated in the EMR as appropriate: PMH, SH, FH, Medications, Allergies. Allergies   Allergen Reactions    Cat Dander Shortness of Breath    Dog Dander Shortness of Breath    Mold Shortness of Breath    Other Plant, Animal, Environmental Shortness of Breath     Dust      Review of Systems: A complete review of systems was obtained, reviewed, and scanned into the EMR. Pertinent findings reviewed above. Physical Exam:     There were no vitals taken for this visit.   General: alert, cooperative, no distress   Mental  status: normal mood, behavior, speech, dress, motor activity, and thought processes, able to follow commands   HENT: NCAT   Neck: no visualized mass   Resp: no respiratory distress   Neuro: no gross deficits   Skin: no discoloration or lesions of concern on visible areas   Psychiatric: normal affect, consistent with stated mood, no evidence of hallucinations       Due to this being a TeleHealth evaluation (During QGOAO-15 public health emergency), many elements of the physical examination are unable to be assessed. Evaluation of the following organ systems was limited: Vitals/Constitutional/EENT/Resp/CV/GI//MS/Neuro/Skin/Heme-Lymph-Imm. Results:     Lab Results   Component Value Date/Time    WBC 7.1 08/17/2020 10:12 AM    HGB 14.1 08/17/2020 10:12 AM    HCT 44.4 08/17/2020 10:12 AM    PLATELET 665 (H) 68/27/6280 10:12 AM    .3 (H) 08/17/2020 10:12 AM    ABS. NEUTROPHILS 4.4 08/17/2020 10:12 AM     Lab Results   Component Value Date/Time    Sodium 139 08/17/2020 10:12 AM    Potassium 4.8 08/17/2020 10:12 AM    Chloride 104 08/17/2020 10:12 AM    CO2 31 08/17/2020 10:12 AM    Glucose 84 08/17/2020 10:12 AM    BUN 20 08/17/2020 10:12 AM    Creatinine 0.71 08/17/2020 10:12 AM    GFR est AA >60 08/17/2020 10:12 AM    GFR est non-AA >60 08/17/2020 10:12 AM    Calcium 9.4 08/17/2020 10:12 AM    Glucose (POC) 126 (H) 08/22/2012 06:54 AM     Lab Results   Component Value Date/Time    Bilirubin, total 0.4 08/17/2020 10:12 AM    ALT (SGPT) 43 08/17/2020 10:12 AM    Alk.  phosphatase 94 08/17/2020 10:12 AM    Protein, total 7.0 08/17/2020 10:12 AM    Albumin 3.8 08/17/2020 10:12 AM    Globulin 3.2 08/17/2020 10:12 AM     Lab Results   Component Value Date/Time    Iron % saturation 29 06/14/2018 10:25 AM    TIBC 312 06/14/2018 10:25 AM    Ferritin 178 (H) 06/14/2018 10:25 AM    Vitamin B12 1,688 (H) 06/14/2018 10:25 AM    Folate 18.5 06/14/2018 10:25 AM    Erythropoietin 5.1 02/28/2019 12:54 PM     02/28/2019 12:54 PM    Sed rate (ESR) 3 02/28/2019 12:54 PM    C-Reactive protein 0.50 08/02/2012 11:47 AM    C-Reactive Protein, Qt 1.1 02/28/2019 12:54 PM    TSH 0.758 06/14/2018 10:25 AM    TSH 3.02 08/02/2012 11:47 AM    Lipase 92 (H) 02/28/2019 12:54 PM     PLATELET   Date Value   08/17/2020 415 K/uL (H)   05/14/2020 425 x10E3/uL   02/14/2020 432 K/uL (H)   12/20/2019 422 K/uL (H)   11/14/2019 458 x10E3/uL (H)   08/13/2019 434 x10E3/uL   07/11/2019 434 x10E3/uL   06/11/2019 465 x10E3/uL (H)   05/09/2019 480 x10E3/uL (H)   03/28/2019 491 K/uL (H)   02/28/2019 547 x10E3/uL (H)   06/14/2018 458 x10E3/uL (H)   08/21/2012 321 K/uL   08/02/2012 298 K/uL       9/20/2018  WBC: 6.1  HGB: 14.0  PLT: 474  Smear: Platelets vary in size, otherwise unremarkable  Creatinine: 0.68  AST, ALT, ALP, TBili: wnl  Ferritin: 122  Iron saturation: 19%    2/20/2019 (from PCP)  WBC: 7.3  HGB: 14.3  PLT: 498    2/20/2019 (patient supplied)  WBC: 7.8  HGB: 14.2  HCT: 43.7  PLT: 514  B12: 1559  Folate: 16.7  Iron sat: 25%  Ferritin: 125  Copper: 94  Zinc: 90  TSH: 1.62    JAK2 2/28/2019: POSITIVE for the detection of the V617F mutation. Erythropoietin 5.1      Bone marrow biopsy 4/9/2019: estimated normocellular marrow for age with maturing trilineage hematopoiesis, mildly increased megakaryocytes and no increase in blasts. Preliminary comment:  Findings are of a JAK2 positive myeloproliferative neoplasm, most consistent with essential thrombocythemia. Assessment:   1) Essential Thrombocythemia  JAK2+  Diagnosis confirmed on bone marrow biopsy. She has an IPSET prognosis score of 2, which is intermediate risk and translates to a median overall survival of 17 years to 24.5 years, depending on the study. Her IPSET thrombosis score is high risk based on age and JAK2 mutation, with a 3.6%/year risk of thrombosis. She began hydrea therapy 4/2019, with goal of reducing her platelets into the normal range to reduce her risk of thrombotic complications. She is tolerating hydrea well, on 500mg PO daily. PLT are at goal <450. Continue therapy and monitor. She should continue ASA 81mg PO daily to reduce her risk of thrombotic complications. Interestingly, her mother had PV, so there may be a familial component to her illness. 2) Hot flashes  Chronic. Uncertain etiology. Stable. Monitor.     3) Left knee pain  Following with orthopedics, improved with injections to knee    4) Mouth sores  Intermittent, possibly from hydrea. Discussed salt water rinses, OTC oragel PRN    5) Emotional Well Being  No psychosocial concerns identified today. Patient has adequate support. Plan:     · Continue Hydroxyurea 500mg daily  · Continue ASA 81mg daily  · Labs in 3 months: CBC and CMP (She prefers [employer])  · Return to see me in 3 months    [id number] [facility] for video visits. Signed By: Alex Godinez MD      I was in the office while conducting this encounter. The patient was at her home. Consent:  She and/or her healthcare decision maker is aware that this patient-initiated Telehealth encounter is a billable service, with coverage as determined by her insurance carrier. She is aware that she may receive a bill and has provided verbal consent to proceed: Yes    Pursuant to the emergency declaration under the [employer] and the [facility], 1135 waiver authority and the [employer] Act, this Virtual  Visit was conducted, with patient's (and/or legal guardian's) consent, to reduce the patient's risk of exposure to COVID-19 and provide necessary medical care. Services were provided through a video synchronous discussion virtually to substitute for in-person visit.

## 2020-08-17 ENCOUNTER — HOSPITAL ENCOUNTER (OUTPATIENT)
Dept: LAB | Age: 75
Discharge: HOME | End: 2020-08-17

## 2020-08-17 DIAGNOSIS — D47.3 ESSENTIAL THROMBOCYTOSIS (HCC): ICD-10-CM

## 2020-08-17 LAB
ALBUMIN SERPL-MCNC: 3.8 G/DL (ref 3.5–5)
ALBUMIN/GLOB SERPL: 1.2 {RATIO} (ref 1.1–2.2)
ALP SERPL-CCNC: 94 U/L (ref 45–117)
ALT SERPL-CCNC: 43 U/L (ref 12–78)
ANION GAP SERPL CALC-SCNC: 4 MMOL/L (ref 5–15)
AST SERPL-CCNC: 35 U/L (ref 15–37)
BASOPHILS # BLD: 0.1 K/UL (ref 0–0.1)
BASOPHILS NFR BLD: 1 % (ref 0–1)
BILIRUB SERPL-MCNC: 0.4 MG/DL (ref 0.2–1)
BUN SERPL-MCNC: 20 MG/DL (ref 6–20)
BUN/CREAT SERPL: 28 (ref 12–20)
CALCIUM SERPL-MCNC: 9.4 MG/DL (ref 8.5–10.1)
CHLORIDE SERPL-SCNC: 104 MMOL/L (ref 97–108)
CO2 SERPL-SCNC: 31 MMOL/L (ref 21–32)
CREAT SERPL-MCNC: 0.71 MG/DL (ref 0.55–1.02)
DIFFERENTIAL METHOD BLD: ABNORMAL
EOSINOPHIL # BLD: 0.2 K/UL (ref 0–0.4)
EOSINOPHIL NFR BLD: 3 % (ref 0–7)
ERYTHROCYTE [DISTWIDTH] IN BLOOD BY AUTOMATED COUNT: 13.5 % (ref 11.5–14.5)
GLOBULIN SER CALC-MCNC: 3.2 G/DL (ref 2–4)
GLUCOSE SERPL-MCNC: 84 MG/DL (ref 65–100)
HCT VFR BLD AUTO: 44.4 % (ref 35–47)
HGB BLD-MCNC: 14.1 G/DL (ref 11.5–16)
IMM GRANULOCYTES # BLD AUTO: 0 K/UL (ref 0–0.04)
IMM GRANULOCYTES NFR BLD AUTO: 0 % (ref 0–0.5)
LYMPHOCYTES # BLD: 2 K/UL (ref 0.8–3.5)
LYMPHOCYTES NFR BLD: 28 % (ref 12–49)
MCH RBC QN AUTO: 34.4 PG (ref 26–34)
MCHC RBC AUTO-ENTMCNC: 31.8 G/DL (ref 30–36.5)
MCV RBC AUTO: 108.3 FL (ref 80–99)
MONOCYTES # BLD: 0.4 K/UL (ref 0–1)
MONOCYTES NFR BLD: 6 % (ref 5–13)
NEUTS SEG # BLD: 4.4 K/UL (ref 1.8–8)
NEUTS SEG NFR BLD: 62 % (ref 32–75)
NRBC # BLD: 0 K/UL (ref 0–0.01)
NRBC BLD-RTO: 0 PER 100 WBC
PLATELET # BLD AUTO: 415 K/UL (ref 150–400)
PMV BLD AUTO: 9.9 FL (ref 8.9–12.9)
POTASSIUM SERPL-SCNC: 4.8 MMOL/L (ref 3.5–5.1)
PROT SERPL-MCNC: 7 G/DL (ref 6.4–8.2)
RBC # BLD AUTO: 4.1 M/UL (ref 3.8–5.2)
RBC MORPH BLD: ABNORMAL
RBC MORPH BLD: ABNORMAL
SODIUM SERPL-SCNC: 139 MMOL/L (ref 136–145)
WBC # BLD AUTO: 7.1 K/UL (ref 3.6–11)

## 2020-08-20 ENCOUNTER — VIRTUAL VISIT (OUTPATIENT)
Dept: ONCOLOGY | Age: 75
End: 2020-08-20
Payer: MEDICARE

## 2020-08-20 ENCOUNTER — TELEPHONE (OUTPATIENT)
Dept: ONCOLOGY | Age: 75
End: 2020-08-20

## 2020-08-20 DIAGNOSIS — D47.3 ESSENTIAL THROMBOCYTOSIS (HCC): Primary | ICD-10-CM

## 2020-08-20 PROCEDURE — G8427 DOCREV CUR MEDS BY ELIG CLIN: HCPCS | Performed by: INTERNAL MEDICINE

## 2020-08-20 PROCEDURE — G9717 DOC PT DX DEP/BP F/U NT REQ: HCPCS | Performed by: INTERNAL MEDICINE

## 2020-08-20 PROCEDURE — G8417 CALC BMI ABV UP PARAM F/U: HCPCS | Performed by: INTERNAL MEDICINE

## 2020-08-20 PROCEDURE — 1101F PT FALLS ASSESS-DOCD LE1/YR: CPT | Performed by: INTERNAL MEDICINE

## 2020-08-20 PROCEDURE — G8756 NO BP MEASURE DOC: HCPCS | Performed by: INTERNAL MEDICINE

## 2020-08-20 PROCEDURE — G0463 HOSPITAL OUTPT CLINIC VISIT: HCPCS | Performed by: INTERNAL MEDICINE

## 2020-08-20 PROCEDURE — G8399 PT W/DXA RESULTS DOCUMENT: HCPCS | Performed by: INTERNAL MEDICINE

## 2020-08-20 PROCEDURE — 1090F PRES/ABSN URINE INCON ASSESS: CPT | Performed by: INTERNAL MEDICINE

## 2020-08-20 PROCEDURE — 99214 OFFICE O/P EST MOD 30 MIN: CPT | Performed by: INTERNAL MEDICINE

## 2020-08-20 PROCEDURE — G8536 NO DOC ELDER MAL SCRN: HCPCS | Performed by: INTERNAL MEDICINE

## 2020-08-20 NOTE — PATIENT INSTRUCTIONS
Thank you for participating in the virtual visit with Dr. Polina Estrada. We will call you soon to schedule a follow up appointment with us in 3 months. If you do not hear from us, please call us at [phone] to schedule your appointment. Please use the enclosed lab slip to have your labs done before your next appointment.

## 2020-08-20 NOTE — PROGRESS NOTES
Tang Augustine is a 66 y.o. female follow up for ET. 1. Have you been to the ER, urgent care clinic since your last visit? Hospitalized since your last visit?no     2. Have you seen or consulted any other health care providers outside of the [address] [place] [address] since your last visit? Include any pap smears or colon screening.  no

## 2020-11-09 ENCOUNTER — TELEPHONE (OUTPATIENT)
Dept: ONCOLOGY | Age: 75
End: 2020-11-09

## 2020-11-09 NOTE — TELEPHONE ENCOUNTER
[address] at [place]  [phone]    11/09/20- Phone call placed to pt to remind pt to have labs drawn prior to her follow up appointment with . Pt verbalized understanding.

## 2020-11-13 NOTE — PROGRESS NOTES
[facility] [place] at [address], [address] [facility] 1007 [facility]  Dori Vazker: [phone]  F: [phone]      Reason for Visit:   Malik Gomez is a 66 y.o. female who is seen by synchronous (real-time) audio-video technology for follow up of essential thrombocythemia. Treatment History:   · Presented with thrombocytosis  · JAK2 V617 mutation positive on 2/28/2019  · Bone marrow biopsy 4/9/2019: JAK2 positive myeloproliferative neoplasm, most consistent with essential thrombocythemia. · Essential Thrombocythemia  · Hydrea started 4/13/2019    History of Present Illness:   She reports some increasing fatigue over the past few months. Not able to do much in the afternoon. Gaining weight. Saw her PCP and had some evaluation completed, including repeat sleep study. Now back on a CPAP which has helped quite a bit, but not completely resolved the issue. She also reports he changed her synthroid dosing. Remains on ASA, no bleeding. Her mother had polycythemia vera, required therapeutic phlebotomy. PAST HISTORY: The following sections were reviewed and updated in the EMR as appropriate: PMH, SH, FH, Medications, Allergies. Allergies   Allergen Reactions    Cat Dander Shortness of Breath    Dog Dander Shortness of Breath    Mold Shortness of Breath    Other Plant, Animal, Environmental Shortness of Breath     Dust      Review of Systems: A complete review of systems was obtained, reviewed, and scanned into the EMR. Pertinent findings reviewed above. Physical Exam:     There were no vitals taken for this visit.   General: alert, cooperative, no distress   Mental  status: normal mood, behavior, speech, dress, motor activity, and thought processes, able to follow commands   HENT: NCAT   Neck: no visualized mass   Resp: no respiratory distress   Neuro: no gross deficits   Skin: no discoloration or lesions of concern on visible areas   Psychiatric: normal affect, consistent with stated mood, no evidence of hallucinations       Due to this being a TeleHealth evaluation (During MLIRA-08 public health emergency), many elements of the physical examination are unable to be assessed. Evaluation of the following organ systems was limited: Vitals/Constitutional/EENT/Resp/CV/GI//MS/Neuro/Skin/Heme-Lymph-Imm. Results:     Lab Results   Component Value Date/Time    WBC 7.8 11/16/2020 01:40 PM    HGB 14.2 11/16/2020 01:40 PM    HCT 44.2 11/16/2020 01:40 PM    PLATELET 942 (H) 06/07/6488 01:40 PM    .0 (H) 11/16/2020 01:40 PM    ABS. NEUTROPHILS 4.6 11/16/2020 01:40 PM     Lab Results   Component Value Date/Time    Sodium 142 11/16/2020 01:40 PM    Potassium 4.5 11/16/2020 01:40 PM    Chloride 108 11/16/2020 01:40 PM    CO2 30 11/16/2020 01:40 PM    Glucose 72 11/16/2020 01:40 PM    BUN 21 (H) 11/16/2020 01:40 PM    Creatinine 0.83 11/16/2020 01:40 PM    GFR est AA >60 11/16/2020 01:40 PM    GFR est non-AA >60 11/16/2020 01:40 PM    Calcium 9.2 11/16/2020 01:40 PM    Glucose (POC) 126 (H) 08/22/2012 06:54 AM     Lab Results   Component Value Date/Time    Bilirubin, total 0.3 11/16/2020 01:40 PM    ALT (SGPT) 27 11/16/2020 01:40 PM    Alk.  phosphatase 94 11/16/2020 01:40 PM    Protein, total 6.6 11/16/2020 01:40 PM    Albumin 3.6 11/16/2020 01:40 PM    Globulin 3.0 11/16/2020 01:40 PM     Lab Results   Component Value Date/Time    Iron % saturation 29 06/14/2018 10:25 AM    TIBC 312 06/14/2018 10:25 AM    Ferritin 178 (H) 06/14/2018 10:25 AM    Vitamin B12 1,688 (H) 06/14/2018 10:25 AM    Folate 18.5 06/14/2018 10:25 AM    Erythropoietin 5.1 02/28/2019 12:54 PM     02/28/2019 12:54 PM    Sed rate (ESR) 3 02/28/2019 12:54 PM    C-Reactive protein 0.50 08/02/2012 11:47 AM    C-Reactive Protein, Qt 1.1 02/28/2019 12:54 PM    TSH 0.758 06/14/2018 10:25 AM    TSH 3.02 08/02/2012 11:47 AM    Lipase 92 (H) 02/28/2019 12:54 PM     PLATELET   Date Value   11/16/2020 433 K/uL (H)   08/17/2020 415 K/uL (H)   05/14/2020 425 x10E3/uL   02/14/2020 432 K/uL (H)   12/20/2019 422 K/uL (H)   11/14/2019 458 x10E3/uL (H)   08/13/2019 434 x10E3/uL   07/11/2019 434 x10E3/uL   06/11/2019 465 x10E3/uL (H)   05/09/2019 480 x10E3/uL (H)   03/28/2019 491 K/uL (H)   02/28/2019 547 x10E3/uL (H)   06/14/2018 458 x10E3/uL (H)   08/21/2012 321 K/uL   08/02/2012 298 K/uL       9/20/2018  WBC: 6.1  HGB: 14.0  PLT: 474  Smear: Platelets vary in size, otherwise unremarkable  Creatinine: 0.68  AST, ALT, ALP, TBili: wnl  Ferritin: 122  Iron saturation: 19%    2/20/2019 (from PCP)  WBC: 7.3  HGB: 14.3  PLT: 498    2/20/2019 (patient supplied)  WBC: 7.8  HGB: 14.2  HCT: 43.7  PLT: 514  B12: 1559  Folate: 16.7  Iron sat: 25%  Ferritin: 125  Copper: 94  Zinc: 90  TSH: 1.62    JAK2 2/28/2019: POSITIVE for the detection of the V617F mutation. Erythropoietin 5.1      Bone marrow biopsy 4/9/2019: estimated normocellular marrow for age with maturing trilineage hematopoiesis, mildly increased megakaryocytes and no increase in blasts. Preliminary comment:  Findings are of a JAK2 positive myeloproliferative neoplasm, most consistent with essential thrombocythemia. Assessment:   1) Essential Thrombocythemia  JAK2+  Diagnosis confirmed on bone marrow biopsy. She has an IPSET prognosis score of 2, which is intermediate risk and translates to a median overall survival of 17 years to 24.5 years, depending on the study. Her IPSET thrombosis score is high risk based on age and JAK2 mutation, with a 3.6%/year risk of thrombosis. She began hydrea therapy 4/2019, with goal of reducing her platelets into the normal range to reduce her risk of thrombotic complications. She is tolerating hydrea well, on 500mg PO daily. Platelets remain at goal of <450k. Continue therapy and monitor. She should continue ASA 81mg PO daily to reduce her risk of thrombotic complications.     Interestingly, her mother had PV, so there may be a familial component to her illness. 2) Hot flashes  Chronic. Uncertain etiology. Improved lately, but still occur daily. Monitor. 3) Mouth sores  Intermittent, possibly from hydrea. Overall improved, occur once every 5-6 weeks. Have discussed salt water rinses, OTC oragel PRN    4) Fatigue  Improving with CPAP    5) Emotional Well Being  No psychosocial concerns identified today. Patient has adequate support. Plan:     · Continue Hydroxyurea 500mg daily  · Continue ASA 81mg daily  · Labs in 3 months: CBC and CMP (She prefers [employer])  · Return to see me in 3 months    [facility] for video visits. Signed By: Carlos Pineda MD      I was in the office while conducting this encounter. The patient was at her home. Consent:  She and/or her healthcare decision maker is aware that this patient-initiated Telehealth encounter is a billable service, with coverage as determined by her insurance carrier. She is aware that she may receive a bill and has provided verbal consent to proceed: Yes    Pursuant to the emergency declaration under the [address] and the [facility], 1135 waiver authority and the [employer] Act, this Virtual  Visit was conducted, with patient's (and/or legal guardian's) consent, to reduce the patient's risk of exposure to COVID-19 and provide necessary medical care. Services were provided through a video synchronous discussion virtually to substitute for in-person visit.

## 2020-11-16 DIAGNOSIS — D47.3 ESSENTIAL THROMBOCYTOSIS (HCC): ICD-10-CM

## 2020-11-16 LAB
ALBUMIN SERPL-MCNC: 3.6 G/DL (ref 3.5–5)
ALBUMIN/GLOB SERPL: 1.2 {RATIO} (ref 1.1–2.2)
ALP SERPL-CCNC: 94 U/L (ref 45–117)
ALT SERPL-CCNC: 27 U/L (ref 12–78)
ANION GAP SERPL CALC-SCNC: 4 MMOL/L (ref 5–15)
AST SERPL-CCNC: 22 U/L (ref 15–37)
BASOPHILS # BLD: 0 K/UL (ref 0–0.1)
BASOPHILS NFR BLD: 1 % (ref 0–1)
BILIRUB SERPL-MCNC: 0.3 MG/DL (ref 0.2–1)
BUN SERPL-MCNC: 21 MG/DL (ref 6–20)
BUN/CREAT SERPL: 25 (ref 12–20)
CALCIUM SERPL-MCNC: 9.2 MG/DL (ref 8.5–10.1)
CHLORIDE SERPL-SCNC: 108 MMOL/L (ref 97–108)
CO2 SERPL-SCNC: 30 MMOL/L (ref 21–32)
CREAT SERPL-MCNC: 0.83 MG/DL (ref 0.55–1.02)
DIFFERENTIAL METHOD BLD: ABNORMAL
EOSINOPHIL # BLD: 0.2 K/UL (ref 0–0.4)
EOSINOPHIL NFR BLD: 3 % (ref 0–7)
ERYTHROCYTE [DISTWIDTH] IN BLOOD BY AUTOMATED COUNT: 13.7 % (ref 11.5–14.5)
GLOBULIN SER CALC-MCNC: 3 G/DL (ref 2–4)
GLUCOSE SERPL-MCNC: 72 MG/DL (ref 65–100)
HCT VFR BLD AUTO: 44.2 % (ref 35–47)
HGB BLD-MCNC: 14.2 G/DL (ref 11.5–16)
IMM GRANULOCYTES # BLD AUTO: 0 K/UL (ref 0–0.04)
IMM GRANULOCYTES NFR BLD AUTO: 0 % (ref 0–0.5)
LYMPHOCYTES # BLD: 2.4 K/UL (ref 0.8–3.5)
LYMPHOCYTES NFR BLD: 30 % (ref 12–49)
MCH RBC QN AUTO: 34.4 PG (ref 26–34)
MCHC RBC AUTO-ENTMCNC: 32.1 G/DL (ref 30–36.5)
MCV RBC AUTO: 107 FL (ref 80–99)
MONOCYTES # BLD: 0.6 K/UL (ref 0–1)
MONOCYTES NFR BLD: 7 % (ref 5–13)
NEUTS SEG # BLD: 4.6 K/UL (ref 1.8–8)
NEUTS SEG NFR BLD: 59 % (ref 32–75)
NRBC # BLD: 0 K/UL (ref 0–0.01)
NRBC BLD-RTO: 0 PER 100 WBC
PLATELET # BLD AUTO: 433 K/UL (ref 150–400)
PMV BLD AUTO: 9.7 FL (ref 8.9–12.9)
POTASSIUM SERPL-SCNC: 4.5 MMOL/L (ref 3.5–5.1)
PROT SERPL-MCNC: 6.6 G/DL (ref 6.4–8.2)
RBC # BLD AUTO: 4.13 M/UL (ref 3.8–5.2)
SODIUM SERPL-SCNC: 142 MMOL/L (ref 136–145)
WBC # BLD AUTO: 7.8 K/UL (ref 3.6–11)

## 2020-11-19 ENCOUNTER — VIRTUAL VISIT (OUTPATIENT)
Dept: ONCOLOGY | Age: 75
End: 2020-11-19
Payer: MEDICARE

## 2020-11-19 ENCOUNTER — TELEPHONE (OUTPATIENT)
Dept: ONCOLOGY | Age: 75
End: 2020-11-19

## 2020-11-19 DIAGNOSIS — D47.3 ESSENTIAL THROMBOCYTOSIS (HCC): Primary | ICD-10-CM

## 2020-11-19 PROCEDURE — 1101F PT FALLS ASSESS-DOCD LE1/YR: CPT | Performed by: INTERNAL MEDICINE

## 2020-11-19 PROCEDURE — G8536 NO DOC ELDER MAL SCRN: HCPCS | Performed by: INTERNAL MEDICINE

## 2020-11-19 PROCEDURE — G8399 PT W/DXA RESULTS DOCUMENT: HCPCS | Performed by: INTERNAL MEDICINE

## 2020-11-19 PROCEDURE — 1090F PRES/ABSN URINE INCON ASSESS: CPT | Performed by: INTERNAL MEDICINE

## 2020-11-19 PROCEDURE — G0463 HOSPITAL OUTPT CLINIC VISIT: HCPCS | Performed by: INTERNAL MEDICINE

## 2020-11-19 PROCEDURE — 99214 OFFICE O/P EST MOD 30 MIN: CPT | Performed by: INTERNAL MEDICINE

## 2020-11-19 PROCEDURE — G8427 DOCREV CUR MEDS BY ELIG CLIN: HCPCS | Performed by: INTERNAL MEDICINE

## 2020-11-19 PROCEDURE — G9717 DOC PT DX DEP/BP F/U NT REQ: HCPCS | Performed by: INTERNAL MEDICINE

## 2020-11-19 PROCEDURE — G8756 NO BP MEASURE DOC: HCPCS | Performed by: INTERNAL MEDICINE

## 2020-11-19 PROCEDURE — G8417 CALC BMI ABV UP PARAM F/U: HCPCS | Performed by: INTERNAL MEDICINE

## 2020-11-19 NOTE — PATIENT INSTRUCTIONS
Thank you for participating in the virtual visit with Dr. Margaret Elena. We will call you soon to schedule a follow up appointment with us in 3 months. If you do not hear from us, please call us at [phone] to schedule your appointment. Please use the enclosed lab slip to have your labs done before your next appointment.

## 2020-11-19 NOTE — PROGRESS NOTES
Chief Complaint   Patient presents with    Other     thrombocythemia     [email or web address]

## 2021-02-08 ENCOUNTER — TELEPHONE (OUTPATIENT)
Dept: ONCOLOGY | Age: 76
End: 2021-02-08

## 2021-02-08 NOTE — TELEPHONE ENCOUNTER
[facility] at [facility]  [phone]    02/08/21- Phone call placed to pt to remind pt to have labs drawn prior to her follow up appointment with . Pt verbalized understanding.

## 2021-02-15 NOTE — PROGRESS NOTES
[place] [place] [address]  [address] [facility], [facility]  [place]: [phone]  F: [phone]      Reason for Visit:   Bry Baugh is a 66 y.o. female who is seen for follow up of essential thrombocythemia. Treatment History:   · Presented with thrombocytosis  · JAK2 V617 mutation positive on 2/28/2019  · Bone marrow biopsy 4/9/2019: JAK2 positive myeloproliferative neoplasm, most consistent with essential thrombocythemia. · Essential Thrombocythemia  · Hydrea started 4/13/2019    History of Present Illness:   She had a \"mini-stroke\" in December. She developed acute problems with her vision with significant flashing, couldn't read. Sat down and then developed memory loss, didn't recognize items in the house or people in photographs. Did not go to the hospital.  Symptoms lasted about 15 minutes. She saw her PCP a few days later and had an MRI which noted a CVA. She remains on ASA and hydrea 500mg PO daily. She was started on a statin as well as plavix and some additional BP medications. She denies any bleeding issues. Her mother had polycythemia vera, required therapeutic phlebotomy. Review of systems was obtained and pertinent findings reviewed above. Past medical history, social history, family history, medications, and allergies are located in the electronic medical record. Physical Exam:     There were no vitals taken for this visit.   General: alert, cooperative, no distress   Mental  status: normal mood, behavior, speech, dress, motor activity, and thought processes, able to follow commands   HENT: NCAT   Neck: no visualized mass   Resp: no respiratory distress   Neuro: no gross deficits   Skin: no discoloration or lesions of concern on visible areas   Psychiatric: normal affect, consistent with stated mood, no evidence of hallucinations       Due to this being a TeleHealth evaluation (During NMTCP-31 public health emergency), many elements of the physical examination are unable to be assessed. Evaluation of the following organ systems was limited: Vitals/Constitutional/EENT/Resp/CV/GI//MS/Neuro/Skin/Heme-Lymph-Imm. Results:     Lab Results   Component Value Date/Time    WBC 7.1 02/15/2021 01:45 PM    HGB 14.7 02/15/2021 01:45 PM    HCT 45.3 02/15/2021 01:45 PM    PLATELET 948 (H) 25/35/8770 01:45 PM    .3 (H) 02/15/2021 01:45 PM    ABS. NEUTROPHILS 3.9 02/15/2021 01:45 PM     Lab Results   Component Value Date/Time    Sodium 142 02/15/2021 01:45 PM    Potassium 4.4 02/15/2021 01:45 PM    Chloride 107 02/15/2021 01:45 PM    CO2 30 02/15/2021 01:45 PM    Glucose 77 02/15/2021 01:45 PM    BUN 19 02/15/2021 01:45 PM    Creatinine 0.79 02/15/2021 01:45 PM    GFR est AA >60 02/15/2021 01:45 PM    GFR est non-AA >60 02/15/2021 01:45 PM    Calcium 9.4 02/15/2021 01:45 PM    Glucose (POC) 126 (H) 08/22/2012 06:54 AM     Lab Results   Component Value Date/Time    Bilirubin, total 0.3 02/15/2021 01:45 PM    ALT (SGPT) 62 02/15/2021 01:45 PM    Alk.  phosphatase 102 02/15/2021 01:45 PM    Protein, total 7.1 02/15/2021 01:45 PM    Albumin 3.9 02/15/2021 01:45 PM    Globulin 3.2 02/15/2021 01:45 PM     Lab Results   Component Value Date/Time    Iron % saturation 29 06/14/2018 10:25 AM    TIBC 312 06/14/2018 10:25 AM    Ferritin 178 (H) 06/14/2018 10:25 AM    Vitamin B12 1,688 (H) 06/14/2018 10:25 AM    Folate 18.5 06/14/2018 10:25 AM    Erythropoietin 5.1 02/28/2019 12:54 PM     02/28/2019 12:54 PM    Sed rate (ESR) 3 02/28/2019 12:54 PM    C-Reactive protein 0.50 08/02/2012 11:47 AM    C-Reactive Protein, Qt 1.1 02/28/2019 12:54 PM    TSH 0.758 06/14/2018 10:25 AM    TSH 3.02 08/02/2012 11:47 AM    Lipase 92 (H) 02/28/2019 12:54 PM     PLATELET   Date Value   02/15/2021 440 K/uL (H)   11/16/2020 433 K/uL (H)   08/17/2020 415 K/uL (H)   05/14/2020 425 x10E3/uL   02/14/2020 432 K/uL (H)   12/20/2019 422 K/uL (H)   11/14/2019 458 x10E3/uL (H)   08/13/2019 434 x10E3/uL   07/11/2019 434 x10E3/uL   06/11/2019 465 x10E3/uL (H)   05/09/2019 480 x10E3/uL (H)   03/28/2019 491 K/uL (H)   02/28/2019 547 x10E3/uL (H)   06/14/2018 458 x10E3/uL (H)   08/21/2012 321 K/uL   08/02/2012 298 K/uL       9/20/2018  WBC: 6.1  HGB: 14.0  PLT: 474  Smear: Platelets vary in size, otherwise unremarkable  Creatinine: 0.68  AST, ALT, ALP, TBili: wnl  Ferritin: 122  Iron saturation: 19%    2/20/2019 (from PCP)  WBC: 7.3  HGB: 14.3  PLT: 498    2/20/2019 (patient supplied)  WBC: 7.8  HGB: 14.2  HCT: 43.7  PLT: 514  B12: 1559  Folate: 16.7  Iron sat: 25%  Ferritin: 125  Copper: 94  Zinc: 90  TSH: 1.62    JAK2 2/28/2019: POSITIVE for the detection of the V617F mutation. Erythropoietin 5.1      Bone marrow biopsy 4/9/2019: estimated normocellular marrow for age with maturing trilineage hematopoiesis, mildly increased megakaryocytes and no increase in blasts. Preliminary comment:  Findings are of a JAK2 positive myeloproliferative neoplasm, most consistent with essential thrombocythemia. Assessment:   1) Essential Thrombocythemia  JAK2+  Diagnosis confirmed on bone marrow biopsy. She has an IPSET prognosis score of 2, which is intermediate risk and translates to a median overall survival of 17 years to 24.5 years, depending on the study. Her IPSET thrombosis score is high risk based on age and JAK2 mutation, with a 3.6%/year risk of thrombosis. She began hydrea therapy 4/2019, with goal of reducing her platelets into the normal range to reduce her risk of thrombotic complications. She is tolerating hydrea well, on 500mg PO daily. Platelets remain at goal of <450k. However, with her recent CVA, I'd like to push them a bit lower. Increase dose to 1000mg PO daily. She should continue ASA 81mg PO daily to reduce her risk of thrombotic complications.     The patient will be seen every 3 months while on therapy, and labs will be monitored to assess for toxicity from therapy. Interestingly, her mother had PV, so there may be a familial component to her illness. 2) Hot flashes  Chronic. Uncertain etiology. Improved lately, currently on effexor which may be helping. 3) Mouth sores  Intermittent, possibly from hydrea. Overall improved, occur about once a month. Have discussed salt water rinses, OTC oragel PRN    4) H/o CVA  On ASA, plavix, statin, BP meds. PCP managing. She has an appointment with neurology in 2 weeks. Defer antiplatelet agent management/duration to neurology, though she should at least remain on ASA long-term given her ET. 5) Emotional Well Being  No psychosocial concerns identified today. Patient has adequate support. Plan:     · Continue Hydroxyurea, increase to 1000mg daily  · Neurology consultation pending  · Labs in 1-2 months: CBC and CMP (She prefers [employer])  · Return to see me in 1-2 months    [id number] [address] for video visits. Signed By: David Flynn MD      The patient was seen by synchronous (real-time) audio-video technology. I was in the office while conducting this encounter. The patient was at her home. Consent:  She and/or her healthcare decision maker is aware that this patient-initiated Telehealth encounter is a billable service, with coverage as determined by her insurance carrier. She is aware that she may receive a bill and has provided verbal consent to proceed: Yes    Pursuant to the emergency declaration under the [address] and the [facility], 1135 waiver authority and the [employer] Act, this Virtual Visit was conducted, with patient's (and/or legal guardian's) consent, to reduce the patient's risk of exposure to COVID-19 and provide necessary medical care.

## 2021-02-18 ENCOUNTER — VIRTUAL VISIT (OUTPATIENT)
Dept: ONCOLOGY | Age: 76
End: 2021-02-18
Payer: MEDICARE

## 2021-02-18 DIAGNOSIS — D47.3 ESSENTIAL THROMBOCYTOSIS (HCC): Primary | ICD-10-CM

## 2021-02-18 PROCEDURE — 99214 OFFICE O/P EST MOD 30 MIN: CPT | Performed by: INTERNAL MEDICINE

## 2021-02-18 PROCEDURE — G8399 PT W/DXA RESULTS DOCUMENT: HCPCS | Performed by: INTERNAL MEDICINE

## 2021-02-18 PROCEDURE — 1090F PRES/ABSN URINE INCON ASSESS: CPT | Performed by: INTERNAL MEDICINE

## 2021-02-18 PROCEDURE — G8417 CALC BMI ABV UP PARAM F/U: HCPCS | Performed by: INTERNAL MEDICINE

## 2021-02-18 PROCEDURE — G8756 NO BP MEASURE DOC: HCPCS | Performed by: INTERNAL MEDICINE

## 2021-02-18 PROCEDURE — G8427 DOCREV CUR MEDS BY ELIG CLIN: HCPCS | Performed by: INTERNAL MEDICINE

## 2021-02-18 PROCEDURE — G9717 DOC PT DX DEP/BP F/U NT REQ: HCPCS | Performed by: INTERNAL MEDICINE

## 2021-02-18 PROCEDURE — 1101F PT FALLS ASSESS-DOCD LE1/YR: CPT | Performed by: INTERNAL MEDICINE

## 2021-02-18 PROCEDURE — G8536 NO DOC ELDER MAL SCRN: HCPCS | Performed by: INTERNAL MEDICINE

## 2021-02-18 RX ORDER — HYDROXYUREA 500 MG/1
1000 CAPSULE ORAL DAILY
Qty: 60 CAP | Refills: 11 | Status: SHIPPED | OUTPATIENT
Start: 2021-02-18 | End: 2021-03-02 | Stop reason: SDUPTHER

## 2021-02-18 RX ORDER — AMLODIPINE BESYLATE 5 MG/1
TABLET ORAL
COMMUNITY
Start: 2021-02-05 | End: 2022-01-13 | Stop reason: ALTCHOICE

## 2021-02-18 RX ORDER — CLOPIDOGREL BISULFATE 75 MG/1
TABLET ORAL
COMMUNITY
Start: 2020-12-30

## 2021-02-18 RX ORDER — HYDROCHLOROTHIAZIDE 12.5 MG/1
TABLET ORAL
COMMUNITY
Start: 2020-12-30 | End: 2022-01-13 | Stop reason: ALTCHOICE

## 2021-02-18 NOTE — PATIENT INSTRUCTIONS
Thank you for participating in the virtual visit with Dr. Molina Gold. We will call you soon to schedule a follow up appointment with us in 1-2 months. If you do not hear from us, please call us at [phone] to schedule your appointment. Please use the enclosed lab slip to have your labs done before your next appointment.

## 2021-02-18 NOTE — PROGRESS NOTES
[facility] is a 66 y.o. female follow up for ET. 1. Have you been to the ER, urgent care clinic since your last visit? Hospitalized since your last visit? yes Stroke. 2. Have you seen or consulted any other health care providers outside of the [address] [place] [address] since your last visit? Include any pap smears or colon screening.  no

## 2021-03-02 DIAGNOSIS — D47.3 ESSENTIAL THROMBOCYTOSIS (HCC): ICD-10-CM

## 2021-03-02 RX ORDER — HYDROXYUREA 500 MG/1
1000 CAPSULE ORAL DAILY
Qty: 60 CAP | Refills: 11 | Status: SHIPPED | OUTPATIENT
Start: 2021-03-02 | End: 2022-03-08

## 2021-03-22 NOTE — PROGRESS NOTES
[facility] [place] [facility] [place] [address]  301 [facility], [facility]  Rosanne Channing: [phone]  F: [phone]      Reason for Visit:   Simone Torres is a 66 y.o. female who is seen for follow up of essential thrombocythemia. Treatment History:   · Presented with thrombocytosis  · JAK2 V617 mutation positive on 2/28/2019  · Bone marrow biopsy 4/9/2019: JAK2 positive myeloproliferative neoplasm, most consistent with essential thrombocythemia. · Essential Thrombocythemia  · Hydrea started 4/13/2019    History of Present Illness:   She reports seeing neurology regarding her CVA and is having additional testing. She had a CT of her neck yesterday and currently is wearing a heart monitor. She increased her hydrea to 1000mg PO daily after her last visit with me and she is tolerating this well so far, no side effects from this. She was advised to stop her ASA or plavix, and the decision was made to stop the ASA. She denies any bleeding while on the plavix. Her mother had polycythemia vera, required therapeutic phlebotomy. Review of systems was obtained and pertinent findings reviewed above. Past medical history, social history, family history, medications, and allergies are located in the electronic medical record. Physical Exam:     There were no vitals taken for this visit. General: alert, cooperative, no distress   Mental  status: normal mood, behavior, speech, dress, motor activity, and thought processes, able to follow commands   HENT: NCAT   Neck: no visualized mass   Resp: no respiratory distress   Neuro: no gross deficits   Skin: no discoloration or lesions of concern on visible areas   Psychiatric: normal affect, consistent with stated mood, no evidence of hallucinations       Due to this being a TeleHealth evaluation (During VXWDZ-61 public health emergency), many elements of the physical examination are unable to be assessed.   Evaluation of the following organ systems was limited: Vitals/Constitutional/EENT/Resp/CV/GI//MS/Neuro/Skin/Heme-Lymph-Imm. Results:     Lab Results   Component Value Date/Time    WBC 7.1 03/23/2021 10:53 AM    HGB 13.8 03/23/2021 10:53 AM    HCT 43.0 03/23/2021 10:53 AM    PLATELET 809 70/52/0963 10:53 AM    .2 (H) 03/23/2021 10:53 AM    ABS. NEUTROPHILS 4.1 03/23/2021 10:53 AM     Lab Results   Component Value Date/Time    Sodium 138 03/23/2021 10:53 AM    Potassium 4.6 03/23/2021 10:53 AM    Chloride 104 03/23/2021 10:53 AM    CO2 30 03/23/2021 10:53 AM    Glucose 85 03/23/2021 10:53 AM    BUN 27 (H) 03/23/2021 10:53 AM    Creatinine 0.72 03/23/2021 10:53 AM    GFR est AA >60 03/23/2021 10:53 AM    GFR est non-AA >60 03/23/2021 10:53 AM    Calcium 9.1 03/23/2021 10:53 AM    Glucose (POC) 126 (H) 08/22/2012 06:54 AM     Lab Results   Component Value Date/Time    Bilirubin, total 0.3 03/23/2021 10:53 AM    ALT (SGPT) 58 03/23/2021 10:53 AM    Alk.  phosphatase 96 03/23/2021 10:53 AM    Protein, total 7.0 03/23/2021 10:53 AM    Albumin 3.9 03/23/2021 10:53 AM    Globulin 3.1 03/23/2021 10:53 AM     Lab Results   Component Value Date/Time    Iron % saturation 29 06/14/2018 10:25 AM    TIBC 312 06/14/2018 10:25 AM    Ferritin 178 (H) 06/14/2018 10:25 AM    Vitamin B12 1,688 (H) 06/14/2018 10:25 AM    Folate 18.5 06/14/2018 10:25 AM    Erythropoietin 5.1 02/28/2019 12:54 PM     02/28/2019 12:54 PM    Sed rate (ESR) 3 02/28/2019 12:54 PM    C-Reactive protein 0.50 08/02/2012 11:47 AM    C-Reactive Protein, Qt 1.1 02/28/2019 12:54 PM    TSH 0.758 06/14/2018 10:25 AM    TSH 3.02 08/02/2012 11:47 AM    Lipase 92 (H) 02/28/2019 12:54 PM     PLATELET   Date Value   03/23/2021 303 K/uL   02/15/2021 440 K/uL (H)   11/16/2020 433 K/uL (H)   08/17/2020 415 K/uL (H)   05/14/2020 425 x10E3/uL   02/14/2020 432 K/uL (H)   12/20/2019 422 K/uL (H)   11/14/2019 458 x10E3/uL (H)   08/13/2019 434 x10E3/uL   07/11/2019 434 x10E3/uL   06/11/2019 465 x10E3/uL (H)   05/09/2019 480 x10E3/uL (H)   03/28/2019 491 K/uL (H)   02/28/2019 547 x10E3/uL (H)   06/14/2018 458 x10E3/uL (H)   08/21/2012 321 K/uL   08/02/2012 298 K/uL       9/20/2018  WBC: 6.1  HGB: 14.0  PLT: 474  Smear: Platelets vary in size, otherwise unremarkable  Creatinine: 0.68  AST, ALT, ALP, TBili: wnl  Ferritin: 122  Iron saturation: 19%    2/20/2019 (from PCP)  WBC: 7.3  HGB: 14.3  PLT: 498    2/20/2019 (patient supplied)  WBC: 7.8  HGB: 14.2  HCT: 43.7  PLT: 514  B12: 1559  Folate: 16.7  Iron sat: 25%  Ferritin: 125  Copper: 94  Zinc: 90  TSH: 1.62    JAK2 2/28/2019: POSITIVE for the detection of the V617F mutation. Erythropoietin 5.1      Bone marrow biopsy 4/9/2019: estimated normocellular marrow for age with maturing trilineage hematopoiesis, mildly increased megakaryocytes and no increase in blasts. Preliminary comment:  Findings are of a JAK2 positive myeloproliferative neoplasm, most consistent with essential thrombocythemia. Assessment:   1) Essential Thrombocythemia  JAK2+  Diagnosis confirmed on bone marrow biopsy. She has an IPSET prognosis score of 2, which is intermediate risk and translates to a median overall survival of 17 years to 24.5 years, depending on the study. Her IPSET thrombosis score is high risk based on age and JAK2 mutation, with a 3.6%/year risk of thrombosis. She began hydrea therapy 4/2019, with goal of reducing her platelets into the normal range to reduce her risk of thrombotic complications. Dose increased to 1000mg PO daily in 2/2021 when PLT lianne to 440 and after developing a CVA. She is tolerating hydrea well at the increased dose. Platelets have come down nicely to 303, and HCT has dropped back down as well. We will continue with therapy at current dose. The patient will be seen every 3 months while on therapy, and labs will be monitored to assess for toxicity from therapy.     Interestingly, her mother had PV, so there may be a familial component to her illness. 2) Hot flashes  Chronic. Uncertain etiology. Much improved lately, currently on effexor which may be helping. 3) Mouth sores  Intermittent, possibly from hydrea. Overall improved. Have discussed salt water rinses, OTC oragel PRN    4) H/o CVA  On plavix. Undergoing neurology evaluation. 5) Emotional Well Being  No psychosocial concerns identified today. Patient has adequate support. Plan:     · Continue Hydroxyurea 1000mg daily  · Labs in 6 weeks and 12 weeks: CBC and CMP (She prefers [employer])  · Return to see me in 3 months    [id number] Bradshaw for video visits. Signed By: Isa Reich MD      The patient was evaluated through a synchronous (real-time) audio-video encounter. The patient (or guardian if applicable) is aware that this is a billable service. Verbal consent to proceed has been obtained within the past 12 months. The visit was conducted pursuant to the emergency declaration under the [facility], [address] [place] [address] authority and the [employer] General Act. Patient identification was verified, and a caregiver was present when appropriate. The patient was located in a state where the provider was credentialed to provide care.

## 2021-03-25 ENCOUNTER — VIRTUAL VISIT (OUTPATIENT)
Dept: ONCOLOGY | Age: 76
End: 2021-03-25
Payer: MEDICARE

## 2021-03-25 DIAGNOSIS — D47.3 ESSENTIAL THROMBOCYTOSIS (HCC): Primary | ICD-10-CM

## 2021-03-25 PROCEDURE — G8421 BMI NOT CALCULATED: HCPCS | Performed by: INTERNAL MEDICINE

## 2021-03-25 PROCEDURE — G9717 DOC PT DX DEP/BP F/U NT REQ: HCPCS | Performed by: INTERNAL MEDICINE

## 2021-03-25 PROCEDURE — 99214 OFFICE O/P EST MOD 30 MIN: CPT | Performed by: INTERNAL MEDICINE

## 2021-03-25 PROCEDURE — G8536 NO DOC ELDER MAL SCRN: HCPCS | Performed by: INTERNAL MEDICINE

## 2021-03-25 PROCEDURE — G8756 NO BP MEASURE DOC: HCPCS | Performed by: INTERNAL MEDICINE

## 2021-03-25 PROCEDURE — G8399 PT W/DXA RESULTS DOCUMENT: HCPCS | Performed by: INTERNAL MEDICINE

## 2021-03-25 PROCEDURE — G0463 HOSPITAL OUTPT CLINIC VISIT: HCPCS | Performed by: INTERNAL MEDICINE

## 2021-03-25 PROCEDURE — 1101F PT FALLS ASSESS-DOCD LE1/YR: CPT | Performed by: INTERNAL MEDICINE

## 2021-03-25 PROCEDURE — 1090F PRES/ABSN URINE INCON ASSESS: CPT | Performed by: INTERNAL MEDICINE

## 2021-03-25 PROCEDURE — G8427 DOCREV CUR MEDS BY ELIG CLIN: HCPCS | Performed by: INTERNAL MEDICINE

## 2021-03-25 RX ORDER — ROSUVASTATIN CALCIUM 10 MG/1
10 TABLET, COATED ORAL
COMMUNITY

## 2021-03-25 RX ORDER — OLMESARTAN MEDOXOMIL 40 MG/1
TABLET ORAL DAILY
COMMUNITY

## 2021-03-25 NOTE — PROGRESS NOTES
Opal Botello is a 66 y.o. female follow up for ET. 1. Have you been to the ER, urgent care clinic since your last visit? Hospitalized since your last visit?no     2. Have you seen or consulted any other health care providers outside of the [address] [place] [address] since your last visit? Include any pap smears or colon screening.  No

## 2021-03-25 NOTE — PATIENT INSTRUCTIONS
Thank you for participating in the virtual visit with Dr. Gerardo Antonio. We will call you soon to schedule a follow up appointment with us in 3 months. If you do not hear from us, please call us at [phone] to schedule your appointment. Please use the enclosed lab slip to have your labs done in 6 weeks and 12 weeks before your next appointment.

## 2021-06-14 ENCOUNTER — TELEPHONE (OUTPATIENT)
Dept: ONCOLOGY | Age: 76
End: 2021-06-14

## 2021-06-14 NOTE — TELEPHONE ENCOUNTER
[address] at [facility]  [phone]    06/14/21- Phone call placed to pt to remind pt to have labs drawn prior to her follow up appointment with .  left for patient.

## 2021-06-21 ENCOUNTER — TELEPHONE (OUTPATIENT)
Dept: ONCOLOGY | Age: 76
End: 2021-06-21

## 2021-06-21 NOTE — TELEPHONE ENCOUNTER
[address] at [place]  [phone]    06/21/21- Phone call placed to pt to remind pt to have labs drawn prior to her follow up appointment with . Pt verbalized understanding.

## 2021-06-24 NOTE — PROGRESS NOTES
[facility] [place] [facility] [place] [address]  301 [facility], 2[address] [facility] 1007 [facility]  Marisela Linen: [phone]  F: [phone]      Reason for Visit:   Hayder Calderón is a 64 y.o. female who is seen for follow up of essential thrombocythemia. Treatment History:   · Presented with thrombocytosis  · JAK2 V617 mutation positive on 2/28/2019  · Bone marrow biopsy 4/9/2019: JAK2 positive myeloproliferative neoplasm, most consistent with essential thrombocythemia. · Essential Thrombocythemia  · Hydrea started 4/13/2019    History of Present Illness:   She reports resolution of her hot flashes over the past 6 weeks. She remains on effexor. PCP tried her on ariprazole recently as well, but she has stopped this due to weight gain. She remains on hydrea 1000mg PO daily. On plavix. Her mother had polycythemia vera, required therapeutic phlebotomy. Review of systems was obtained and pertinent findings reviewed above. Past medical history, social history, family history, medications, and allergies are located in the electronic medical record. Physical Exam:     There were no vitals taken for this visit. General: alert, cooperative, no distress   Mental  status: normal mood, behavior, speech, dress, motor activity, and thought processes, able to follow commands   HENT: NCAT   Neck: no visualized mass   Resp: no respiratory distress   Neuro: no gross deficits   Skin: no discoloration or lesions of concern on visible areas   Psychiatric: normal affect, consistent with stated mood, no evidence of hallucinations       Due to this being a TeleHealth evaluation (During WBSBB-15 public health emergency), many elements of the physical examination are unable to be assessed. Evaluation of the following organ systems was limited: Vitals/Constitutional/EENT/Resp/CV/GI//MS/Neuro/Skin/Heme-Lymph-Imm.         Results:     Lab Results   Component Value Date/Time    WBC 7.7 06/23/2021 04:18 PM    HGB 13.9 06/23/2021 04:18 PM    HCT 42.6 06/23/2021 04:18 PM    PLATELET 964 23/45/0935 04:18 PM    .5 (H) 06/23/2021 04:18 PM    ABS. NEUTROPHILS 4.1 06/23/2021 04:18 PM     Lab Results   Component Value Date/Time    Sodium 138 06/23/2021 04:18 PM    Potassium 4.8 06/23/2021 04:18 PM    Chloride 105 06/23/2021 04:18 PM    CO2 29 06/23/2021 04:18 PM    Glucose 95 06/23/2021 04:18 PM    BUN 24 (H) 06/23/2021 04:18 PM    Creatinine 0.80 06/23/2021 04:18 PM    GFR est AA >60 06/23/2021 04:18 PM    GFR est non-AA >60 06/23/2021 04:18 PM    Calcium 9.1 06/23/2021 04:18 PM    Glucose (POC) 126 (H) 08/22/2012 06:54 AM     Lab Results   Component Value Date/Time    Bilirubin, total 0.4 06/23/2021 04:18 PM    ALT (SGPT) 93 (H) 06/23/2021 04:18 PM    Alk.  phosphatase 97 06/23/2021 04:18 PM    Protein, total 6.8 06/23/2021 04:18 PM    Albumin 3.8 06/23/2021 04:18 PM    Globulin 3.0 06/23/2021 04:18 PM     Lab Results   Component Value Date/Time    Iron % saturation 29 06/14/2018 10:25 AM    TIBC 312 06/14/2018 10:25 AM    Ferritin 178 (H) 06/14/2018 10:25 AM    Vitamin B12 1,688 (H) 06/14/2018 10:25 AM    Folate 18.5 06/14/2018 10:25 AM    Erythropoietin 5.1 02/28/2019 12:54 PM     02/28/2019 12:54 PM    Sed rate (ESR) 3 02/28/2019 12:54 PM    C-Reactive protein 0.50 08/02/2012 11:47 AM    C-Reactive Protein, Qt 1.1 02/28/2019 12:54 PM    TSH 0.758 06/14/2018 10:25 AM    TSH 3.02 08/02/2012 11:47 AM    Lipase 92 (H) 02/28/2019 12:54 PM     PLATELET   Date Value   06/23/2021 273 K/uL   04/26/2021 248 K/uL   03/23/2021 303 K/uL   02/15/2021 440 K/uL (H)   11/16/2020 433 K/uL (H)   08/17/2020 415 K/uL (H)   05/14/2020 425 x10E3/uL   02/14/2020 432 K/uL (H)   12/20/2019 422 K/uL (H)   11/14/2019 458 x10E3/uL (H)   08/13/2019 434 x10E3/uL   07/11/2019 434 x10E3/uL   06/11/2019 465 x10E3/uL (H)   05/09/2019 480 x10E3/uL (H)   03/28/2019 491 K/uL (H)   02/28/2019 547 x10E3/uL (H)   06/14/2018 458 x10E3/uL (H)   08/21/2012 321 K/uL   08/02/2012 298 K/uL       9/20/2018  WBC: 6.1  HGB: 14.0  PLT: 474  Smear: Platelets vary in size, otherwise unremarkable  Creatinine: 0.68  AST, ALT, ALP, TBili: wnl  Ferritin: 122  Iron saturation: 19%    2/20/2019 (from PCP)  WBC: 7.3  HGB: 14.3  PLT: 498    2/20/2019 (patient supplied)  WBC: 7.8  HGB: 14.2  HCT: 43.7  PLT: 514  B12: 1559  Folate: 16.7  Iron sat: 25%  Ferritin: 125  Copper: 94  Zinc: 90  TSH: 1.62    JAK2 2/28/2019: POSITIVE for the detection of the V617F mutation. Bone marrow biopsy 4/9/2019: estimated normocellular marrow for age with maturing trilineage hematopoiesis, mildly increased megakaryocytes and no increase in blasts. Preliminary comment:  Findings are of a JAK2 positive myeloproliferative neoplasm, most consistent with essential thrombocythemia. Assessment:   1) Essential Thrombocythemia  JAK2+  Diagnosis confirmed on bone marrow biopsy. She had an IPSET prognosis score of 2, which is intermediate risk and translates to a median overall survival of 17 years to 24.5 years, depending on the study. Her IPSET thrombosis score is high risk based on age and JAK2 mutation, with a 3.6%/year risk of thrombosis. She began hydrea therapy 4/2019, with goal of reducing her platelets into the normal range to reduce her risk of thrombotic complications. Dose increased to 1000mg PO daily in 2/2021 when PLT lianne to 440 and after developing a CVA. She is tolerating hydrea well at the current dose. Her labs are at goal.  We will continue with therapy. The patient will be seen every 3 months while on therapy, and labs will be monitored to assess for toxicity from therapy. Interestingly, her mother had PV, so there may be a familial component to her illness. 2) Hot flashes  Resolved recently, unclear why    3) Mouth sores  Intermittent, possibly from hydrea. Overall improved. Have discussed salt water rinses, OTC oragel PRN    4) H/o CVA  On plavix.   Undergoing neurology evaluation. 5) Elevated AST, ALT  Mild elevation. New. Uncertain etiology. Normal bilirubin. Monitor for now and consider imaging if this worsens. 5) Emotional Well Being  No psychosocial concerns identified today. Patient has adequate support. Plan:     · Continue Hydroxyurea 1000mg daily  · Labs in 3 months: CBC and CMP (She prefers [employer])  · Return to see me in 3 months    Haylie Walters for video visits. Signed By: Claude Raw, MD      The patient was evaluated through a synchronous (real-time) audio-video encounter. The patient (or guardian if applicable) is aware that this is a billable service. Verbal consent to proceed has been obtained within the past 12 months. The visit was conducted pursuant to the emergency declaration under the [facility], [address] [place] [address] waiver authority and the [employer] General Act. Patient identification was verified, and a caregiver was present when appropriate. The patient was located in a state where the provider was credentialed to provide care.

## 2021-06-29 ENCOUNTER — VIRTUAL VISIT (OUTPATIENT)
Dept: ONCOLOGY | Age: 76
End: 2021-06-29
Payer: MEDICARE

## 2021-06-29 DIAGNOSIS — D47.3 ESSENTIAL THROMBOCYTOSIS (HCC): Primary | ICD-10-CM

## 2021-06-29 PROCEDURE — G0463 HOSPITAL OUTPT CLINIC VISIT: HCPCS | Performed by: INTERNAL MEDICINE

## 2021-06-29 PROCEDURE — G8421 BMI NOT CALCULATED: HCPCS | Performed by: INTERNAL MEDICINE

## 2021-06-29 PROCEDURE — G8399 PT W/DXA RESULTS DOCUMENT: HCPCS | Performed by: INTERNAL MEDICINE

## 2021-06-29 PROCEDURE — G8536 NO DOC ELDER MAL SCRN: HCPCS | Performed by: INTERNAL MEDICINE

## 2021-06-29 PROCEDURE — G8756 NO BP MEASURE DOC: HCPCS | Performed by: INTERNAL MEDICINE

## 2021-06-29 PROCEDURE — 1090F PRES/ABSN URINE INCON ASSESS: CPT | Performed by: INTERNAL MEDICINE

## 2021-06-29 PROCEDURE — 1101F PT FALLS ASSESS-DOCD LE1/YR: CPT | Performed by: INTERNAL MEDICINE

## 2021-06-29 PROCEDURE — 99214 OFFICE O/P EST MOD 30 MIN: CPT | Performed by: INTERNAL MEDICINE

## 2021-06-29 PROCEDURE — G9717 DOC PT DX DEP/BP F/U NT REQ: HCPCS | Performed by: INTERNAL MEDICINE

## 2021-06-29 PROCEDURE — G8427 DOCREV CUR MEDS BY ELIG CLIN: HCPCS | Performed by: INTERNAL MEDICINE

## 2021-06-29 NOTE — PATIENT INSTRUCTIONS
Thank you for participating in the virtual visit with Dr. Yao Hawkins. We will call you soon to schedule a follow up appointment with us in 3 months. If you do not hear from us, please call us at [phone] to schedule your appointment. Please use the enclosed lab slip to have your labs done before your next appointment.

## 2021-09-20 ENCOUNTER — TELEPHONE (OUTPATIENT)
Dept: ONCOLOGY | Age: 76
End: 2021-09-20

## 2021-09-20 NOTE — TELEPHONE ENCOUNTER
[employer] at [facility]  [phone]    09/20/21- Phone call placed to pt to remind pt to have labs drawn prior to her follow up appointment with . Pt verbalized understanding.

## 2021-09-27 NOTE — PROGRESS NOTES
[facility] [place] at [place] [address]  301 [facility], [facility]9 [address]  Marce Faustne: [phone]  F: [phone]      Reason for Visit:   Milad Moreira is a 64 y.o. female who is seen for follow up of essential thrombocythemia. Treatment History:   · Presented with thrombocytosis  · JAK2 V617 mutation positive on 2/28/2019  · Bone marrow biopsy 4/9/2019: JAK2 positive myeloproliferative neoplasm, most consistent with essential thrombocythemia. · Essential Thrombocythemia  · Hydrea started 4/13/2019    History of Present Illness:   She reports not feeling well since her stroke in 12/2020. Lots of fatigue, spending a lot of time in her recliner. Weakness and lethargy seem worse in the past few months. Doesn't feel like doing anything. Not cooking or cleaning,  doing all the household chores. Gaining weight. Seeing a therapist about depression. On effexor, dose recently increased, but this hasn't helped. Seeing her PCP, Dr. Travis Almaguer, later this morning to discuss further. She remains on hydrea 1000mg daily. Her mother had polycythemia vera, required therapeutic phlebotomy. Review of systems was obtained and pertinent findings reviewed above. Past medical history, social history, family history, medications, and allergies are located in the electronic medical record. Physical Exam:     Visit Vitals  BP (!) 118/57 (BP 1 Location: Right arm, BP Patient Position: Sitting, BP Cuff Size: Large adult) Comment: .    Pulse 75   Temp 96.9 °F (36.1 °C) (Temporal)   Resp 16   Ht 5' 4\" (1.626 m)   Wt 175 lb (79.4 kg)   SpO2 95%   BMI 30.04 kg/m²     General: no distress  Respiratory: normal respiratory effort  CV: no peripheral edema  Skin: no rashes; no ecchymoses; no petechiae      Results:     Lab Results   Component Value Date/Time    WBC 5.5 09/28/2021 11:38 AM    HGB 14.0 09/28/2021 11:38 AM    HCT 41.3 09/28/2021 11:38 AM    PLATELET 296 77/60/4330 11:38 AM .4 (H) 09/28/2021 11:38 AM    ABS. NEUTROPHILS 3.1 09/28/2021 11:38 AM     Lab Results   Component Value Date/Time    Sodium 139 09/28/2021 11:38 AM    Potassium 4.2 09/28/2021 11:38 AM    Chloride 104 09/28/2021 11:38 AM    CO2 29 09/28/2021 11:38 AM    Glucose 82 09/28/2021 11:38 AM    BUN 13 09/28/2021 11:38 AM    Creatinine 0.78 09/28/2021 11:38 AM    GFR est AA >60 09/28/2021 11:38 AM    GFR est non-AA >60 09/28/2021 11:38 AM    Calcium 9.2 09/28/2021 11:38 AM    Glucose (POC) 126 (H) 08/22/2012 06:54 AM     Lab Results   Component Value Date/Time    Bilirubin, total 0.4 09/28/2021 11:38 AM    ALT (SGPT) 73 09/28/2021 11:38 AM    Alk.  phosphatase 90 09/28/2021 11:38 AM    Protein, total 7.0 09/28/2021 11:38 AM    Albumin 3.8 09/28/2021 11:38 AM    Globulin 3.2 09/28/2021 11:38 AM     Lab Results   Component Value Date/Time    Iron % saturation 29 06/14/2018 10:25 AM    TIBC 312 06/14/2018 10:25 AM    Ferritin 178 (H) 06/14/2018 10:25 AM    Vitamin B12 1,688 (H) 06/14/2018 10:25 AM    Folate 18.5 06/14/2018 10:25 AM    Erythropoietin 5.1 02/28/2019 12:54 PM     02/28/2019 12:54 PM    Sed rate (ESR) 3 02/28/2019 12:54 PM    C-Reactive protein 0.50 08/02/2012 11:47 AM    C-Reactive Protein, Qt 1.1 02/28/2019 12:54 PM    TSH 0.758 06/14/2018 10:25 AM    TSH 3.02 08/02/2012 11:47 AM    Lipase 92 (H) 02/28/2019 12:54 PM     PLATELET   Date Value   09/28/2021 206 K/uL   06/23/2021 273 K/uL   04/26/2021 248 K/uL   03/23/2021 303 K/uL   02/15/2021 440 K/uL (H)   11/16/2020 433 K/uL (H)   08/17/2020 415 K/uL (H)   05/14/2020 425 x10E3/uL   02/14/2020 432 K/uL (H)   12/20/2019 422 K/uL (H)   11/14/2019 458 x10E3/uL (H)   08/13/2019 434 x10E3/uL   07/11/2019 434 x10E3/uL   06/11/2019 465 x10E3/uL (H)   05/09/2019 480 x10E3/uL (H)   03/28/2019 491 K/uL (H)   02/28/2019 547 x10E3/uL (H)   06/14/2018 458 x10E3/uL (H)   08/21/2012 321 K/uL   08/02/2012 298 K/uL       9/20/2018  WBC: 6.1  HGB: 14.0  PLT: 474  Smear: Platelets vary in size, otherwise unremarkable  Creatinine: 0.68  AST, ALT, ALP, TBili: wnl  Ferritin: 122  Iron saturation: 19%    2/20/2019 (from PCP)  WBC: 7.3  HGB: 14.3  PLT: 498    2/20/2019 (patient supplied)  WBC: 7.8  HGB: 14.2  HCT: 43.7  PLT: 514  B12: 1559  Folate: 16.7  Iron sat: 25%  Ferritin: 125  Copper: 94  Zinc: 90  TSH: 1.62    JAK2 2/28/2019: POSITIVE for the detection of the V617F mutation. Bone marrow biopsy 4/9/2019: estimated normocellular marrow for age with maturing trilineage hematopoiesis, mildly increased megakaryocytes and no increase in blasts. Preliminary comment:  Findings are of a JAK2 positive myeloproliferative neoplasm, most consistent with essential thrombocythemia. Assessment:   1) Essential Thrombocythemia  JAK2+  Diagnosis confirmed on bone marrow biopsy. She had an IPSET prognosis score of 2, which is intermediate risk and translates to a median overall survival of 17 years to 24.5 years, depending on the study. Her IPSET thrombosis score is high risk based on age and JAK2 mutation, with a 3.6%/year risk of thrombosis. She began hydrea therapy 4/2019, with goal of reducing her platelets into the normal range to reduce her risk of thrombotic complications. Dose increased to 1000mg PO daily in 2/2021 when PLT lianne to 440 and after developing a CVA. Her current labs remain at goal.  She continues to tolerate hydrea well without significant toxicity, so we will proceed with treatment. The patient will be seen every 3 months while on therapy, and labs will be monitored to assess for toxicity from therapy. Interestingly, her mother had PV, so there may be a familial component to her illness. 2) Mouth sores  Intermittent, possibly from hydrea. Overall improved. Have discussed salt water rinses, OTC oragel PRN    3) H/o CVA  On plavix. Seen by neurology. 5) Elevated AST, ALT  Mild elevation noted on labs from 6/2021.   Improved on repeat labs from 9/2021. Continue to monitor and consider imaging if this worsens. 6) Fatigue  Uncertain etiology. Labs look ok. Possibly from hydrea? Seems much more significant than I would expect from this drug. Possibly depression? Seeing her PCP today for additional evaluation. 7) Emotional Well Being  No psychosocial concerns identified today. Patient has adequate support. Plan:     · Continue Hydroxyurea 1000mg daily  · Labs in 3 months: CBC and CMP (She prefers [employer])  · Return to see me in 3 months    Luca Ram for video visits.     Signed By: Jamil Glass MD

## 2021-09-30 ENCOUNTER — OFFICE VISIT (OUTPATIENT)
Dept: ONCOLOGY | Age: 76
End: 2021-09-30
Payer: MEDICARE

## 2021-09-30 VITALS
HEART RATE: 75 BPM | TEMPERATURE: 96.9 F | DIASTOLIC BLOOD PRESSURE: 57 MMHG | BODY MASS INDEX: 29.88 KG/M2 | WEIGHT: 175 LBS | RESPIRATION RATE: 16 BRPM | OXYGEN SATURATION: 95 % | HEIGHT: 64 IN | SYSTOLIC BLOOD PRESSURE: 118 MMHG

## 2021-09-30 DIAGNOSIS — D47.3 ESSENTIAL THROMBOCYTOSIS (HCC): Primary | ICD-10-CM

## 2021-09-30 PROCEDURE — G8419 CALC BMI OUT NRM PARAM NOF/U: HCPCS | Performed by: INTERNAL MEDICINE

## 2021-09-30 PROCEDURE — G0463 HOSPITAL OUTPT CLINIC VISIT: HCPCS | Performed by: INTERNAL MEDICINE

## 2021-09-30 PROCEDURE — G8752 SYS BP LESS 140: HCPCS | Performed by: INTERNAL MEDICINE

## 2021-09-30 PROCEDURE — G8427 DOCREV CUR MEDS BY ELIG CLIN: HCPCS | Performed by: INTERNAL MEDICINE

## 2021-09-30 PROCEDURE — G8536 NO DOC ELDER MAL SCRN: HCPCS | Performed by: INTERNAL MEDICINE

## 2021-09-30 PROCEDURE — G8754 DIAS BP LESS 90: HCPCS | Performed by: INTERNAL MEDICINE

## 2021-09-30 PROCEDURE — 99214 OFFICE O/P EST MOD 30 MIN: CPT | Performed by: INTERNAL MEDICINE

## 2021-09-30 PROCEDURE — 1090F PRES/ABSN URINE INCON ASSESS: CPT | Performed by: INTERNAL MEDICINE

## 2021-09-30 PROCEDURE — G8399 PT W/DXA RESULTS DOCUMENT: HCPCS | Performed by: INTERNAL MEDICINE

## 2021-09-30 PROCEDURE — G9717 DOC PT DX DEP/BP F/U NT REQ: HCPCS | Performed by: INTERNAL MEDICINE

## 2021-09-30 PROCEDURE — 1101F PT FALLS ASSESS-DOCD LE1/YR: CPT | Performed by: INTERNAL MEDICINE

## 2021-09-30 RX ORDER — SEMAGLUTIDE 1.34 MG/ML
INJECTION, SOLUTION SUBCUTANEOUS
COMMUNITY
Start: 2021-09-27 | End: 2022-08-10 | Stop reason: ALTCHOICE

## 2021-09-30 NOTE — PROGRESS NOTES
Sai Flores is a 64 y.o. female follow up for ET. 1. Have you been to the ER, urgent care clinic since your last visit? Hospitalized since your last visit?no     2. Have you seen or consulted any other health care providers outside of the [address] [place] [address] since your last visit? Include any pap smears or colon screening.  no

## 2022-01-10 NOTE — PROGRESS NOTES
[facility] at [address] [facility]  [place] : [phone]  F: [phone]      Reason for Visit:   Vinnie Ramirez is a 64 y.o. female who is seen for follow up of essential thrombocythemia. Treatment History:   · Presented with thrombocytosis  · JAK2 V617 mutation positive on 2/28/2019  · Bone marrow biopsy 4/9/2019: JAK2 positive myeloproliferative neoplasm, most consistent with essential thrombocythemia. · Essential Thrombocythemia  · Hydrea started 4/13/2019    History of Present Illness:   She is feeling fair, not great. She doesn't feel that she has improved much since her CVA. Continues with significant fatigue, spends her days in a recliner. She wonders if this is from depression. She has been seeing a psychologist, but she is not currently on any medication for this. She remains on hydrea 1000mg daily. She is working on weight loss. She has lost 26 pounds in the past 6 month or so. Her mother had polycythemia vera, required therapeutic phlebotomy. Review of systems was obtained and pertinent findings reviewed above. Past medical history, social history, family history, medications, and allergies are located in the electronic medical record. Physical Exam:     Visit Vitals  /67 (BP 1 Location: Right arm, BP Patient Position: Sitting) Comment: . Pulse 78   Temp 96.9 °F (36.1 °C) (Temporal)   Resp 18   Ht 5' 4\" (1.626 m)   Wt 161 lb (73 kg)   SpO2 96%   BMI 27.64 kg/m²     General: no distress  Respiratory: normal respiratory effort  CV: no peripheral edema  Skin: no rashes; no ecchymoses; no petechiae      Results:     Lab Results   Component Value Date/Time    WBC 3.8 01/10/2022 12:24 PM    HGB 12.4 01/10/2022 12:24 PM    HCT 37.8 01/10/2022 12:24 PM    PLATELET 570 83/29/6085 12:24 PM    .5 (H) 01/10/2022 12:24 PM    ABS.  NEUTROPHILS 1.9 01/10/2022 12:24 PM     Lab Results   Component Value Date/Time    Sodium 139 01/10/2022 12:24 PM    Potassium 4.3 01/10/2022 12:24 PM    Chloride 105 01/10/2022 12:24 PM    CO2 29 01/10/2022 12:24 PM    Glucose 86 01/10/2022 12:24 PM    BUN 15 01/10/2022 12:24 PM    Creatinine 0.68 01/10/2022 12:24 PM    GFR est AA >60 01/10/2022 12:24 PM    GFR est non-AA >60 01/10/2022 12:24 PM    Calcium 9.2 01/10/2022 12:24 PM    Glucose (POC) 126 (H) 08/22/2012 06:54 AM     Lab Results   Component Value Date/Time    Bilirubin, total 0.4 01/10/2022 12:24 PM    ALT (SGPT) 57 01/10/2022 12:24 PM    Alk.  phosphatase 73 01/10/2022 12:24 PM    Protein, total 6.6 01/10/2022 12:24 PM    Albumin 3.7 01/10/2022 12:24 PM    Globulin 2.9 01/10/2022 12:24 PM     Lab Results   Component Value Date/Time    Iron % saturation 29 06/14/2018 10:25 AM    TIBC 312 06/14/2018 10:25 AM    Ferritin 178 (H) 06/14/2018 10:25 AM    Vitamin B12 1,688 (H) 06/14/2018 10:25 AM    Folate 18.5 06/14/2018 10:25 AM    Erythropoietin 5.1 02/28/2019 12:54 PM     02/28/2019 12:54 PM    Sed rate (ESR) 3 02/28/2019 12:54 PM    C-Reactive protein 0.50 08/02/2012 11:47 AM    C-Reactive Protein, Qt 1.1 02/28/2019 12:54 PM    TSH 0.758 06/14/2018 10:25 AM    TSH 3.02 08/02/2012 11:47 AM    Lipase 92 (H) 02/28/2019 12:54 PM     PLATELET   Date Value   01/10/2022 188 K/uL   09/28/2021 206 K/uL   06/23/2021 273 K/uL   04/26/2021 248 K/uL   03/23/2021 303 K/uL   02/15/2021 440 K/uL (H)   11/16/2020 433 K/uL (H)   08/17/2020 415 K/uL (H)   05/14/2020 425 x10E3/uL   02/14/2020 432 K/uL (H)   12/20/2019 422 K/uL (H)   11/14/2019 458 x10E3/uL (H)   08/13/2019 434 x10E3/uL   07/11/2019 434 x10E3/uL   06/11/2019 465 x10E3/uL (H)   05/09/2019 480 x10E3/uL (H)   03/28/2019 491 K/uL (H)   02/28/2019 547 x10E3/uL (H)   06/14/2018 458 x10E3/uL (H)   08/21/2012 321 K/uL   08/02/2012 298 K/uL       9/20/2018  WBC: 6.1  HGB: 14.0  PLT: 474  Smear: Platelets vary in size, otherwise unremarkable  Creatinine: 0.68  AST, ALT, ALP, TBili: wnl  Ferritin: 122  Iron saturation: 19%    2/20/2019 (from PCP)  WBC: 7.3  HGB: 14.3  PLT: 498    2/20/2019 (patient supplied)  WBC: 7.8  HGB: 14.2  HCT: 43.7  PLT: 514  B12: 1559  Folate: 16.7  Iron sat: 25%  Ferritin: 125  Copper: 94  Zinc: 90  TSH: 1.62    JAK2 2/28/2019: POSITIVE for the detection of the V617F mutation. Bone marrow biopsy 4/9/2019: estimated normocellular marrow for age with maturing trilineage hematopoiesis, mildly increased megakaryocytes and no increase in blasts. Preliminary comment:  Findings are of a JAK2 positive myeloproliferative neoplasm, most consistent with essential thrombocythemia. Assessment:   1) Essential Thrombocythemia  JAK2+  Diagnosis confirmed on bone marrow biopsy. She had an IPSET prognosis score of 2, which is intermediate risk and translates to a median overall survival of 17 years to 24.5 years, depending on the study. Her IPSET thrombosis score is high risk based on age and JAK2 mutation, with a 3.6%/year risk of thrombosis. She began hydrea therapy 4/2019, with goal of reducing her platelets into the normal range to reduce her risk of thrombotic complications. Dose increased to 1000mg PO daily in 2/2021 when PLT lianne to 440 and after developing a CVA. Labs remain at goal on current dose of therapy. She is tolerating treatment reasonably well. Some fatigue discussed below, but I'm not sure if this is related to the medication. Continue with therapy for now. The patient will be seen every 3 months while on therapy, and labs will be monitored to assess for toxicity from therapy. Interestingly, her mother had PV, so there may be a familial component to her illness. 2) Mouth sores  Intermittent, possibly from hydrea. Overall improved. Have discussed salt water rinses, OTC oragel PRN    3) H/o CVA  On plavix. Seen by neurology. 5) Elevated AST, ALT  Mild elevation noted on labs from 6/2021. Resolved on recent labs.     6) Fatigue  Uncertain etiology. Labs look ok. Possibly from hydrea? Seems much more significant than I would expect from this drug. Possibly depression? She is seeing a therapist.  I wonder if she would benefit from medication for depression and I asked her to discuss this with her PCP. She is also planning to see a cardiologist to see if a cardiac issue could be contributing. 7) Emotional Well Being  No psychosocial concerns identified today. Patient has adequate support. Plan:     · Continue Hydroxyurea 1000mg daily  · Labs in 3 months: CBC and CMP (She prefers [employer])  · Return to see me in 3 months    Ritesh Mcdonnellbindxiao for video visits.     Signed By: Joanne Peñaloza MD

## 2022-01-13 ENCOUNTER — OFFICE VISIT (OUTPATIENT)
Dept: ONCOLOGY | Age: 77
End: 2022-01-13
Payer: MEDICARE

## 2022-01-13 VITALS
SYSTOLIC BLOOD PRESSURE: 126 MMHG | WEIGHT: 161 LBS | OXYGEN SATURATION: 96 % | HEART RATE: 78 BPM | BODY MASS INDEX: 27.49 KG/M2 | RESPIRATION RATE: 18 BRPM | HEIGHT: 64 IN | TEMPERATURE: 96.9 F | DIASTOLIC BLOOD PRESSURE: 67 MMHG

## 2022-01-13 DIAGNOSIS — D47.3 ESSENTIAL THROMBOCYTOSIS (HCC): Primary | ICD-10-CM

## 2022-01-13 PROCEDURE — 1101F PT FALLS ASSESS-DOCD LE1/YR: CPT | Performed by: INTERNAL MEDICINE

## 2022-01-13 PROCEDURE — G8417 CALC BMI ABV UP PARAM F/U: HCPCS | Performed by: INTERNAL MEDICINE

## 2022-01-13 PROCEDURE — 1090F PRES/ABSN URINE INCON ASSESS: CPT | Performed by: INTERNAL MEDICINE

## 2022-01-13 PROCEDURE — G0463 HOSPITAL OUTPT CLINIC VISIT: HCPCS | Performed by: INTERNAL MEDICINE

## 2022-01-13 PROCEDURE — 99214 OFFICE O/P EST MOD 30 MIN: CPT | Performed by: INTERNAL MEDICINE

## 2022-01-13 PROCEDURE — G8754 DIAS BP LESS 90: HCPCS | Performed by: INTERNAL MEDICINE

## 2022-01-13 PROCEDURE — G9717 DOC PT DX DEP/BP F/U NT REQ: HCPCS | Performed by: INTERNAL MEDICINE

## 2022-01-13 PROCEDURE — G8427 DOCREV CUR MEDS BY ELIG CLIN: HCPCS | Performed by: INTERNAL MEDICINE

## 2022-01-13 PROCEDURE — G8399 PT W/DXA RESULTS DOCUMENT: HCPCS | Performed by: INTERNAL MEDICINE

## 2022-01-13 PROCEDURE — G8536 NO DOC ELDER MAL SCRN: HCPCS | Performed by: INTERNAL MEDICINE

## 2022-01-13 PROCEDURE — G8752 SYS BP LESS 140: HCPCS | Performed by: INTERNAL MEDICINE

## 2022-01-13 RX ORDER — OXYBUTYNIN CHLORIDE 10 MG/1
TABLET, EXTENDED RELEASE ORAL
COMMUNITY
Start: 2021-11-16 | End: 2022-08-10 | Stop reason: ALTCHOICE

## 2022-01-13 NOTE — PROGRESS NOTES
Salvatore López is a 64 y.o. female follow up for ET. 1. Have you been to the ER, urgent care clinic since your last visit? Hospitalized since your last visit?no     2. Have you seen or consulted any other health care providers outside of the [address] [place] [address] since your last visit? Include any pap smears or colon screening.  no

## 2022-02-28 ENCOUNTER — OFFICE VISIT (OUTPATIENT)
Dept: CARDIOLOGY CLINIC | Age: 77
End: 2022-02-28
Payer: MEDICARE

## 2022-02-28 VITALS
HEART RATE: 85 BPM | SYSTOLIC BLOOD PRESSURE: 142 MMHG | OXYGEN SATURATION: 97 % | RESPIRATION RATE: 16 BRPM | WEIGHT: 160 LBS | DIASTOLIC BLOOD PRESSURE: 80 MMHG | BODY MASS INDEX: 27.31 KG/M2 | HEIGHT: 64 IN

## 2022-02-28 DIAGNOSIS — I67.9 CEREBRAL VASCULAR DISEASE: ICD-10-CM

## 2022-02-28 DIAGNOSIS — R00.2 HEART PALPITATIONS: Primary | ICD-10-CM

## 2022-02-28 DIAGNOSIS — D47.3 ESSENTIAL THROMBOCYTOSIS (HCC): ICD-10-CM

## 2022-02-28 DIAGNOSIS — R42 DIZZINESS: ICD-10-CM

## 2022-02-28 DIAGNOSIS — Z71.89 CARDIAC RISK COUNSELING: ICD-10-CM

## 2022-02-28 DIAGNOSIS — I10 ESSENTIAL HYPERTENSION: ICD-10-CM

## 2022-02-28 DIAGNOSIS — E78.00 HYPERCHOLESTEREMIA: ICD-10-CM

## 2022-02-28 PROCEDURE — 93005 ELECTROCARDIOGRAM TRACING: CPT | Performed by: SPECIALIST

## 2022-02-28 PROCEDURE — 99204 OFFICE O/P NEW MOD 45 MIN: CPT | Performed by: SPECIALIST

## 2022-02-28 PROCEDURE — G0463 HOSPITAL OUTPT CLINIC VISIT: HCPCS | Performed by: SPECIALIST

## 2022-02-28 PROCEDURE — 93010 ELECTROCARDIOGRAM REPORT: CPT | Performed by: SPECIALIST

## 2022-02-28 PROCEDURE — 1090F PRES/ABSN URINE INCON ASSESS: CPT | Performed by: SPECIALIST

## 2022-02-28 RX ORDER — ARIPIPRAZOLE 5 MG/1
TABLET ORAL
COMMUNITY
Start: 2022-02-07

## 2022-02-28 NOTE — Clinical Note
2/28/2022    Patient: Vinnie Lyle   YOB: 1945   Date of Visit: 2/28/2022     Calixto Underwood MD  [address] [place] [id number]  Via Fax: [phone]    Dear Calixto Underwood MD,      Thank you for referring Ms. Michelle Lobo to [facility] for evaluation. My notes for this consultation are attached. If you have questions, please do not hesitate to call me. I look forward to following your patient along with you.       Sincerely,    Nazario Abdalla MD

## 2022-02-28 NOTE — PATIENT INSTRUCTIONS
You have been scheduled for a Nuclear Stress Test and an echocardiogram.    Nuclear stress testing evaluates blood flow to your heart muscle and assesses cardiac function. There are 2 parts (Rest/Stress) to this procedure and will include exercise on a treadmill. *Please arrive 15 minutes prior to your appointment time    Test Duration:    -One day testing will take 4 hours    Day of testing instructions:    1. NO CAFFEINE (not even decaffeinated products) 24 HOURS PRIOR TO TESTING. This includes coffee, soda, tea, chocolate, multivitamins, and migraine medication, like Excedrin or Fioricet that contains caffeine. 2. Nothing to eat or drink 4 HOURS prior to testing  3. NO NICOTINE 12 hours prior to testing  4. Wear comfortable clothes and shoes (Shirts with no metal, shorts or pants, tennis shoes, no heels or flip flops)    IMPORTANT: This testing involves a cardiac tracer ordered specifically for you. If you are unable to make your appointment, please call to cancel/reschedule AT LEAST 24 hours prior to your appointment so your tracer can be cancelled. [phone].

## 2022-02-28 NOTE — PROGRESS NOTES
Troy Michael     1945       Denny Mcdonald MD, [facility]  Date of Visit-2022   PCP is [facility] [place]  [facility]  HPI:  Troy Michael is a 64 y.o. female   New pt referred by PCP for fluctuating BP. Referred from Dr. Buckley Sacks  Chief complaint dizziness had small stroke a year ago and is lethargic for about a year with depression    She see's Dr. Juwan Euceda for thrombocytosis. Has had a stroke with fatigue and depression and is on Plavix. Saw Dr. Opal Antoine at [facility] on 21 with cardiac monitor. Pt is accompanied by her . Pt notes her stroke was [place] night in  and reports it was over in half an hour. She states her depression is recurring and intermittent that is accompanied by dizziness upon standing and some lethargy. Pt reports SOB when walking from the car [place] sidewalk [place] the mall. Pt takes multiple vitamins because of a bariatric bypass 10 years ago. She states her mother had polycythemia. Denies chest pain, edema, syncope or shortness of breath at rest, has no tachycardia, palpitations or sense of arrhythmia. EKG: SR WNL heart rate 80  QTc 417    Medical history =polycythemia on hydroxyurea, hypertension on olmesartan, cerebrovascular disease on Plavix, hypothyroidism on Synthroid, anxiety and depression on venlafaxine, hypercholesterolemia on rosuvastatin, macular degeneration, status post gastric bypass, on Ozempic for weight management, on oxybutynin and aripiprazole for bladder and depression. Family history = mother  at 80 of a blood clot and polycythemia. Father  at 66 of lung cancer. Her brother is 77 with asthma obesity and hypertension. She has a sister at 76 with breast cancer twice thyroid disorder diabetes and obesity. She has a sister who is 67 with macular degeneration.   Social history =  enjoys genealogy knitting reading has 3 children. Ex-smoker for many years ago  Records from PCP are reviewed Dr. Siu Mom including visit 12/16/2021 with hypertension previous CVA 12/25/2020  EKG from 11/9/2021 is normal.  Lipids 3/23/2021 HDL 80 triglycerides 60 LDL 36 particle #326 total cholesterol 130  Assessment/Plan:     1. Dizziness with heart palpitation and fatigue for cardiac risk counseling  2. Cerebrovascular disease  3. Fluctuating blood pressure, hypertension  4. Dyspnea on exertion  5. Hypercholesterolemia  6. Thrombocytosis      Pt referred by her psychologist for further insight into a cardiac opinion due to her persistent fatigue and and worsening WADE. She has prior hx of CVA and probably just got testing done but not seen cardiology at [facility]. Sounds like she had a ct scan and holter. I am concerned for possible impairment in LV function and possible CAD. Will get stress nuke and echo and call with results. She had blood work in January that showed no anemia and normal kidney function. She wanted to be able to establish a relationship with a cardiologist and we are pleased to go over her symptoms and try to give her some insight into her disease. I will speak to her once we have the testing done. - AMB POC EKG ROUTINE W/ 12 LEADS, INTER & REP  Patient Instructions   You have been scheduled for a Nuclear Stress Test and an echocardiogram.    Nuclear stress testing evaluates blood flow to your heart muscle and assesses cardiac function. There are 2 parts (Rest/Stress) to this procedure and will include exercise on a treadmill. *Please arrive 15 minutes prior to your appointment time    Test Duration:    -One day testing will take 4 hours    Day of testing instructions:    1. NO CAFFEINE (not even decaffeinated products) 24 HOURS PRIOR TO TESTING. This includes coffee, soda, tea, chocolate, multivitamins, and migraine medication, like Excedrin or Fioricet that contains caffeine.   2. Nothing to eat or drink 4 HOURS prior to testing  3. NO NICOTINE 12 hours prior to testing  4. Wear comfortable clothes and shoes (Shirts with no metal, shorts or pants, tennis shoes, no heels or flip flops)    IMPORTANT: This testing involves a cardiac tracer ordered specifically for you. If you are unable to make your appointment, please call to cancel/reschedule AT LEAST 24 hours prior to your appointment so your tracer can be cancelled. [phone]. Will call with results     Impression:   1. Heart palpitations    2. Cardiac risk counseling    3. Dizziness    4. Cerebral vascular disease    5. Essential hypertension    6. Hypercholesteremia    7. Essential thrombocytosis ([facility] [address]       Cardiac History:   No specialty comments available. Future Appointments   Date Time Provider Kika Hurd   3/30/2022  1:00 PM LETTY VOGEL  AMB   3/30/2022  4:00 PM ECHOTWLETTY ASHFORD AMB   4/22/2022 10:00 AM Jenn Veronica MD [facility]  AMB        Patient Care Team:  Samantha Roy MD as PCP - General (Family Medicine)  Samantha Roy MD as PCP - [facility] Empaneled Provider  Dwight Baumgarten, MD (Orthopedic Surgery)  Jenn Veronica MD (Hematology and Oncology)  Bob Cherry MD (Colon and Rectal Surgery)  Other, MD Debbie Dominguez MD (Cardiology)    ROS-cardiovascular positive for shortness of breath irregular heartbeat tachycardia dizziness and leg pains. Head and neck positive headaches needing glasses buzzing in ears. Respiratory positive shortness of breath wheezing and cough from asthma. GI positive for belching endocrine positive for thyroid disease and intolerance to cold. GYN positive for hysterectomy 1979. Musculoskeletal positive for arthritis weakness in arm poor circulation. Neurologic positive for depression anxiety. The  skin hematologic and infection review of systems were negative please see scanned worksheet except as noted above. . A complete cardiac and respiratory are reviewed and negative except as above ; see supplement sheet, initialed and to be scanned by staff  Past Medical History:   Diagnosis Date    Allergic rhinitis 1/28/2009    Asthma 1/28/2009    Bariatric surgery status 10/16/2017    Dental disorder NEC     Elevated LFT's 1/28/2009    HTN 1/28/2009    CONTROLLED BY MEDS.  Hx of diabetes mellitus 6/14/2018    Hypothyroidism 1/28/2009    Left sided sciatica 1/28/2009    Macular degeneration 1/28/2009    Macular degeneration     Morbid obesity ([address]     Musculoskeletal disorder     Sleep apnea 1/28/2009    USES C-PAP    Thyroid disease     HYPOTHYROIDISM      Social Hx= reports that she quit smoking about 53 years ago. She has a 6.00 pack-year smoking history. She has never used smokeless tobacco. She reports current alcohol use of about 1.7 standard drinks of alcohol per week. She reports that she does not use drugs. Exam and Labs:  BP (!) 142/80 (BP 1 Location: Left upper arm, BP Patient Position: Sitting, BP Cuff Size: Adult)   Pulse 85   Resp 16   Ht 5' 4\" (1.626 m)   Wt 160 lb (72.6 kg)   SpO2 97%   BMI 27.46 kg/m² Constitutional:  NAD, comfortable  Head: NC,AT. Eyes: No scleral icterus. Neck:  Neck supple. No JVD present. Throat: moist mucous membranes. Chest: Effort normal & normal respiratory excursion . Neurological: alert, conversant and oriented . Skin: Skin is not cold. No obvious systemic rash noted. Not diaphoretic. No erythema. Psychiatric:  Grossly normal mood and affect. Behavior appears normal. Extremities:  no clubbing or cyanosis. Abdomen: non distended    Lungs:breath sounds normal. No stridor. distress, wheezes or  Rales. Heart: normal rate, regular rhythm, normal S1, S2, no murmurs, rubs, clicks or gallops , PMI non displaced. Edema: Edema is none.   Lab Results   Component Value Date/Time    Cholesterol, total 144 06/14/2018 10:25 AM    HDL Cholesterol 72 06/14/2018 10:25 AM    LDL, calculated 59 06/14/2018 10:25 AM    Triglyceride 63 06/14/2018 10:25 AM    CHOL/HDL Ratio 4.3 07/20/2009 10:32 AM     Lab Results   Component Value Date/Time    Sodium 139 01/10/2022 12:24 PM    Potassium 4.3 01/10/2022 12:24 PM    Chloride 105 01/10/2022 12:24 PM    CO2 29 01/10/2022 12:24 PM    Anion gap 5 01/10/2022 12:24 PM    Glucose 86 01/10/2022 12:24 PM    BUN 15 01/10/2022 12:24 PM    Creatinine 0.68 01/10/2022 12:24 PM    BUN/Creatinine ratio 22 (H) 01/10/2022 12:24 PM    GFR est AA >60 01/10/2022 12:24 PM    GFR est non-AA >60 01/10/2022 12:24 PM    Calcium 9.2 01/10/2022 12:24 PM      Wt Readings from Last 3 Encounters:   02/28/22 160 lb (72.6 kg)   01/13/22 161 lb (73 kg)   09/30/21 175 lb (79.4 kg)      BP Readings from Last 3 Encounters:   02/28/22 (!) 142/80   01/13/22 126/67   09/30/21 (!) 118/57      Current Outpatient Medications   Medication Sig    oxybutynin chloride XL (DITROPAN XL) 10 mg CR tablet     Ozempic 0.25 mg or 0.5 mg/dose (2 mg/1.5 ml) subq pen INJECT 1 PEN UNDER THE SKIN ONCE A WEEK    rosuvastatin (CRESTOR) 10 mg tablet Take 10 mg by mouth nightly.  olmesartan (BENICAR) 40 mg tablet Take  by mouth daily.  CALCIUM PO Take  by mouth.  hydroxyurea (Hydrea) 500 mg capsule Take 2 Caps by mouth daily.  clopidogreL (PLAVIX) 75 mg tab     levothyroxine (SYNTHROID) 125 mcg tablet Take 135 mcg by mouth Daily (before breakfast).  venlafaxine (EFFEXOR) 37.5 mg tablet Take 37.5 mg by mouth daily.  multivitamin (ONE A DAY) tablet Take 1 Tab by mouth daily.  Vit A,C,E-Zinc-Copper (PRESERVISION AREDS) Cap capsule Take 1 Cap by mouth two (2) times a day. BREAKFAST & BED TIME.  ARIPiprazole (ABILIFY) 5 mg tablet     cyanocobalamin (VITAMIN B-12) 500 mcg tablet Take 500 mcg by mouth daily. (Patient not taking: Reported on 2/28/2022)     No current facility-administered medications for this visit. Impression see above.       Written by Heebrt Cook, as dictated by Param Stafford MD.

## 2022-03-08 DIAGNOSIS — D47.3 ESSENTIAL THROMBOCYTOSIS (HCC): ICD-10-CM

## 2022-03-08 RX ORDER — HYDROXYUREA 500 MG/1
CAPSULE ORAL
Qty: 60 CAPSULE | Refills: 11 | Status: SHIPPED | OUTPATIENT
Start: 2022-03-08

## 2022-03-18 PROBLEM — Z86.39 HX OF DIABETES MELLITUS: Status: ACTIVE | Noted: 2018-06-14

## 2022-03-19 PROBLEM — Z98.84 BARIATRIC SURGERY STATUS: Status: ACTIVE | Noted: 2017-10-16

## 2022-03-19 PROBLEM — D47.3 ESSENTIAL THROMBOCYTOSIS (HCC): Status: ACTIVE | Noted: 2019-03-21

## 2022-03-29 ENCOUNTER — TELEPHONE (OUTPATIENT)
Dept: CARDIOLOGY CLINIC | Age: 77
End: 2022-03-29

## 2022-03-29 NOTE — TELEPHONE ENCOUNTER
Patient verified per protocol; nuclear stress test appointment reminder given with date, time, location, and prep. Patient verbalized understanding.

## 2022-03-30 ENCOUNTER — ANCILLARY PROCEDURE (OUTPATIENT)
Dept: CARDIOLOGY CLINIC | Age: 77
End: 2022-03-30
Payer: MEDICARE

## 2022-03-30 VITALS
WEIGHT: 160 LBS | SYSTOLIC BLOOD PRESSURE: 140 MMHG | HEIGHT: 64 IN | BODY MASS INDEX: 27.31 KG/M2 | DIASTOLIC BLOOD PRESSURE: 68 MMHG

## 2022-03-30 DIAGNOSIS — I10 ESSENTIAL HYPERTENSION: ICD-10-CM

## 2022-03-30 DIAGNOSIS — E11.9 TYPE 2 DIABETES MELLITUS WITHOUT COMPLICATION, UNSPECIFIED WHETHER LONG TERM INSULIN USE (HCC): ICD-10-CM

## 2022-03-30 DIAGNOSIS — Z86.39 HX OF DIABETES MELLITUS: ICD-10-CM

## 2022-03-30 DIAGNOSIS — R06.09 DOE (DYSPNEA ON EXERTION): ICD-10-CM

## 2022-03-30 DIAGNOSIS — R06.02 SHORTNESS OF BREATH: ICD-10-CM

## 2022-03-30 LAB
ECHO AO ASC DIAM: 3 CM
ECHO AO ASCENDING AORTA INDEX: 1.69 CM/M2
ECHO AO ROOT DIAM: 2.8 CM
ECHO AO ROOT INDEX: 1.57 CM/M2
ECHO AV AREA PEAK VELOCITY: 3.1 CM2
ECHO AV AREA VTI: 3.2 CM2
ECHO AV AREA/BSA PEAK VELOCITY: 1.7 CM2/M2
ECHO AV AREA/BSA VTI: 1.8 CM2/M2
ECHO AV MEAN GRADIENT: 5 MMHG
ECHO AV MEAN VELOCITY: 1 M/S
ECHO AV PEAK GRADIENT: 8 MMHG
ECHO AV PEAK VELOCITY: 1.4 M/S
ECHO AV VELOCITY RATIO: 1.07
ECHO AV VTI: 29.1 CM
ECHO EST RA PRESSURE: 3 MMHG
ECHO LA DIAMETER INDEX: 2.25 CM/M2
ECHO LA DIAMETER: 4 CM
ECHO LA TO AORTIC ROOT RATIO: 1.43
ECHO LA VOL 2C: 64 ML (ref 22–52)
ECHO LA VOL 4C: 59 ML (ref 22–52)
ECHO LA VOL BP: 62 ML (ref 22–52)
ECHO LA VOL/BSA BIPLANE: 35 ML/M2 (ref 16–34)
ECHO LA VOLUME AREA LENGTH: 68 ML
ECHO LA VOLUME INDEX A2C: 36 ML/M2 (ref 16–34)
ECHO LA VOLUME INDEX A4C: 33 ML/M2 (ref 16–34)
ECHO LA VOLUME INDEX AREA LENGTH: 38 ML/M2 (ref 16–34)
ECHO LV E' LATERAL VELOCITY: 7 CM/S
ECHO LV E' SEPTAL VELOCITY: 6 CM/S
ECHO LV EDV A4C: 35 ML
ECHO LV EDV INDEX A4C: 20 ML/M2
ECHO LV EJECTION FRACTION A4C: 77 %
ECHO LV ESV A4C: 8 ML
ECHO LV ESV INDEX A4C: 4 ML/M2
ECHO LV FRACTIONAL SHORTENING: 40 % (ref 28–44)
ECHO LV INTERNAL DIMENSION DIASTOLE INDEX: 1.4 CM/M2
ECHO LV INTERNAL DIMENSION DIASTOLIC: 2.5 CM (ref 3.9–5.3)
ECHO LV INTERNAL DIMENSION SYSTOLIC INDEX: 0.84 CM/M2
ECHO LV INTERNAL DIMENSION SYSTOLIC: 1.5 CM
ECHO LV IVSD: 1.3 CM (ref 0.6–0.9)
ECHO LV MASS 2D: 111.5 G (ref 67–162)
ECHO LV MASS INDEX 2D: 62.6 G/M2 (ref 43–95)
ECHO LV POSTERIOR WALL DIASTOLIC: 1.5 CM (ref 0.6–0.9)
ECHO LV RELATIVE WALL THICKNESS RATIO: 1.2
ECHO LVOT AREA: 2.8 CM2
ECHO LVOT AV VTI INDEX: 1.07
ECHO LVOT DIAM: 1.9 CM
ECHO LVOT MEAN GRADIENT: 6 MMHG
ECHO LVOT PEAK GRADIENT: 9 MMHG
ECHO LVOT PEAK VELOCITY: 1.5 M/S
ECHO LVOT STROKE VOLUME INDEX: 49.4 ML/M2
ECHO LVOT SV: 87.8 ML
ECHO LVOT VTI: 31 CM
ECHO MV A VELOCITY: 1.45 M/S
ECHO MV E DECELERATION TIME (DT): 255.4 MS
ECHO MV E VELOCITY: 1.04 M/S
ECHO MV E/A RATIO: 0.72
ECHO MV E/E' LATERAL: 14.86
ECHO MV E/E' RATIO (AVERAGED): 16.1
ECHO MV E/E' SEPTAL: 17.33
ECHO PV MAX VELOCITY: 0.9 M/S
ECHO PV PEAK GRADIENT: 4 MMHG
ECHO RIGHT VENTRICULAR SYSTOLIC PRESSURE (RVSP): 32 MMHG
ECHO RV TAPSE: 2 CM (ref 1.5–2)
ECHO TV REGURGITANT MAX VELOCITY: 2.69 M/S
ECHO TV REGURGITANT PEAK GRADIENT: 29 MMHG
NUC STRESS EJECTION FRACTION: 72 %
STRESS BASELINE DIAS BP: 70 MMHG
STRESS BASELINE HR: 85 BPM
STRESS BASELINE SYS BP: 140 MMHG
STRESS O2 SAT PEAK: 100 %
STRESS O2 SAT REST: 97 %
STRESS PEAK DIAS BP: 60 MMHG
STRESS PEAK SYS BP: 124 MMHG
STRESS PERCENT HR ACHIEVED: 75 %
STRESS POST PEAK HR: 107 BPM
STRESS RATE PRESSURE PRODUCT: NORMAL BPM*MMHG
STRESS TARGET HR: 142 BPM
TID: 1.17

## 2022-03-30 PROCEDURE — 93017 CV STRESS TEST TRACING ONLY: CPT | Performed by: SPECIALIST

## 2022-03-30 PROCEDURE — 93016 CV STRESS TEST SUPVJ ONLY: CPT | Performed by: SPECIALIST

## 2022-03-30 PROCEDURE — 93306 TTE W/DOPPLER COMPLETE: CPT | Performed by: SPECIALIST

## 2022-03-30 PROCEDURE — 93018 CV STRESS TEST I&R ONLY: CPT | Performed by: SPECIALIST

## 2022-03-30 PROCEDURE — 78452 HT MUSCLE IMAGE SPECT MULT: CPT | Performed by: SPECIALIST

## 2022-03-30 PROCEDURE — A9500 TC99M SESTAMIBI: HCPCS | Performed by: SPECIALIST

## 2022-03-30 RX ORDER — TETRAKIS(2-METHOXYISOBUTYLISOCYANIDE)COPPER(I) TETRAFLUOROBORATE 1 MG/ML
10 INJECTION, POWDER, LYOPHILIZED, FOR SOLUTION INTRAVENOUS ONCE
Status: COMPLETED | OUTPATIENT
Start: 2022-03-30 | End: 2022-03-30

## 2022-03-30 RX ORDER — TETRAKIS(2-METHOXYISOBUTYLISOCYANIDE)COPPER(I) TETRAFLUOROBORATE 1 MG/ML
30 INJECTION, POWDER, LYOPHILIZED, FOR SOLUTION INTRAVENOUS ONCE
Status: COMPLETED | OUTPATIENT
Start: 2022-03-30 | End: 2022-03-30

## 2022-03-30 RX ADMIN — REGADENOSON 0.4 MG: 0.08 INJECTION, SOLUTION INTRAVENOUS at 14:35

## 2022-03-30 RX ADMIN — TETRAKIS(2-METHOXYISOBUTYLISOCYANIDE)COPPER(I) TETRAFLUOROBORATE 8.8 MILLICURIE: 1 INJECTION, POWDER, LYOPHILIZED, FOR SOLUTION INTRAVENOUS at 13:20

## 2022-03-30 RX ADMIN — TECHNETIUM TC 99M SESTAMIBI 25.7 MILLICURIE: 1 INJECTION INTRAVENOUS at 14:35

## 2022-03-31 NOTE — PROGRESS NOTES
Her nuclear stress test shows no ischemia and is normal.Her echocardiogram shows a normal ejection fraction and no significant valve disease. There are some mild valve that do not represent a significant abnormality. The basal septal thickening is not an uncommon variant at this age and does not require any specific therapy. There is calcification and sclerosis of the mitral and aortic valves typical for age. Overall heart muscle function valve function is good and there is no evidence of ischemia. She has fluctuating blood pressure and dizziness continue follow-up with primary care physician with current medications.   We will see back as needed but glad to see back sooner if any issues arise  Future Appointments  4/22/2022  10:00 AM   Rosalina Veronica MD         [facility]               BS AMB

## 2022-04-07 ENCOUNTER — TELEPHONE (OUTPATIENT)
Dept: CARDIOLOGY CLINIC | Age: 77
End: 2022-04-07

## 2022-04-07 NOTE — TELEPHONE ENCOUNTER
TC from pt, ID verified. Calling about her testing. Would like to know if she needs to make an appt to see Dr. Talita Boucher her testing had been read, but I did not receive a result note. Will forward to another provider to review and get back with her.  Requests we call her Mobile#

## 2022-04-08 NOTE — TELEPHONE ENCOUNTER
TC to pt, ID verfiied. Advised pt of nuclear stress test and echo result per Dr. Guaman Means note. Pt understands, no questions.

## 2022-04-12 NOTE — PROGRESS NOTES
[facility] at [place] [address] [address] [facility]  Anitha Sessions: [phone]  F: [phone]      Reason for Visit:   Joie Lyle is a 76 y.o. female who is seen for follow up of essential thrombocythemia. Treatment History:   · Presented with thrombocytosis  · JAK2 V617 mutation positive on 2/28/2019  · Bone marrow biopsy 4/9/2019: JAK2 positive myeloproliferative neoplasm, most consistent with essential thrombocythemia. · Essential Thrombocythemia  · Hydrea started 4/13/2019    History of Present Illness:   She notes significant position dizziness/lightheadendess. Occurs with standing, intermittent, sometimes feels like she is going to black out. She also remains quite fatigued. She remains on hydrea 1000mg daily. She continues to work on weight loss, has lost some more weight since our last visit. Her mother had polycythemia vera, required therapeutic phlebotomy. Review of systems was obtained and pertinent findings reviewed above. Past medical history, social history, family history, medications, and allergies are located in the electronic medical record. Physical Exam:     Visit Vitals  /66 (BP 1 Location: Left upper arm, BP Patient Position: Sitting, BP Cuff Size: Large adult) Comment: . Pulse 94   Temp (!) 96.7 °F (35.9 °C) (Temporal)   Resp 20   Ht 5' 4\" (1.626 m)   Wt 153 lb (69.4 kg)   SpO2 97%   BMI 26.26 kg/m²     General: no distress  Respiratory: normal respiratory effort  CV: no peripheral edema  Skin: no rashes; no ecchymoses; no petechiae      Results:     Lab Results   Component Value Date/Time    WBC 4.0 04/18/2022 09:48 AM    HGB 12.5 04/18/2022 09:48 AM    HCT 37.2 04/18/2022 09:48 AM    PLATELET 406 22/26/8910 09:48 AM    .6 (H) 04/18/2022 09:48 AM    ABS.  NEUTROPHILS 1.5 (L) 04/18/2022 09:48 AM     Lab Results   Component Value Date/Time    Sodium 138 04/18/2022 09:48 AM    Potassium 4.1 04/18/2022 09:48 AM    Chloride 104 04/18/2022 09:48 AM    CO2 29 04/18/2022 09:48 AM    Glucose 78 04/18/2022 09:48 AM    BUN 13 04/18/2022 09:48 AM    Creatinine 0.65 04/18/2022 09:48 AM    GFR est AA >60 04/18/2022 09:48 AM    GFR est non-AA >60 04/18/2022 09:48 AM    Calcium 9.2 04/18/2022 09:48 AM    Glucose (POC) 126 (H) 08/22/2012 06:54 AM     Lab Results   Component Value Date/Time    Bilirubin, total 0.4 04/18/2022 09:48 AM    ALT (SGPT) 46 04/18/2022 09:48 AM    Alk.  phosphatase 69 04/18/2022 09:48 AM    Protein, total 6.6 04/18/2022 09:48 AM    Albumin 3.8 04/18/2022 09:48 AM    Globulin 2.8 04/18/2022 09:48 AM     Lab Results   Component Value Date/Time    Iron % saturation 29 06/14/2018 10:25 AM    TIBC 312 06/14/2018 10:25 AM    Ferritin 178 (H) 06/14/2018 10:25 AM    Vitamin B12 1,688 (H) 06/14/2018 10:25 AM    Folate 18.5 06/14/2018 10:25 AM    Erythropoietin 5.1 02/28/2019 12:54 PM     02/28/2019 12:54 PM    Sed rate (ESR) 3 02/28/2019 12:54 PM    C-Reactive protein 0.50 08/02/2012 11:47 AM    C-Reactive Protein, Qt 1.1 02/28/2019 12:54 PM    TSH 0.758 06/14/2018 10:25 AM    TSH 3.02 08/02/2012 11:47 AM    Lipase 92 (H) 02/28/2019 12:54 PM     PLATELET   Date Value   04/18/2022 188 K/uL   01/10/2022 188 K/uL   09/28/2021 206 K/uL   06/23/2021 273 K/uL   04/26/2021 248 K/uL   03/23/2021 303 K/uL   02/15/2021 440 K/uL (H)   11/16/2020 433 K/uL (H)   08/17/2020 415 K/uL (H)   05/14/2020 425 x10E3/uL   02/14/2020 432 K/uL (H)   12/20/2019 422 K/uL (H)   11/14/2019 458 x10E3/uL (H)   08/13/2019 434 x10E3/uL   07/11/2019 434 x10E3/uL   06/11/2019 465 x10E3/uL (H)   05/09/2019 480 x10E3/uL (H)   03/28/2019 491 K/uL (H)   02/28/2019 547 x10E3/uL (H)   06/14/2018 458 x10E3/uL (H)   08/21/2012 321 K/uL   08/02/2012 298 K/uL       9/20/2018  WBC: 6.1  HGB: 14.0  PLT: 474  Smear: Platelets vary in size, otherwise unremarkable  Creatinine: 0.68  AST, ALT, ALP, TBili: wnl  Ferritin: 122  Iron saturation: 19%    2/20/2019 (from PCP)  WBC: 7.3  HGB: 14.3  PLT: 498    2/20/2019 (patient supplied)  WBC: 7.8  HGB: 14.2  HCT: 43.7  PLT: 514  B12: 1559  Folate: 16.7  Iron sat: 25%  Ferritin: 125  Copper: 94  Zinc: 90  TSH: 1.62    JAK2 2/28/2019: POSITIVE for the detection of the V617F mutation. 4/4/2022  WBC: 2.8  HGB: 11.8  PLT: 197  Creatinine: 0.65  AST, ALT, ALP, TBili: wnl      Bone marrow biopsy 4/9/2019: estimated normocellular marrow for age with maturing trilineage hematopoiesis, mildly increased megakaryocytes and no increase in blasts. Preliminary comment:  Findings are of a JAK2 positive myeloproliferative neoplasm, most consistent with essential thrombocythemia. Assessment:   1) Essential Thrombocythemia  JAK2+  Diagnosis confirmed on bone marrow biopsy. She had an IPSET prognosis score of 2, which is intermediate risk and translates to a median overall survival of 17 years to 24.5 years, depending on the study. Her IPSET thrombosis score is high risk based on age and JAK2 mutation, with a 3.6%/year risk of thrombosis. She began hydrea therapy 4/2019, with goal of reducing her platelets into the normal range to reduce her risk of thrombotic complications. Dose increased to 1000mg PO daily in 2/2021 when PLT lianne to 440 and after developing a CVA. Labs remain at goal on current dose of therapy. She is tolerating treatment reasonably well. Continued fatigue, but I'm not clear if this is related. Her counts are low enough now that I think we can try backing down on her dose. I will have her drop to 500mg on M/W/F, continue 1000mg on other days. The patient will be seen every 3 months while on therapy, and labs will be monitored to assess for toxicity from therapy. Interestingly, her mother had PV, so there may be a familial component to her illness. 2) Mouth sores  Intermittent, possibly from hydrea. Overall improved.   Have discussed salt water rinses, OTC oragel PRN    3) H/o CVA  On plavix. Seen by neurology. 4) Fatigue  Uncertain etiology. Labs look ok. Possibly from hydrea? Adjusting dose as above, though this seems much more significant than I would expect from this drug. Possibly depression? She is seeing a therapist, and has started Abilify. She had a recent evaluation with cardiology. 5) Dizziness  Positional in nature. Perhaps orthostatic? S/p cardiology evaluation. Follow up with PCP for continued evaluation. 6) Emotional Well Being  No psychosocial concerns identified today. Patient has adequate support. Plan:     · Continue Hydroxyurea, adjust dose to 500mg on M/W/F and 1000mg on other days  · Labs in 4-6 weeks: CBC  · Labs in 3 months: CBC and CMP (She prefers [employer])  · Return to see me in 3 months    David Elise for video visits.     Signed By: Khushi Cerna MD

## 2022-04-22 ENCOUNTER — OFFICE VISIT (OUTPATIENT)
Dept: ONCOLOGY | Age: 77
End: 2022-04-22
Payer: MEDICARE

## 2022-04-22 VITALS
TEMPERATURE: 96.7 F | WEIGHT: 153 LBS | HEIGHT: 64 IN | DIASTOLIC BLOOD PRESSURE: 66 MMHG | SYSTOLIC BLOOD PRESSURE: 112 MMHG | OXYGEN SATURATION: 97 % | BODY MASS INDEX: 26.12 KG/M2 | HEART RATE: 94 BPM | RESPIRATION RATE: 20 BRPM

## 2022-04-22 DIAGNOSIS — D47.3 ESSENTIAL THROMBOCYTOSIS (HCC): Primary | ICD-10-CM

## 2022-04-22 PROCEDURE — G8427 DOCREV CUR MEDS BY ELIG CLIN: HCPCS | Performed by: INTERNAL MEDICINE

## 2022-04-22 PROCEDURE — 1090F PRES/ABSN URINE INCON ASSESS: CPT | Performed by: INTERNAL MEDICINE

## 2022-04-22 PROCEDURE — G9717 DOC PT DX DEP/BP F/U NT REQ: HCPCS | Performed by: INTERNAL MEDICINE

## 2022-04-22 PROCEDURE — G0463 HOSPITAL OUTPT CLINIC VISIT: HCPCS | Performed by: INTERNAL MEDICINE

## 2022-04-22 PROCEDURE — 99214 OFFICE O/P EST MOD 30 MIN: CPT | Performed by: INTERNAL MEDICINE

## 2022-04-22 PROCEDURE — 1101F PT FALLS ASSESS-DOCD LE1/YR: CPT | Performed by: INTERNAL MEDICINE

## 2022-04-22 PROCEDURE — G8399 PT W/DXA RESULTS DOCUMENT: HCPCS | Performed by: INTERNAL MEDICINE

## 2022-04-22 PROCEDURE — G8417 CALC BMI ABV UP PARAM F/U: HCPCS | Performed by: INTERNAL MEDICINE

## 2022-04-22 PROCEDURE — G8536 NO DOC ELDER MAL SCRN: HCPCS | Performed by: INTERNAL MEDICINE

## 2022-04-22 PROCEDURE — G8754 DIAS BP LESS 90: HCPCS | Performed by: INTERNAL MEDICINE

## 2022-04-22 PROCEDURE — G8752 SYS BP LESS 140: HCPCS | Performed by: INTERNAL MEDICINE

## 2022-04-22 NOTE — PROGRESS NOTES
Cintia Maurice is a 76 y.o. female follow up for ET. 1. Have you been to the ER, urgent care clinic since your last visit? Hospitalized since your last visit?no     2. Have you seen or consulted any other health care providers outside of the [address] [place] [address] since your last visit? Include any pap smears or colon screening.  no

## 2022-06-03 DIAGNOSIS — D47.3 ESSENTIAL THROMBOCYTOSIS (HCC): ICD-10-CM

## 2022-06-03 LAB
BASOPHILS # BLD: 0 K/UL (ref 0–0.1)
BASOPHILS NFR BLD: 0 % (ref 0–1)
DIFFERENTIAL METHOD BLD: ABNORMAL
EOSINOPHIL # BLD: 0 K/UL (ref 0–0.4)
EOSINOPHIL NFR BLD: 1 % (ref 0–7)
ERYTHROCYTE [DISTWIDTH] IN BLOOD BY AUTOMATED COUNT: 13.3 % (ref 11.5–14.5)
HCT VFR BLD AUTO: 39.2 % (ref 35–47)
HGB BLD-MCNC: 13.1 G/DL (ref 11.5–16)
IMM GRANULOCYTES # BLD AUTO: 0 K/UL (ref 0–0.04)
IMM GRANULOCYTES NFR BLD AUTO: 0 % (ref 0–0.5)
LYMPHOCYTES # BLD: 1.8 K/UL (ref 0.8–3.5)
LYMPHOCYTES NFR BLD: 40 % (ref 12–49)
MCH RBC QN AUTO: 41.3 PG (ref 26–34)
MCHC RBC AUTO-ENTMCNC: 33.4 G/DL (ref 30–36.5)
MCV RBC AUTO: 123.7 FL (ref 80–99)
MONOCYTES # BLD: 0.3 K/UL (ref 0–1)
MONOCYTES NFR BLD: 7 % (ref 5–13)
NEUTS SEG # BLD: 2.5 K/UL (ref 1.8–8)
NEUTS SEG NFR BLD: 52 % (ref 32–75)
NRBC # BLD: 0 K/UL (ref 0–0.01)
NRBC BLD-RTO: 0 PER 100 WBC
PLATELET # BLD AUTO: 218 K/UL (ref 150–400)
PMV BLD AUTO: 9.6 FL (ref 8.9–12.9)
RBC # BLD AUTO: 3.17 M/UL (ref 3.8–5.2)
RBC MORPH BLD: ABNORMAL
WBC # BLD AUTO: 4.6 K/UL (ref 3.6–11)

## 2022-06-16 ENCOUNTER — TELEPHONE (OUTPATIENT)
Dept: ONCOLOGY | Age: 77
End: 2022-06-16

## 2022-06-16 NOTE — TELEPHONE ENCOUNTER
[address] at [facility]  [phone]    06/16/22- Patient stated she's having rectocele and cystocele surgery to fix prolapsed bladder and rectum on 6/27. Confirmed she's taking Plavix for history of stroke. Per Dr. Nav Meneses, Plavix is typically held for 5 days prior to surgery. She could confirm this with her PCP if she would like. Patient verbalized understanding, no further questions or concerns.

## 2022-06-16 NOTE — TELEPHONE ENCOUNTER
Patient left a voicemail stating that she has a rectseal surgery in about 10 days. She would like to know when she should stop plavic. Please advise.     CB# [phone]

## 2022-08-04 DIAGNOSIS — D47.3 ESSENTIAL THROMBOCYTOSIS (HCC): ICD-10-CM

## 2022-08-04 LAB
ALBUMIN SERPL-MCNC: 3.2 G/DL (ref 3.5–5)
ALBUMIN/GLOB SERPL: 1.1 {RATIO} (ref 1.1–2.2)
ALP SERPL-CCNC: 77 U/L (ref 45–117)
ALT SERPL-CCNC: 51 U/L (ref 12–78)
ANION GAP SERPL CALC-SCNC: 6 MMOL/L (ref 5–15)
AST SERPL-CCNC: 32 U/L (ref 15–37)
BASOPHILS # BLD: 0 K/UL (ref 0–0.1)
BASOPHILS NFR BLD: 0 % (ref 0–1)
BILIRUB SERPL-MCNC: 0.4 MG/DL (ref 0.2–1)
BUN SERPL-MCNC: 18 MG/DL (ref 6–20)
BUN/CREAT SERPL: 32 (ref 12–20)
CALCIUM SERPL-MCNC: 8.6 MG/DL (ref 8.5–10.1)
CHLORIDE SERPL-SCNC: 107 MMOL/L (ref 97–108)
CO2 SERPL-SCNC: 27 MMOL/L (ref 21–32)
CREAT SERPL-MCNC: 0.57 MG/DL (ref 0.55–1.02)
DIFFERENTIAL METHOD BLD: ABNORMAL
EOSINOPHIL # BLD: 0 K/UL (ref 0–0.4)
EOSINOPHIL NFR BLD: 1 % (ref 0–7)
ERYTHROCYTE [DISTWIDTH] IN BLOOD BY AUTOMATED COUNT: 13.3 % (ref 11.5–14.5)
GLOBULIN SER CALC-MCNC: 2.8 G/DL (ref 2–4)
GLUCOSE SERPL-MCNC: 79 MG/DL (ref 65–100)
HCT VFR BLD AUTO: 35.5 % (ref 35–47)
HGB BLD-MCNC: 11.7 G/DL (ref 11.5–16)
IMM GRANULOCYTES # BLD AUTO: 0 K/UL (ref 0–0.04)
IMM GRANULOCYTES NFR BLD AUTO: 0 % (ref 0–0.5)
LYMPHOCYTES # BLD: 1.8 K/UL (ref 0.8–3.5)
LYMPHOCYTES NFR BLD: 44 % (ref 12–49)
MCH RBC QN AUTO: 40.6 PG (ref 26–34)
MCHC RBC AUTO-ENTMCNC: 33 G/DL (ref 30–36.5)
MCV RBC AUTO: 123.3 FL (ref 80–99)
MONOCYTES # BLD: 0.2 K/UL (ref 0–1)
MONOCYTES NFR BLD: 5 % (ref 5–13)
NEUTS SEG # BLD: 2.2 K/UL (ref 1.8–8)
NEUTS SEG NFR BLD: 50 % (ref 32–75)
NRBC # BLD: 0 K/UL (ref 0–0.01)
NRBC BLD-RTO: 0 PER 100 WBC
PLATELET # BLD AUTO: 184 K/UL (ref 150–400)
PMV BLD AUTO: 9.8 FL (ref 8.9–12.9)
POTASSIUM SERPL-SCNC: 4.8 MMOL/L (ref 3.5–5.1)
PROT SERPL-MCNC: 6 G/DL (ref 6.4–8.2)
RBC # BLD AUTO: 2.88 M/UL (ref 3.8–5.2)
RBC MORPH BLD: ABNORMAL
SODIUM SERPL-SCNC: 140 MMOL/L (ref 136–145)
WBC # BLD AUTO: 4.2 K/UL (ref 3.6–11)

## 2022-08-05 NOTE — PROGRESS NOTES
[facility] [place]  [address] [place] [address], [address]  Jeovany Sender: [phone]  F: [phone]      Reason for Visit:   Alin Adams is a 76 y.o. female who is seen for follow up of essential thrombocythemia. Treatment History:   Presented with thrombocytosis  JAK2 V617 mutation positive on 2/28/2019  Bone marrow biopsy 4/9/2019: JAK2 positive myeloproliferative neoplasm, most consistent with essential thrombocythemia. Essential Thrombocythemia  Hydrea started 4/13/2019    History of Present Illness:   She reports feeling great since I saw her last.  Significant increase in her energy and improvement in her mood. Feels back to how she felt before her CVA. At our last visit we dropped her hydrea to alternating 500/1000mg dosing. Her mother had polycythemia vera, required therapeutic phlebotomy. Review of systems was obtained and pertinent findings reviewed above. Past medical history, social history, family history, medications, and allergies are located in the electronic medical record. Physical Exam:     Visit Vitals  BP (!) 123/54 (BP 1 Location: Right arm, BP Patient Position: Sitting, BP Cuff Size: Large adult)   Pulse 88   Temp (!) 96.4 °F (35.8 °C) (Temporal)   Resp 18   Ht 5' 4\" (1.626 m)   Wt 151 lb (68.5 kg)   SpO2 98%   BMI 25.92 kg/m²       General: no distress  Respiratory: normal respiratory effort  CV: no peripheral edema  Skin: no rashes; no ecchymoses; no petechiae      Results:     Lab Results   Component Value Date/Time    WBC 4.2 08/04/2022 11:40 AM    HGB 11.7 08/04/2022 11:40 AM    HCT 35.5 08/04/2022 11:40 AM    PLATELET 330 83/64/0712 11:40 AM    .3 (H) 08/04/2022 11:40 AM    ABS.  NEUTROPHILS 2.2 08/04/2022 11:40 AM     Lab Results   Component Value Date/Time    Sodium 140 08/04/2022 11:40 AM    Potassium 4.8 08/04/2022 11:40 AM    Chloride 107 08/04/2022 11:40 AM    CO2 27 08/04/2022 11:40 AM    Glucose 79 08/04/2022 11:40 AM    BUN 18 08/04/2022 11:40 AM    Creatinine 0.57 08/04/2022 11:40 AM    GFR est AA >60 08/04/2022 11:40 AM    GFR est non-AA >60 08/04/2022 11:40 AM    Calcium 8.6 08/04/2022 11:40 AM    Glucose (POC) 126 (H) 08/22/2012 06:54 AM     Lab Results   Component Value Date/Time    Bilirubin, total 0.4 08/04/2022 11:40 AM    ALT (SGPT) 51 08/04/2022 11:40 AM    Alk.  phosphatase 77 08/04/2022 11:40 AM    Protein, total 6.0 (L) 08/04/2022 11:40 AM    Albumin 3.2 (L) 08/04/2022 11:40 AM    Globulin 2.8 08/04/2022 11:40 AM     Lab Results   Component Value Date/Time    Iron % saturation 29 06/14/2018 10:25 AM    TIBC 312 06/14/2018 10:25 AM    Ferritin 178 (H) 06/14/2018 10:25 AM    Vitamin B12 1,688 (H) 06/14/2018 10:25 AM    Folate 18.5 06/14/2018 10:25 AM    Erythropoietin 5.1 02/28/2019 12:54 PM     02/28/2019 12:54 PM    Sed rate (ESR) 3 02/28/2019 12:54 PM    C-Reactive protein 0.50 08/02/2012 11:47 AM    C-Reactive Protein, Qt 1.1 02/28/2019 12:54 PM    TSH 0.758 06/14/2018 10:25 AM    TSH 3.02 08/02/2012 11:47 AM    Lipase 92 (H) 02/28/2019 12:54 PM     PLATELET   Date Value   08/04/2022 184 K/uL   06/03/2022 218 K/uL   04/18/2022 188 K/uL   01/10/2022 188 K/uL   09/28/2021 206 K/uL   06/23/2021 273 K/uL   04/26/2021 248 K/uL   03/23/2021 303 K/uL   02/15/2021 440 K/uL (H)   11/16/2020 433 K/uL (H)   08/17/2020 415 K/uL (H)   05/14/2020 425 x10E3/uL   02/14/2020 432 K/uL (H)   12/20/2019 422 K/uL (H)   11/14/2019 458 x10E3/uL (H)   08/13/2019 434 x10E3/uL   07/11/2019 434 x10E3/uL   06/11/2019 465 x10E3/uL (H)   05/09/2019 480 x10E3/uL (H)   03/28/2019 491 K/uL (H)   02/28/2019 547 x10E3/uL (H)   06/14/2018 458 x10E3/uL (H)   08/21/2012 321 K/uL   08/02/2012 298 K/uL       9/20/2018  WBC: 6.1  HGB: 14.0  PLT: 474  Smear: Platelets vary in size, otherwise unremarkable  Creatinine: 0.68  AST, ALT, ALP, TBili: wnl  Ferritin: 122  Iron saturation: 19%    2/20/2019 (from PCP)  WBC: 7.3  HGB: 14.3  PLT: 498    2/20/2019 (patient supplied)  WBC: 7.8  HGB: 14.2  HCT: 43.7  PLT: 514  B12: 1559  Folate: 16.7  Iron sat: 25%  Ferritin: 125  Copper: 94  Zinc: 90  TSH: 1.62    JAK2 2/28/2019: POSITIVE for the detection of the V617F mutation. 4/4/2022  WBC: 2.8  HGB: 11.8  PLT: 197  Creatinine: 0.65  AST, ALT, ALP, TBili: wnl      Bone marrow biopsy 4/9/2019: estimated normocellular marrow for age with maturing trilineage hematopoiesis, mildly increased megakaryocytes and no increase in blasts. Preliminary comment:  Findings are of a JAK2 positive myeloproliferative neoplasm, most consistent with essential thrombocythemia. Assessment:   1) Essential Thrombocythemia  JAK2+  Diagnosis confirmed on bone marrow biopsy. She had an IPSET prognosis score of 2, which is intermediate risk and translates to a median overall survival of 17 years to 24.5 years, depending on the study. Her IPSET thrombosis score is high risk based on age and JAK2 mutation, with a 3.6%/year risk of thrombosis. She began hydrea therapy 4/2019, with goal of reducing her platelets into the normal range to reduce her risk of thrombotic complications. Dose increased to 1000mg PO daily in 2/2021 when PLT lianne to 440 and after developing a CVA. Labs remain at goal, even after cutting her dose back to 500mg on M/W/F and 1000mg on other days. We will continue with this dose. The patient will be seen every 3 months while on therapy, and labs will be monitored to assess for toxicity from therapy. Interestingly, her mother had PV, so there may be a familial component to her illness. 2) Mouth sores  Intermittent, possibly from hydrea. Overall improved. Have discussed salt water rinses, OTC oragel PRN    3) H/o CVA  On plavix. Seen by neurology. 4) Fatigue  Much improved lately. Perhaps from depression, which is under better control. 5) Dizziness  Resolved after stopping some of her medications.     6) Emotional Well Being  No psychosocial concerns identified today. Patient has adequate support.      Plan:     Continue Hydroxyurea 500mg on M/W/F and 1000mg on other days  Labs in 3 months: CBC and CMP (She prefers [employer])  Return to see me in 3 months      Signed By: Mic Radford MD

## 2022-08-10 ENCOUNTER — OFFICE VISIT (OUTPATIENT)
Dept: ONCOLOGY | Age: 77
End: 2022-08-10
Payer: MEDICARE

## 2022-08-10 VITALS
TEMPERATURE: 96.4 F | HEART RATE: 88 BPM | SYSTOLIC BLOOD PRESSURE: 123 MMHG | DIASTOLIC BLOOD PRESSURE: 54 MMHG | WEIGHT: 151 LBS | BODY MASS INDEX: 25.78 KG/M2 | OXYGEN SATURATION: 98 % | HEIGHT: 64 IN | RESPIRATION RATE: 18 BRPM

## 2022-08-10 DIAGNOSIS — D47.3 ESSENTIAL THROMBOCYTOSIS (HCC): Primary | ICD-10-CM

## 2022-08-10 PROCEDURE — G9717 DOC PT DX DEP/BP F/U NT REQ: HCPCS | Performed by: INTERNAL MEDICINE

## 2022-08-10 PROCEDURE — G8752 SYS BP LESS 140: HCPCS | Performed by: INTERNAL MEDICINE

## 2022-08-10 PROCEDURE — G8754 DIAS BP LESS 90: HCPCS | Performed by: INTERNAL MEDICINE

## 2022-08-10 PROCEDURE — G8536 NO DOC ELDER MAL SCRN: HCPCS | Performed by: INTERNAL MEDICINE

## 2022-08-10 PROCEDURE — 1090F PRES/ABSN URINE INCON ASSESS: CPT | Performed by: INTERNAL MEDICINE

## 2022-08-10 PROCEDURE — 99214 OFFICE O/P EST MOD 30 MIN: CPT | Performed by: INTERNAL MEDICINE

## 2022-08-10 PROCEDURE — G0463 HOSPITAL OUTPT CLINIC VISIT: HCPCS | Performed by: INTERNAL MEDICINE

## 2022-08-10 PROCEDURE — G8417 CALC BMI ABV UP PARAM F/U: HCPCS | Performed by: INTERNAL MEDICINE

## 2022-08-10 PROCEDURE — 1101F PT FALLS ASSESS-DOCD LE1/YR: CPT | Performed by: INTERNAL MEDICINE

## 2022-08-10 PROCEDURE — G8399 PT W/DXA RESULTS DOCUMENT: HCPCS | Performed by: INTERNAL MEDICINE

## 2022-08-10 PROCEDURE — G8427 DOCREV CUR MEDS BY ELIG CLIN: HCPCS | Performed by: INTERNAL MEDICINE

## 2022-08-10 PROCEDURE — 1123F ACP DISCUSS/DSCN MKR DOCD: CPT | Performed by: INTERNAL MEDICINE

## 2022-08-10 RX ORDER — TRIMETHOPRIM 100 MG/1
100 TABLET ORAL DAILY
COMMUNITY
Start: 2022-07-15

## 2022-08-10 NOTE — PROGRESS NOTES
Dandy Zambrano is a 76 y.o. female follow up for ET. 1. Have you been to the ER, urgent care clinic since your last visit? Hospitalized since your last visit?no    2. Have you seen or consulted any other health care providers outside of the [address] [place] [address] since your last visit? Include any pap smears or colon screening.  no

## 2022-11-07 NOTE — PROGRESS NOTES
[facility] at [address], [address] [facility] [address] [facility]  January Escobarila: [phone]  F: [phone]      Reason for Visit:   Catia Singh is a 76 y.o. female who is seen for follow up of essential thrombocythemia. Treatment History:   Presented with thrombocytosis  JAK2 V617 mutation positive on 2/28/2019  Bone marrow biopsy 4/9/2019: JAK2 positive myeloproliferative neoplasm, most consistent with essential thrombocythemia. Essential Thrombocythemia  Hydrea started 4/13/2019    History of Present Illness:   She changed to a virtual visit today as she has a URI for the past week. No fevers. Feeling better today. Did home COVID19 test which was negative. She remains on her hydrea alternating 500/1000mg dosing. No new issues from this. Still having significant bruising on her legs. Her mother had polycythemia vera, required therapeutic phlebotomy. Review of systems was obtained and pertinent findings reviewed above. Past medical history, social history, family history, medications, and allergies are located in the electronic medical record. Physical Exam:     There were no vitals taken for this visit. General: alert, cooperative, no distress   Mental  status: normal mood, behavior, speech, dress, motor activity, and thought processes, able to follow commands   HENT: NCAT   Neck: no visualized mass   Resp: no respiratory distress   Neuro: no gross deficits   Skin: no discoloration or lesions of concern on visible areas   Psychiatric: normal affect, consistent with stated mood, no evidence of hallucinations       Due to this being a TeleHealth evaluation (During QVILF-82 public health emergency), many elements of the physical examination are unable to be assessed. Evaluation of the following organ systems was limited: Vitals/Constitutional/EENT/Resp/CV/GI//MS/Neuro/Skin/Heme-Lymph-Imm.         Results:     Lab Results   Component Value Date/Time WBC 3.6 11/09/2022 02:22 PM    HGB 12.1 11/09/2022 02:22 PM    HCT 36.9 11/09/2022 02:22 PM    PLATELET 018 71/46/4039 02:22 PM    .8 (H) 11/09/2022 02:22 PM    ABS. NEUTROPHILS 1.3 (L) 11/09/2022 02:22 PM     Lab Results   Component Value Date/Time    Sodium 144 11/09/2022 02:22 PM    Potassium 4.2 11/09/2022 02:22 PM    Chloride 108 11/09/2022 02:22 PM    CO2 31 11/09/2022 02:22 PM    Glucose 92 11/09/2022 02:22 PM    BUN 16 11/09/2022 02:22 PM    Creatinine 0.63 11/09/2022 02:22 PM    GFR est AA >60 08/04/2022 11:40 AM    GFR est non-AA >60 08/04/2022 11:40 AM    Calcium 8.5 11/09/2022 02:22 PM    Glucose (POC) 126 (H) 08/22/2012 06:54 AM     Lab Results   Component Value Date/Time    Bilirubin, total 0.2 11/09/2022 02:22 PM    ALT (SGPT) 57 11/09/2022 02:22 PM    Alk.  phosphatase 85 11/09/2022 02:22 PM    Protein, total 6.4 11/09/2022 02:22 PM    Albumin 3.3 (L) 11/09/2022 02:22 PM    Globulin 3.1 11/09/2022 02:22 PM     Lab Results   Component Value Date/Time    Iron % saturation 29 06/14/2018 10:25 AM    TIBC 312 06/14/2018 10:25 AM    Ferritin 178 (H) 06/14/2018 10:25 AM    Vitamin B12 1,688 (H) 06/14/2018 10:25 AM    Folate 18.5 06/14/2018 10:25 AM    Erythropoietin 5.1 02/28/2019 12:54 PM     02/28/2019 12:54 PM    Sed rate (ESR) 3 02/28/2019 12:54 PM    C-Reactive protein 0.50 08/02/2012 11:47 AM    C-Reactive Protein, Qt 1.1 02/28/2019 12:54 PM    TSH 0.758 06/14/2018 10:25 AM    TSH 3.02 08/02/2012 11:47 AM    Lipase 92 (H) 02/28/2019 12:54 PM     PLATELET   Date Value   11/09/2022 195 K/uL   08/04/2022 184 K/uL   06/03/2022 218 K/uL   04/18/2022 188 K/uL   01/10/2022 188 K/uL   09/28/2021 206 K/uL   06/23/2021 273 K/uL   04/26/2021 248 K/uL   03/23/2021 303 K/uL   02/15/2021 440 K/uL (H)   11/16/2020 433 K/uL (H)   08/17/2020 415 K/uL (H)   05/14/2020 425 x10E3/uL   02/14/2020 432 K/uL (H)   12/20/2019 422 K/uL (H)   11/14/2019 458 x10E3/uL (H)   08/13/2019 434 x10E3/uL   07/11/2019 434 x10E3/uL   06/11/2019 465 x10E3/uL (H)   05/09/2019 480 x10E3/uL (H)   03/28/2019 491 K/uL (H)   02/28/2019 547 x10E3/uL (H)   06/14/2018 458 x10E3/uL (H)   08/21/2012 321 K/uL   08/02/2012 298 K/uL       9/20/2018  WBC: 6.1  HGB: 14.0  PLT: 474  Smear: Platelets vary in size, otherwise unremarkable  Creatinine: 0.68  AST, ALT, ALP, TBili: wnl  Ferritin: 122  Iron saturation: 19%    2/20/2019 (from PCP)  WBC: 7.3  HGB: 14.3  PLT: 498    2/20/2019 (patient supplied)  WBC: 7.8  HGB: 14.2  HCT: 43.7  PLT: 514  B12: 1559  Folate: 16.7  Iron sat: 25%  Ferritin: 125  Copper: 94  Zinc: 90  TSH: 1.62    JAK2 2/28/2019: POSITIVE for the detection of the V617F mutation. 4/4/2022  WBC: 2.8  HGB: 11.8  PLT: 197  Creatinine: 0.65  AST, ALT, ALP, TBili: wnl      Bone marrow biopsy 4/9/2019: estimated normocellular marrow for age with maturing trilineage hematopoiesis, mildly increased megakaryocytes and no increase in blasts. Preliminary comment:  Findings are of a JAK2 positive myeloproliferative neoplasm, most consistent with essential thrombocythemia. Assessment:   1) Essential Thrombocythemia  JAK2+  Diagnosis confirmed on bone marrow biopsy. She had an IPSET prognosis score of 2, which is intermediate risk and translates to a median overall survival of 17 years to 24.5 years, depending on the study. Her IPSET thrombosis score is high risk based on age and JAK2 mutation, with a 3.6%/year risk of thrombosis. She began hydrea therapy 4/2019, with goal of reducing her platelets into the normal range to reduce her risk of thrombotic complications. Dose increased to 1000mg PO daily in 2/2021 when PLT lianne to 440 and after developing a CVA. Dose reduced for fatigue to 500mg on M/W/F and 1000mg on other days starting 4/2022. She is tolerating the current dose well. Counts remain at goal.  We will continue with therapy.     The patient will be seen every 3 months while on therapy, and labs will be monitored to assess for toxicity from therapy. Interestingly, her mother had PV, so there may be a familial component to her illness. 2) Mouth sores  Intermittent, possibly from hydrea. Overall improved. Have discussed salt water rinses, OTC oragel PRN    3) H/o CVA  On plavix. Seen by neurology. 4) Fatigue  Much improved lately. Perhaps from depression, which is under better control. 5) Emotional Well Being  No psychosocial concerns identified today. Patient has adequate support.      Plan:     Continue Hydroxyurea 500mg on M/W/F and 1000mg on other days  Labs in 3 months: CBC and CMP (She prefers [employer])  Return to see me in 3 months      Signed By: Randolph Webb MD

## 2022-11-11 ENCOUNTER — VIRTUAL VISIT (OUTPATIENT)
Dept: ONCOLOGY | Age: 77
End: 2022-11-11
Payer: MEDICARE

## 2022-11-11 DIAGNOSIS — D47.3 ESSENTIAL THROMBOCYTOSIS (HCC): Primary | ICD-10-CM

## 2022-11-11 PROCEDURE — G0463 HOSPITAL OUTPT CLINIC VISIT: HCPCS | Performed by: INTERNAL MEDICINE

## 2022-11-11 PROCEDURE — 1101F PT FALLS ASSESS-DOCD LE1/YR: CPT | Performed by: INTERNAL MEDICINE

## 2022-11-11 PROCEDURE — G8536 NO DOC ELDER MAL SCRN: HCPCS | Performed by: INTERNAL MEDICINE

## 2022-11-11 PROCEDURE — G8427 DOCREV CUR MEDS BY ELIG CLIN: HCPCS | Performed by: INTERNAL MEDICINE

## 2022-11-11 PROCEDURE — G8756 NO BP MEASURE DOC: HCPCS | Performed by: INTERNAL MEDICINE

## 2022-11-11 PROCEDURE — 99214 OFFICE O/P EST MOD 30 MIN: CPT | Performed by: INTERNAL MEDICINE

## 2022-11-11 PROCEDURE — G9717 DOC PT DX DEP/BP F/U NT REQ: HCPCS | Performed by: INTERNAL MEDICINE

## 2022-11-11 PROCEDURE — G8399 PT W/DXA RESULTS DOCUMENT: HCPCS | Performed by: INTERNAL MEDICINE

## 2022-11-11 PROCEDURE — 1123F ACP DISCUSS/DSCN MKR DOCD: CPT | Performed by: INTERNAL MEDICINE

## 2022-11-11 PROCEDURE — G8417 CALC BMI ABV UP PARAM F/U: HCPCS | Performed by: INTERNAL MEDICINE

## 2022-11-11 PROCEDURE — 1090F PRES/ABSN URINE INCON ASSESS: CPT | Performed by: INTERNAL MEDICINE

## 2022-11-11 RX ORDER — SEMAGLUTIDE 1.34 MG/ML
INJECTION, SOLUTION SUBCUTANEOUS
COMMUNITY
Start: 2022-10-28

## 2022-11-11 NOTE — PROGRESS NOTES
Yosvany Maciel is a 76 y.o. female follow up for ET. 1. Have you been to the ER, urgent care clinic since your last visit? Hospitalized since your last visit?no    2. Have you seen or consulted any other health care providers outside of the [address] [place] [address] since your last visit? Include any pap smears or colon screening.  no

## 2022-11-11 NOTE — PATIENT INSTRUCTIONS
Thank you for participating in the virtual visit with Dr. Michael White. We will call you soon to schedule a follow up appointment with us in 3 months. If you do not hear from us, please call us at [phone] to schedule your appointment. Please use the enclosed lab slip to have your labs done before your next appointment.

## 2023-02-06 NOTE — PROGRESS NOTES
[facility] [place] at [place] [address] [facility] [address] [facility]  Rachael David: [phone]  F: [phone]      Reason for Visit:   Lyndsay Rubio is a 76 y.o. female who is seen for follow up of essential thrombocythemia. Treatment History:   Presented with thrombocytosis  JAK2 V617 mutation positive on 2/28/2019  Bone marrow biopsy 4/9/2019: JAK2 positive myeloproliferative neoplasm, most consistent with essential thrombocythemia. Essential Thrombocythemia  Hydrea started 4/13/2019 2/2021 Increased to 1000mg daily  4/2022 Reduced to 500mg M/W/F and 1000mg other days    History of Present Illness:   She is having some bruising on her right arm after falling on the steps and injuring her arm. She remains on the same dose of hydrea. Energy is fair. Remains on plavix. Her mother had polycythemia vera, required therapeutic phlebotomy. Review of systems was obtained and pertinent findings reviewed above. Past medical history, social history, family history, medications, and allergies are located in the electronic medical record. Physical Exam:     Visit Vitals  BP (!) 123/58 (BP 1 Location: Left upper arm, BP Patient Position: Sitting, BP Cuff Size: Large adult) Comment: .    Pulse 75   Temp 97.4 °F (36.3 °C) (Temporal)   Resp 18   Ht 5' 4\" (1.626 m)   Wt 158 lb 12.8 oz (72 kg)   SpO2 97%   BMI 27.26 kg/m²       General: alert, cooperative, no distress   Mental  status: normal mood, behavior, speech, dress, motor activity, and thought processes, able to follow commands   HENT: NCAT   Neck: no visualized mass   Resp: no respiratory distress   Neuro: no gross deficits   Skin: no discoloration or lesions of concern on visible areas   Psychiatric: normal affect, consistent with stated mood, no evidence of hallucinations       Due to this being a TeleHealth evaluation (During VYLGL-78 public health emergency), many elements of the physical examination are unable to be assessed. Evaluation of the following organ systems was limited: Vitals/Constitutional/EENT/Resp/CV/GI//MS/Neuro/Skin/Heme-Lymph-Imm. Results:     Lab Results   Component Value Date/Time    WBC 4.6 02/08/2023 10:03 AM    HGB 13.2 02/08/2023 10:03 AM    HCT 42.0 02/08/2023 10:03 AM    PLATELET 959 74/28/7365 10:03 AM    .4 (H) 02/08/2023 10:03 AM    ABS. NEUTROPHILS 2.3 02/08/2023 10:03 AM     Lab Results   Component Value Date/Time    Sodium 141 02/08/2023 10:03 AM    Potassium 4.9 02/08/2023 10:03 AM    Chloride 106 02/08/2023 10:03 AM    CO2 31 02/08/2023 10:03 AM    Glucose 82 02/08/2023 10:03 AM    BUN 17 02/08/2023 10:03 AM    Creatinine 0.66 02/08/2023 10:03 AM    GFR est AA >60 08/04/2022 11:40 AM    GFR est non-AA >60 08/04/2022 11:40 AM    Calcium 9.1 02/08/2023 10:03 AM    Glucose (POC) 126 (H) 08/22/2012 06:54 AM     Lab Results   Component Value Date/Time    Bilirubin, total 0.4 02/08/2023 10:03 AM    ALT (SGPT) 66 02/08/2023 10:03 AM    Alk.  phosphatase 82 02/08/2023 10:03 AM    Protein, total 6.4 02/08/2023 10:03 AM    Albumin 3.6 02/08/2023 10:03 AM    Globulin 2.8 02/08/2023 10:03 AM     Lab Results   Component Value Date/Time    Iron % saturation 29 06/14/2018 10:25 AM    TIBC 312 06/14/2018 10:25 AM    Ferritin 178 (H) 06/14/2018 10:25 AM    Vitamin B12 1,688 (H) 06/14/2018 10:25 AM    Folate 18.5 06/14/2018 10:25 AM    Erythropoietin 5.1 02/28/2019 12:54 PM     02/28/2019 12:54 PM    Sed rate (ESR) 3 02/28/2019 12:54 PM    C-Reactive protein 0.50 08/02/2012 11:47 AM    C-Reactive Protein, Qt 1.1 02/28/2019 12:54 PM    TSH 0.758 06/14/2018 10:25 AM    TSH 3.02 08/02/2012 11:47 AM    Lipase 92 (H) 02/28/2019 12:54 PM     PLATELET   Date Value   02/08/2023 236 K/uL   11/09/2022 195 K/uL   08/04/2022 184 K/uL   06/03/2022 218 K/uL   04/18/2022 188 K/uL   01/10/2022 188 K/uL   09/28/2021 206 K/uL   06/23/2021 273 K/uL   04/26/2021 248 K/uL   03/23/2021 303 K/uL   02/15/2021 440 K/uL (H)   11/16/2020 433 K/uL (H)   08/17/2020 415 K/uL (H)   05/14/2020 425 x10E3/uL   02/14/2020 432 K/uL (H)   12/20/2019 422 K/uL (H)   11/14/2019 458 x10E3/uL (H)   08/13/2019 434 x10E3/uL   07/11/2019 434 x10E3/uL   06/11/2019 465 x10E3/uL (H)   05/09/2019 480 x10E3/uL (H)   03/28/2019 491 K/uL (H)   02/28/2019 547 x10E3/uL (H)   06/14/2018 458 x10E3/uL (H)   08/21/2012 321 K/uL   08/02/2012 298 K/uL       9/20/2018  WBC: 6.1  HGB: 14.0  PLT: 474  Smear: Platelets vary in size, otherwise unremarkable  Creatinine: 0.68  AST, ALT, ALP, TBili: wnl  Ferritin: 122  Iron saturation: 19%    2/20/2019 (from PCP)  WBC: 7.3  HGB: 14.3  PLT: 498    2/20/2019 (patient supplied)  WBC: 7.8  HGB: 14.2  HCT: 43.7  PLT: 514  B12: 1559  Folate: 16.7  Iron sat: 25%  Ferritin: 125  Copper: 94  Zinc: 90  TSH: 1.62    JAK2 2/28/2019: POSITIVE for the detection of the V617F mutation. 4/4/2022  WBC: 2.8  HGB: 11.8  PLT: 197  Creatinine: 0.65  AST, ALT, ALP, TBili: wnl      Bone marrow biopsy 4/9/2019: estimated normocellular marrow for age with maturing trilineage hematopoiesis, mildly increased megakaryocytes and no increase in blasts. Preliminary comment:  Findings are of a JAK2 positive myeloproliferative neoplasm, most consistent with essential thrombocythemia. Assessment:   1) Essential Thrombocythemia  JAK2+  Diagnosis confirmed on bone marrow biopsy. She had an IPSET prognosis score of 2, which is intermediate risk and translates to a median overall survival of 17 years to 24.5 years, depending on the study. Her IPSET thrombosis score is high risk based on age and JAK2 mutation, with a 3.6%/year risk of thrombosis. She began hydrea therapy 4/2019, with goal of reducing her platelets into the normal range to reduce her risk of thrombotic complications. Dose increased to 1000mg PO daily in 2/2021 when PLT lianne to 440 and after developing a CVA.   Dose reduced for fatigue to 500mg on M/W/F and 1000mg on other days starting 4/2022. Her labs remain at goal on current dose. We will continue with therapy. The patient will be seen every 3 months while on therapy, and labs will be monitored to assess for toxicity from therapy. Interestingly, her mother had PV, so there may be a familial component to her illness. 2) Mouth sores  Intermittent, possibly from hydrea. Overall improved. Have discussed salt water rinses, OTC oragel PRN. Better recently. 3) H/o CVA  On plavix. Seen by neurology. 4) Fatigue  Improved lately. Perhaps from depression, which is under better control. 5) Bruising  Secondary to plavix and recent fall. 6) Emotional Well Being  No psychosocial concerns identified today. Patient has adequate support.      Plan:     Continue Hydroxyurea 500mg on M/W/F and 1000mg on other days  Labs in 3 months: CBC and CMP (She prefers [employer])  Return to see me in 3 months      Signed By: Basilia Mcclure MD

## 2023-02-10 ENCOUNTER — OFFICE VISIT (OUTPATIENT)
Dept: ONCOLOGY | Age: 78
End: 2023-02-10
Payer: MEDICARE

## 2023-02-10 VITALS
WEIGHT: 158.8 LBS | SYSTOLIC BLOOD PRESSURE: 123 MMHG | HEIGHT: 64 IN | RESPIRATION RATE: 18 BRPM | OXYGEN SATURATION: 97 % | BODY MASS INDEX: 27.11 KG/M2 | TEMPERATURE: 97.4 F | DIASTOLIC BLOOD PRESSURE: 58 MMHG | HEART RATE: 75 BPM

## 2023-02-10 DIAGNOSIS — D47.3 ESSENTIAL THROMBOCYTOSIS (HCC): Primary | ICD-10-CM

## 2023-02-10 RX ORDER — ORAL SEMAGLUTIDE 14 MG/1
14 TABLET ORAL DAILY
COMMUNITY
Start: 2023-01-12

## 2023-02-10 NOTE — PROGRESS NOTES
Lyndsay Joanne is a 76 y.o. female follow up for ET. 1. Have you been to the ER, urgent care clinic since your last visit? Hospitalized since your last visit?no     2. Have you seen or consulted any other health care providers outside of the [address] [place] [address] since your last visit? Include any pap smears or colon screening.  no

## 2023-04-22 DIAGNOSIS — D47.3 ESSENTIAL THROMBOCYTOSIS (HCC): Primary | ICD-10-CM

## 2023-04-24 DIAGNOSIS — D47.3 ESSENTIAL THROMBOCYTOSIS (HCC): Primary | ICD-10-CM

## 2023-05-23 DIAGNOSIS — D47.3 ESSENTIAL THROMBOCYTOSIS (HCC): ICD-10-CM

## 2023-05-24 LAB
ALBUMIN SERPL-MCNC: 3.5 G/DL (ref 3.5–5)
ALBUMIN/GLOB SERPL: 1.2 (ref 1.1–2.2)
ALP SERPL-CCNC: 83 U/L (ref 45–117)
ALT SERPL-CCNC: 52 U/L (ref 12–78)
ANION GAP SERPL CALC-SCNC: 3 MMOL/L (ref 5–15)
AST SERPL-CCNC: 50 U/L (ref 15–37)
BASOPHILS # BLD: 0 K/UL (ref 0–0.1)
BASOPHILS NFR BLD: 0 % (ref 0–1)
BILIRUB SERPL-MCNC: 0.4 MG/DL (ref 0.2–1)
BUN SERPL-MCNC: 18 MG/DL (ref 6–20)
BUN/CREAT SERPL: 26 (ref 12–20)
CALCIUM SERPL-MCNC: 8.7 MG/DL (ref 8.5–10.1)
CHLORIDE SERPL-SCNC: 106 MMOL/L (ref 97–108)
CO2 SERPL-SCNC: 31 MMOL/L (ref 21–32)
CREAT SERPL-MCNC: 0.68 MG/DL (ref 0.55–1.02)
DIFFERENTIAL METHOD BLD: ABNORMAL
EOSINOPHIL # BLD: 0 K/UL (ref 0–0.4)
EOSINOPHIL NFR BLD: 1 % (ref 0–7)
ERYTHROCYTE [DISTWIDTH] IN BLOOD BY AUTOMATED COUNT: 13.1 % (ref 11.5–14.5)
GLOBULIN SER CALC-MCNC: 2.9 G/DL (ref 2–4)
GLUCOSE SERPL-MCNC: 81 MG/DL (ref 65–100)
HCT VFR BLD AUTO: 41.4 % (ref 35–47)
HGB BLD-MCNC: 13.4 G/DL (ref 11.5–16)
IMM GRANULOCYTES # BLD AUTO: 0 K/UL (ref 0–0.04)
IMM GRANULOCYTES NFR BLD AUTO: 0 % (ref 0–0.5)
LYMPHOCYTES # BLD: 1.9 K/UL (ref 0.8–3.5)
LYMPHOCYTES NFR BLD: 42 % (ref 12–49)
MCH RBC QN AUTO: 36.6 PG (ref 26–34)
MCHC RBC AUTO-ENTMCNC: 32.4 G/DL (ref 30–36.5)
MCV RBC AUTO: 113.1 FL (ref 80–99)
MONOCYTES # BLD: 0.4 K/UL (ref 0–1)
MONOCYTES NFR BLD: 8 % (ref 5–13)
NEUTS SEG # BLD: 2.3 K/UL (ref 1.8–8)
NEUTS SEG NFR BLD: 49 % (ref 32–75)
NRBC # BLD: 0 K/UL (ref 0–0.01)
NRBC BLD-RTO: 0 PER 100 WBC
PLATELET # BLD AUTO: 198 K/UL (ref 150–400)
PMV BLD AUTO: 10.1 FL (ref 8.9–12.9)
POTASSIUM SERPL-SCNC: 4.3 MMOL/L (ref 3.5–5.1)
PROT SERPL-MCNC: 6.4 G/DL (ref 6.4–8.2)
RBC # BLD AUTO: 3.66 M/UL (ref 3.8–5.2)
RBC MORPH BLD: ABNORMAL
SODIUM SERPL-SCNC: 140 MMOL/L (ref 136–145)
WBC # BLD AUTO: 4.6 K/UL (ref 3.6–11)

## 2023-05-26 ENCOUNTER — OFFICE VISIT (OUTPATIENT)
Age: 78
End: 2023-05-26
Payer: MEDICARE

## 2023-05-26 VITALS
BODY MASS INDEX: 26.46 KG/M2 | RESPIRATION RATE: 16 BRPM | OXYGEN SATURATION: 93 % | WEIGHT: 155 LBS | TEMPERATURE: 97.3 F | HEART RATE: 76 BPM | DIASTOLIC BLOOD PRESSURE: 65 MMHG | SYSTOLIC BLOOD PRESSURE: 141 MMHG | HEIGHT: 64 IN

## 2023-05-26 DIAGNOSIS — D47.3 ESSENTIAL THROMBOCYTOSIS (HCC): Primary | ICD-10-CM

## 2023-05-26 PROCEDURE — 1123F ACP DISCUSS/DSCN MKR DOCD: CPT | Performed by: INTERNAL MEDICINE

## 2023-05-26 PROCEDURE — 99214 OFFICE O/P EST MOD 30 MIN: CPT | Performed by: INTERNAL MEDICINE

## 2023-05-26 PROCEDURE — 1090F PRES/ABSN URINE INCON ASSESS: CPT | Performed by: INTERNAL MEDICINE

## 2023-05-26 PROCEDURE — 3078F DIAST BP <80 MM HG: CPT | Performed by: INTERNAL MEDICINE

## 2023-05-26 PROCEDURE — 3077F SYST BP >= 140 MM HG: CPT | Performed by: INTERNAL MEDICINE

## 2023-05-26 PROCEDURE — G8427 DOCREV CUR MEDS BY ELIG CLIN: HCPCS | Performed by: INTERNAL MEDICINE

## 2023-05-26 PROCEDURE — G8419 CALC BMI OUT NRM PARAM NOF/U: HCPCS | Performed by: INTERNAL MEDICINE

## 2023-05-26 PROCEDURE — G8400 PT W/DXA NO RESULTS DOC: HCPCS | Performed by: INTERNAL MEDICINE

## 2023-05-26 PROCEDURE — 1036F TOBACCO NON-USER: CPT | Performed by: INTERNAL MEDICINE

## 2023-05-26 RX ORDER — HYDROXYUREA 500 MG/1
1000 CAPSULE ORAL DAILY
Qty: 60 CAPSULE | Refills: 11 | Status: SHIPPED | OUTPATIENT
Start: 2023-05-26

## 2023-05-26 NOTE — PROGRESS NOTES
Leelee Villarreal is a [de-identified] y.o. female here for follow up of essential thrombocythemia. 1. Have you been to the ER, urgent care clinic since your last visit? Hospitalized since your last visit? No    2. Have you seen or consulted any other health care providers outside of the [address] [place] [address] since your last visit? Include any pap smears or colon screening.  No

## 2023-07-21 PROBLEM — M81.0 OSTEOPOROSIS: Status: ACTIVE | Noted: 2023-07-21

## 2023-07-25 ENCOUNTER — HOSPITAL ENCOUNTER (OUTPATIENT)
Facility: HOSPITAL | Age: 78
Setting detail: INFUSION SERIES
End: 2023-07-25
Payer: MEDICARE

## 2023-07-25 VITALS
BODY MASS INDEX: 23.9 KG/M2 | WEIGHT: 140 LBS | DIASTOLIC BLOOD PRESSURE: 61 MMHG | HEART RATE: 72 BPM | SYSTOLIC BLOOD PRESSURE: 131 MMHG | RESPIRATION RATE: 18 BRPM | HEIGHT: 64 IN | TEMPERATURE: 97.8 F

## 2023-07-25 DIAGNOSIS — M81.0 OSTEOPOROSIS, UNSPECIFIED OSTEOPOROSIS TYPE, UNSPECIFIED PATHOLOGICAL FRACTURE PRESENCE: Primary | ICD-10-CM

## 2023-07-25 PROCEDURE — 6360000002 HC RX W HCPCS: Performed by: FAMILY MEDICINE

## 2023-07-25 PROCEDURE — 96374 THER/PROPH/DIAG INJ IV PUSH: CPT

## 2023-07-25 RX ORDER — SODIUM CHLORIDE 9 MG/ML
5-250 INJECTION, SOLUTION INTRAVENOUS PRN
OUTPATIENT
Start: 2024-07-21

## 2023-07-25 RX ORDER — SODIUM CHLORIDE 9 MG/ML
5-250 INJECTION, SOLUTION INTRAVENOUS PRN
Status: DISCONTINUED | OUTPATIENT
Start: 2023-07-25 | End: 2023-07-26 | Stop reason: HOSPADM

## 2023-07-25 RX ORDER — ZOLEDRONIC ACID 5 MG/100ML
5 INJECTION, SOLUTION INTRAVENOUS ONCE
OUTPATIENT
Start: 2024-07-21 | End: 2024-07-21

## 2023-07-25 RX ORDER — ZOLEDRONIC ACID 5 MG/100ML
5 INJECTION, SOLUTION INTRAVENOUS ONCE
Status: COMPLETED | OUTPATIENT
Start: 2023-07-25 | End: 2023-07-25

## 2023-07-25 RX ADMIN — ZOLEDRONIC ACID 5 MG: 0.05 INJECTION, SOLUTION INTRAVENOUS at 15:13

## 2023-07-25 ASSESSMENT — PAIN SCALES - GENERAL: PAINLEVEL_OUTOF10: 0

## 2023-07-25 NOTE — PROGRESS NOTES
[facility] Progress Note    Date: 2023    Name: Norma May    MRN: [id number]         : 1945    Ms. Jeramy Walsh Arrived ambulatory and in no distress for Reclast Infusion. Assessment was completed, no acute issues at this time, no new complaints voiced. 24 G PIV established to right arm, + blood return. Labs reviewed in media, within parameters for treatment. Patient denies dental work. Ms. Tao Pinzon vitals were reviewed. Vitals:    23 1530   BP: 131/61   Pulse:    Resp:    Temp:        Medications:  Medications Administered         zoledronic acid (RECLAST) 5 mg/100 mL infusion Admin Date  2023 Action  New Bag Dose  5 mg Rate  400 mL/hr Route  IntraVENous Administered By  Na Christensen RN            Ms. Jeramy Walsh tolerated treatment well and was discharged from [address] [place] [address] in stable condition. PIV flushed & removed. Patient is aware of future appointments. Future Appointments   Date Time Provider [address] [place] [address]   2023 10:00 AM Migdalia Mancilla MD [facility] BS AMB   2024  3:00 PM SS INF5 CH4 <1H [facility]       Na Christensen RN  2023

## 2023-09-06 ENCOUNTER — TELEPHONE (OUTPATIENT)
Age: 78
End: 2023-09-06

## 2023-09-06 NOTE — TELEPHONE ENCOUNTER
Ghulam Santiago at [facility]  [phone]    09/06/23- Phone call placed to pt to remind pt to have labs drawn prior to her follow up appointment with . Pt verbalized understanding.

## 2023-09-12 DIAGNOSIS — D47.3 ESSENTIAL THROMBOCYTOSIS (HCC): ICD-10-CM

## 2023-09-12 LAB
ALBUMIN SERPL-MCNC: 3.7 G/DL (ref 3.5–5)
ALBUMIN/GLOB SERPL: 1.3 (ref 1.1–2.2)
ALP SERPL-CCNC: 68 U/L (ref 45–117)
ALT SERPL-CCNC: 38 U/L (ref 12–78)
ANION GAP SERPL CALC-SCNC: 7 MMOL/L (ref 5–15)
AST SERPL-CCNC: 25 U/L (ref 15–37)
BASOPHILS # BLD: 0.1 K/UL (ref 0–0.1)
BASOPHILS NFR BLD: 1 % (ref 0–1)
BILIRUB SERPL-MCNC: 0.4 MG/DL (ref 0.2–1)
BUN SERPL-MCNC: 15 MG/DL (ref 6–20)
BUN/CREAT SERPL: 19 (ref 12–20)
CALCIUM SERPL-MCNC: 8.4 MG/DL (ref 8.5–10.1)
CHLORIDE SERPL-SCNC: 105 MMOL/L (ref 97–108)
CO2 SERPL-SCNC: 29 MMOL/L (ref 21–32)
CREAT SERPL-MCNC: 0.77 MG/DL (ref 0.55–1.02)
DIFFERENTIAL METHOD BLD: ABNORMAL
EOSINOPHIL # BLD: 0.1 K/UL (ref 0–0.4)
EOSINOPHIL NFR BLD: 1 % (ref 0–7)
ERYTHROCYTE [DISTWIDTH] IN BLOOD BY AUTOMATED COUNT: 13.5 % (ref 11.5–14.5)
GLOBULIN SER CALC-MCNC: 2.9 G/DL (ref 2–4)
GLUCOSE SERPL-MCNC: 89 MG/DL (ref 65–100)
HCT VFR BLD AUTO: 42.4 % (ref 35–47)
HGB BLD-MCNC: 13.6 G/DL (ref 11.5–16)
IMM GRANULOCYTES # BLD AUTO: 0 K/UL (ref 0–0.04)
IMM GRANULOCYTES NFR BLD AUTO: 0 % (ref 0–0.5)
LYMPHOCYTES # BLD: 2.7 K/UL (ref 0.8–3.5)
LYMPHOCYTES NFR BLD: 46 % (ref 12–49)
MCH RBC QN AUTO: 36.2 PG (ref 26–34)
MCHC RBC AUTO-ENTMCNC: 32.1 G/DL (ref 30–36.5)
MCV RBC AUTO: 112.8 FL (ref 80–99)
MONOCYTES # BLD: 0.4 K/UL (ref 0–1)
MONOCYTES NFR BLD: 6 % (ref 5–13)
NEUTS SEG # BLD: 2.6 K/UL (ref 1.8–8)
NEUTS SEG NFR BLD: 46 % (ref 32–75)
NRBC # BLD: 0 K/UL (ref 0–0.01)
NRBC BLD-RTO: 0 PER 100 WBC
PLATELET # BLD AUTO: 297 K/UL (ref 150–400)
PMV BLD AUTO: 9.8 FL (ref 8.9–12.9)
POTASSIUM SERPL-SCNC: 4.5 MMOL/L (ref 3.5–5.1)
PROT SERPL-MCNC: 6.6 G/DL (ref 6.4–8.2)
RBC # BLD AUTO: 3.76 M/UL (ref 3.8–5.2)
RBC MORPH BLD: ABNORMAL
SODIUM SERPL-SCNC: 141 MMOL/L (ref 136–145)
WBC # BLD AUTO: 5.9 K/UL (ref 3.6–11)

## 2023-09-20 ENCOUNTER — OFFICE VISIT (OUTPATIENT)
Age: 78
End: 2023-09-20
Payer: MEDICARE

## 2023-09-20 VITALS
WEIGHT: 145 LBS | SYSTOLIC BLOOD PRESSURE: 133 MMHG | DIASTOLIC BLOOD PRESSURE: 67 MMHG | RESPIRATION RATE: 16 BRPM | HEIGHT: 64 IN | BODY MASS INDEX: 24.75 KG/M2 | OXYGEN SATURATION: 97 % | TEMPERATURE: 97.3 F | HEART RATE: 74 BPM

## 2023-09-20 DIAGNOSIS — D47.3 ESSENTIAL THROMBOCYTOSIS (HCC): Primary | ICD-10-CM

## 2023-09-20 PROCEDURE — G8400 PT W/DXA NO RESULTS DOC: HCPCS | Performed by: INTERNAL MEDICINE

## 2023-09-20 PROCEDURE — G8427 DOCREV CUR MEDS BY ELIG CLIN: HCPCS | Performed by: INTERNAL MEDICINE

## 2023-09-20 PROCEDURE — 3075F SYST BP GE 130 - 139MM HG: CPT | Performed by: INTERNAL MEDICINE

## 2023-09-20 PROCEDURE — 99214 OFFICE O/P EST MOD 30 MIN: CPT | Performed by: INTERNAL MEDICINE

## 2023-09-20 PROCEDURE — 1090F PRES/ABSN URINE INCON ASSESS: CPT | Performed by: INTERNAL MEDICINE

## 2023-09-20 PROCEDURE — 1123F ACP DISCUSS/DSCN MKR DOCD: CPT | Performed by: INTERNAL MEDICINE

## 2023-09-20 PROCEDURE — 1036F TOBACCO NON-USER: CPT | Performed by: INTERNAL MEDICINE

## 2023-09-20 PROCEDURE — 3078F DIAST BP <80 MM HG: CPT | Performed by: INTERNAL MEDICINE

## 2023-09-20 PROCEDURE — G8420 CALC BMI NORM PARAMETERS: HCPCS | Performed by: INTERNAL MEDICINE

## 2023-09-20 RX ORDER — VIT A/VIT C/VIT E/ZINC/COPPER 4296-226
1 CAPSULE ORAL 2 TIMES DAILY
COMMUNITY

## 2023-09-20 RX ORDER — SEMAGLUTIDE 1.34 MG/ML
INJECTION, SOLUTION SUBCUTANEOUS
COMMUNITY
Start: 2023-08-16

## 2023-09-20 RX ORDER — VENLAFAXINE HYDROCHLORIDE 150 MG/1
150 CAPSULE, EXTENDED RELEASE ORAL EVERY MORNING
COMMUNITY
Start: 2023-09-13

## 2023-09-20 NOTE — PROGRESS NOTES
Salina Ramón is a 78 y.o. female follow up for ET. 1. Have you been to the ER, urgent care clinic since your last visit? Hospitalized since your last visit?no    2. Have you seen or consulted any other health care providers outside of the [address] [place] [address] since your last visit? Include any pap smears or colon screening.  no

## 2024-01-18 DIAGNOSIS — D47.3 ESSENTIAL THROMBOCYTOSIS (HCC): ICD-10-CM

## 2024-01-18 LAB
ALBUMIN SERPL-MCNC: 3.7 G/DL (ref 3.5–5)
ALBUMIN/GLOB SERPL: 1.3 (ref 1.1–2.2)
ALP SERPL-CCNC: 66 U/L (ref 45–117)
ALT SERPL-CCNC: 54 U/L (ref 12–78)
ANION GAP SERPL CALC-SCNC: 6 MMOL/L (ref 5–15)
AST SERPL-CCNC: 47 U/L (ref 15–37)
BASOPHILS # BLD: 0 K/UL (ref 0–0.1)
BASOPHILS NFR BLD: 0 % (ref 0–1)
BILIRUB SERPL-MCNC: 0.4 MG/DL (ref 0.2–1)
BUN SERPL-MCNC: 18 MG/DL (ref 6–20)
BUN/CREAT SERPL: 26 (ref 12–20)
CALCIUM SERPL-MCNC: 9 MG/DL (ref 8.5–10.1)
CHLORIDE SERPL-SCNC: 102 MMOL/L (ref 97–108)
CO2 SERPL-SCNC: 30 MMOL/L (ref 21–32)
CREAT SERPL-MCNC: 0.68 MG/DL (ref 0.55–1.02)
DIFFERENTIAL METHOD BLD: ABNORMAL
EOSINOPHIL # BLD: 0.1 K/UL (ref 0–0.4)
EOSINOPHIL NFR BLD: 1 % (ref 0–7)
ERYTHROCYTE [DISTWIDTH] IN BLOOD BY AUTOMATED COUNT: 14 % (ref 11.5–14.5)
GLOBULIN SER CALC-MCNC: 2.9 G/DL (ref 2–4)
GLUCOSE SERPL-MCNC: 89 MG/DL (ref 65–100)
HCT VFR BLD AUTO: 39 % (ref 35–47)
HGB BLD-MCNC: 13.1 G/DL (ref 11.5–16)
IMM GRANULOCYTES # BLD AUTO: 0 K/UL (ref 0–0.04)
IMM GRANULOCYTES NFR BLD AUTO: 0 % (ref 0–0.5)
LYMPHOCYTES # BLD: 2 K/UL (ref 0.8–3.5)
LYMPHOCYTES NFR BLD: 36 % (ref 12–49)
MCH RBC QN AUTO: 37.8 PG (ref 26–34)
MCHC RBC AUTO-ENTMCNC: 33.6 G/DL (ref 30–36.5)
MCV RBC AUTO: 112.4 FL (ref 80–99)
MONOCYTES # BLD: 0.3 K/UL (ref 0–1)
MONOCYTES NFR BLD: 6 % (ref 5–13)
NEUTS SEG # BLD: 3.1 K/UL (ref 1.8–8)
NEUTS SEG NFR BLD: 57 % (ref 32–75)
NRBC # BLD: 0 K/UL (ref 0–0.01)
NRBC BLD-RTO: 0 PER 100 WBC
PLATELET # BLD AUTO: 357 K/UL (ref 150–400)
PMV BLD AUTO: 9.4 FL (ref 8.9–12.9)
POTASSIUM SERPL-SCNC: 3.8 MMOL/L (ref 3.5–5.1)
PROT SERPL-MCNC: 6.6 G/DL (ref 6.4–8.2)
RBC # BLD AUTO: 3.47 M/UL (ref 3.8–5.2)
RBC MORPH BLD: ABNORMAL
SODIUM SERPL-SCNC: 138 MMOL/L (ref 136–145)
WBC # BLD AUTO: 5.5 K/UL (ref 3.6–11)

## 2024-01-19 NOTE — PROGRESS NOTES
Can[facility] [place] at [facility]  [address] [address], [address] [facility] [address]  W: [phone]  F: [phone]      Reason for Visit:   Norma Romero is a 78 y.o. female who is seen today for evaluation of essential thrombocythemia.    Hematology/Oncology Treatment History:   Presented with thrombocytosis  JAK2 V617 mutation positive on 2/28/2019  Bone marrow biopsy 4/9/2019: JAK2 positive myeloproliferative neoplasm, most consistent with essential thrombocythemia.   Essential Thrombocythemia  Hydrea started 4/13/2019 2/2021 Increased to 1000mg daily  4/2022 Reduced to 500mg M/W/F and 1000mg other days    History of Present Illness:   She recently fell off the side of a loading doc ramp, landing on her face.  Broke 4 teeth and her right arm.  Had significant bleeding from nose and cuts on her chin.  Now has significant bruising on her face.        Review of systems was obtained and pertinent findings reviewed above. Past medical history, social history, family history, medications, and allergies are located in the electronic medical record.    Physical Exam:   Visit Vitals  Pulse 68   Temp 97.4 °F (36.3 °C) (Temporal)   Resp 18   SpO2 98%         General: no distress  Respiratory: normal respiratory effort  CV: no peripheral edema  Skin: no rashes; multiple ecchymoses on face; no petechiae      Results:     Lab Results   Component Value Date    WBC 5.5 01/18/2024    HGB 13.1 01/18/2024    HCT 39.0 01/18/2024     01/18/2024    .4 (H) 01/18/2024    NEUTROABS 3.1 01/18/2024     Lab Results   Component Value Date     01/18/2024    K 3.8 01/18/2024     01/18/2024    CO2 30 01/18/2024    GLUCOSE 89 01/18/2024    BUN 18 01/18/2024    CREATININE 0.68 01/18/2024    LABGLOM >60 01/18/2024    CALCIUM 9.0 01/18/2024     Lab Results   Component Value Date    BILITOT 0.4 01/18/2024    ALT 54 01/18/2024    AST 47 (H) 01/18/2024    ALKPHOS 66 01/18/2024    PROT 6.6

## 2024-01-24 ENCOUNTER — OFFICE VISIT (OUTPATIENT)
Age: 79
End: 2024-01-24
Payer: MEDICARE

## 2024-01-24 VITALS — TEMPERATURE: 97.4 F | RESPIRATION RATE: 18 BRPM | HEART RATE: 68 BPM | OXYGEN SATURATION: 98 %

## 2024-01-24 DIAGNOSIS — D47.3 ESSENTIAL THROMBOCYTOSIS (HCC): Primary | ICD-10-CM

## 2024-01-24 PROCEDURE — G8420 CALC BMI NORM PARAMETERS: HCPCS | Performed by: INTERNAL MEDICINE

## 2024-01-24 PROCEDURE — 99214 OFFICE O/P EST MOD 30 MIN: CPT | Performed by: INTERNAL MEDICINE

## 2024-01-24 PROCEDURE — 1036F TOBACCO NON-USER: CPT | Performed by: INTERNAL MEDICINE

## 2024-01-24 PROCEDURE — G8484 FLU IMMUNIZE NO ADMIN: HCPCS | Performed by: INTERNAL MEDICINE

## 2024-01-24 PROCEDURE — G2211 COMPLEX E/M VISIT ADD ON: HCPCS | Performed by: INTERNAL MEDICINE

## 2024-01-24 PROCEDURE — G8400 PT W/DXA NO RESULTS DOC: HCPCS | Performed by: INTERNAL MEDICINE

## 2024-01-24 PROCEDURE — G8427 DOCREV CUR MEDS BY ELIG CLIN: HCPCS | Performed by: INTERNAL MEDICINE

## 2024-01-24 PROCEDURE — 1123F ACP DISCUSS/DSCN MKR DOCD: CPT | Performed by: INTERNAL MEDICINE

## 2024-01-24 PROCEDURE — 1090F PRES/ABSN URINE INCON ASSESS: CPT | Performed by: INTERNAL MEDICINE

## 2024-01-24 NOTE — PROGRESS NOTES
Norma Romero is a 78 y.o. female follow up for ET.      1. Have you been to the ER, urgent care clinic since your last visit?  Hospitalized since your last visit?no     2. Have you seen or consulted any other health care providers outside of the [facility] since your last visit?  Include any pap smears or colon screening. no

## 2024-05-16 ENCOUNTER — TELEPHONE (OUTPATIENT)
Age: 79
End: 2024-05-16

## 2024-05-16 NOTE — TELEPHONE ENCOUNTER
Ed [facility] [place] at [facility]  [phone]    Phone call placed to pt to remind pt to have labs drawn prior to her follow up appointment with . Pt verbalized understanding.

## 2024-05-23 NOTE — PROGRESS NOTES
Cancer [place] at [facility]  [address] [address], [address] [facility] [address]  W: [phone]  F: [phone]      Reason for Visit:   Norma Romero is a 79 y.o. female who is seen today for evaluation of essential thrombocythemia.    Hematology/Oncology Treatment History:   Presented with thrombocytosis  JAK2 V617 mutation positive on 2/28/2019  Bone marrow biopsy 4/9/2019: JAK2 positive myeloproliferative neoplasm, most consistent with essential thrombocythemia.   Essential Thrombocythemia  Hydrea started 4/13/2019 2/2021 Increased to 1000mg daily  4/2022 Reduced to 500mg M/W/F and 1000mg other days    History of Present Illness:   She recently returned from vacation to [place]. She has recovered from the fall and arm fracture from last year, has had her broken teeth repaired.  She reports good compliance with Hydrea, same dose.      Review of systems was obtained and pertinent findings reviewed above. Past medical history, social history, family history, medications, and allergies are located in the electronic medical record.    Physical Exam:   Visit Vitals  BP (!) 130/56 (Site: Left Upper Arm, Position: Sitting, Cuff Size: Large Adult)   Pulse 89   Temp 97.3 °F (36.3 °C) (Temporal)   Resp 18   Ht 1.626 m (5' 4\")   Wt 59.9 kg (132 lb)   SpO2 97%   BMI 22.66 kg/m²       General: no distress  Respiratory: normal respiratory effort  CV: no peripheral edema  Skin: no rashes; multiple ecchymoses on face; no petechiae      Results:     Lab Results   Component Value Date    WBC 5.1 05/28/2024    HGB 13.0 05/28/2024    HCT 38.9 05/28/2024     05/28/2024    .7 (H) 05/28/2024    NEUTROABS 3.1 05/28/2024     Lab Results   Component Value Date     05/28/2024    K 4.7 05/28/2024     05/28/2024    CO2 31 05/28/2024    GLUCOSE 87 05/28/2024    BUN 15 05/28/2024    CREATININE 0.66 05/28/2024    LABGLOM 88 05/28/2024    CALCIUM 8.9 05/28/2024     Lab Results

## 2024-05-28 DIAGNOSIS — D47.3 ESSENTIAL THROMBOCYTOSIS (HCC): ICD-10-CM

## 2024-05-28 LAB
ALBUMIN SERPL-MCNC: 3.5 G/DL (ref 3.5–5)
ALBUMIN/GLOB SERPL: 1.3 (ref 1.1–2.2)
ALP SERPL-CCNC: 63 U/L (ref 45–117)
ALT SERPL-CCNC: 41 U/L (ref 12–78)
ANION GAP SERPL CALC-SCNC: 3 MMOL/L (ref 5–15)
AST SERPL-CCNC: 36 U/L (ref 15–37)
BASOPHILS # BLD: 0.1 K/UL (ref 0–0.1)
BASOPHILS NFR BLD: 1 % (ref 0–1)
BILIRUB DIRECT SERPL-MCNC: 0.1 MG/DL (ref 0–0.2)
BILIRUB SERPL-MCNC: 0.4 MG/DL (ref 0.2–1)
BUN SERPL-MCNC: 15 MG/DL (ref 6–20)
BUN/CREAT SERPL: 23 (ref 12–20)
CALCIUM SERPL-MCNC: 8.9 MG/DL (ref 8.5–10.1)
CHLORIDE SERPL-SCNC: 106 MMOL/L (ref 97–108)
CO2 SERPL-SCNC: 31 MMOL/L (ref 21–32)
CREAT SERPL-MCNC: 0.66 MG/DL (ref 0.55–1.02)
DIFFERENTIAL METHOD BLD: ABNORMAL
EOSINOPHIL # BLD: 0.1 K/UL (ref 0–0.4)
EOSINOPHIL NFR BLD: 1 % (ref 0–7)
ERYTHROCYTE [DISTWIDTH] IN BLOOD BY AUTOMATED COUNT: 13.7 % (ref 11.5–14.5)
GLOBULIN SER CALC-MCNC: 2.6 G/DL (ref 2–4)
GLUCOSE SERPL-MCNC: 87 MG/DL (ref 65–100)
HCT VFR BLD AUTO: 38.9 % (ref 35–47)
HGB BLD-MCNC: 13 G/DL (ref 11.5–16)
IMM GRANULOCYTES # BLD AUTO: 0 K/UL (ref 0–0.04)
IMM GRANULOCYTES NFR BLD AUTO: 0 % (ref 0–0.5)
LYMPHOCYTES # BLD: 1.5 K/UL (ref 0.8–3.5)
LYMPHOCYTES NFR BLD: 29 % (ref 12–49)
MCH RBC QN AUTO: 38.3 PG (ref 26–34)
MCHC RBC AUTO-ENTMCNC: 33.4 G/DL (ref 30–36.5)
MCV RBC AUTO: 114.7 FL (ref 80–99)
MONOCYTES # BLD: 0.3 K/UL (ref 0–1)
MONOCYTES NFR BLD: 6 % (ref 5–13)
NEUTS SEG # BLD: 3.1 K/UL (ref 1.8–8)
NEUTS SEG NFR BLD: 63 % (ref 32–75)
NRBC # BLD: 0 K/UL (ref 0–0.01)
NRBC BLD-RTO: 0 PER 100 WBC
PLATELET # BLD AUTO: 306 K/UL (ref 150–400)
PLATELET COMMENT: ABNORMAL
PMV BLD AUTO: 9.5 FL (ref 8.9–12.9)
POTASSIUM SERPL-SCNC: 4.7 MMOL/L (ref 3.5–5.1)
PROT SERPL-MCNC: 6.1 G/DL (ref 6.4–8.2)
RBC # BLD AUTO: 3.39 M/UL (ref 3.8–5.2)
RBC MORPH BLD: ABNORMAL
SODIUM SERPL-SCNC: 140 MMOL/L (ref 136–145)
WBC # BLD AUTO: 5.1 K/UL (ref 3.6–11)

## 2024-05-30 ENCOUNTER — OFFICE VISIT (OUTPATIENT)
Age: 79
End: 2024-05-30
Payer: MEDICARE

## 2024-05-30 VITALS
RESPIRATION RATE: 18 BRPM | SYSTOLIC BLOOD PRESSURE: 130 MMHG | WEIGHT: 132 LBS | HEART RATE: 89 BPM | OXYGEN SATURATION: 97 % | TEMPERATURE: 97.3 F | HEIGHT: 64 IN | DIASTOLIC BLOOD PRESSURE: 56 MMHG | BODY MASS INDEX: 22.53 KG/M2

## 2024-05-30 DIAGNOSIS — D47.3 ESSENTIAL THROMBOCYTOSIS (HCC): Primary | ICD-10-CM

## 2024-05-30 PROCEDURE — 99214 OFFICE O/P EST MOD 30 MIN: CPT | Performed by: INTERNAL MEDICINE

## 2024-05-30 RX ORDER — OLMESARTAN MEDOXOMIL 20 MG/1
20 TABLET ORAL DAILY
COMMUNITY
Start: 2024-04-15

## 2024-05-30 RX ORDER — HYDROXYUREA 500 MG/1
CAPSULE ORAL
Qty: 50 CAPSULE | Refills: 11 | Status: SHIPPED | OUTPATIENT
Start: 2024-05-30

## 2024-05-30 NOTE — PROGRESS NOTES
Norma Romero is a 79 y.o. female follow up for ET.      1. Have you been to the ER, urgent care clinic since your last visit?  Hospitalized since your last visit?no    2. Have you seen or consulted any other health care providers outside of the [facility] since your last visit?  Include any pap smears or colon screening. no

## 2024-07-05 DIAGNOSIS — D47.3 ESSENTIAL THROMBOCYTOSIS (HCC): ICD-10-CM

## 2024-07-05 RX ORDER — HYDROXYUREA 500 MG/1
CAPSULE ORAL
Qty: 45 CAPSULE | Refills: 2 | Status: SHIPPED | OUTPATIENT
Start: 2024-07-05

## 2024-07-19 RX ORDER — HEPARIN 100 UNIT/ML
500 SYRINGE INTRAVENOUS PRN
Status: CANCELLED | OUTPATIENT
Start: 2024-07-21

## 2024-07-19 RX ORDER — FAMOTIDINE 10 MG/ML
20 INJECTION, SOLUTION INTRAVENOUS
Status: CANCELLED | OUTPATIENT
Start: 2024-07-21

## 2024-07-19 RX ORDER — SODIUM CHLORIDE 9 MG/ML
INJECTION, SOLUTION INTRAVENOUS CONTINUOUS
Status: CANCELLED | OUTPATIENT
Start: 2024-07-21

## 2024-07-19 RX ORDER — EPINEPHRINE 1 MG/ML
0.3 INJECTION, SOLUTION INTRAMUSCULAR; SUBCUTANEOUS PRN
Status: CANCELLED | OUTPATIENT
Start: 2024-07-21

## 2024-07-19 RX ORDER — ACETAMINOPHEN 325 MG/1
650 TABLET ORAL
Status: CANCELLED | OUTPATIENT
Start: 2024-07-21

## 2024-07-19 RX ORDER — ONDANSETRON 2 MG/ML
8 INJECTION INTRAMUSCULAR; INTRAVENOUS
Status: CANCELLED | OUTPATIENT
Start: 2024-07-21

## 2024-07-19 RX ORDER — DIPHENHYDRAMINE HYDROCHLORIDE 50 MG/ML
50 INJECTION INTRAMUSCULAR; INTRAVENOUS
Status: CANCELLED | OUTPATIENT
Start: 2024-07-21

## 2024-07-19 RX ORDER — SODIUM CHLORIDE 0.9 % (FLUSH) 0.9 %
5-40 SYRINGE (ML) INJECTION PRN
Status: CANCELLED | OUTPATIENT
Start: 2024-07-21

## 2024-07-19 RX ORDER — ALBUTEROL SULFATE 90 UG/1
4 AEROSOL, METERED RESPIRATORY (INHALATION) PRN
Status: CANCELLED | OUTPATIENT
Start: 2024-07-21

## 2024-07-19 RX ORDER — SODIUM CHLORIDE 9 MG/ML
5-250 INJECTION, SOLUTION INTRAVENOUS PRN
Status: CANCELLED | OUTPATIENT
Start: 2024-07-21

## 2024-07-26 ENCOUNTER — HOSPITAL ENCOUNTER (OUTPATIENT)
Facility: HOSPITAL | Age: 79
Setting detail: INFUSION SERIES
Discharge: HOME | End: 2024-07-26
Payer: MEDICARE

## 2024-07-26 VITALS
DIASTOLIC BLOOD PRESSURE: 59 MMHG | RESPIRATION RATE: 18 BRPM | HEART RATE: 84 BPM | SYSTOLIC BLOOD PRESSURE: 121 MMHG | TEMPERATURE: 97.2 F

## 2024-07-26 DIAGNOSIS — M81.0 OSTEOPOROSIS, UNSPECIFIED OSTEOPOROSIS TYPE, UNSPECIFIED PATHOLOGICAL FRACTURE PRESENCE: Primary | ICD-10-CM

## 2024-07-26 LAB
ANION GAP BLD CALC-SCNC: 9.8 MMOL/L (ref 10–20)
CA-I BLD-MCNC: 1.19 MMOL/L (ref 1.12–1.32)
CHLORIDE BLD-SCNC: 100 MMOL/L (ref 98–107)
CO2 BLD-SCNC: 29.2 MMOL/L (ref 21–32)
CREAT BLD-MCNC: 0.51 MG/DL (ref 0.6–1.3)
GLUCOSE BLD-MCNC: 69 MG/DL (ref 70–110)
POTASSIUM BLD-SCNC: 3.9 MMOL/L (ref 3.5–5.1)
SERVICE CMNT-IMP: ABNORMAL
SODIUM BLD-SCNC: 139 MMOL/L (ref 136–145)

## 2024-07-26 PROCEDURE — 96374 THER/PROPH/DIAG INJ IV PUSH: CPT

## 2024-07-26 PROCEDURE — 6360000002 HC RX W HCPCS: Performed by: FAMILY MEDICINE

## 2024-07-26 PROCEDURE — 80047 BASIC METABLC PNL IONIZED CA: CPT

## 2024-07-26 PROCEDURE — 2580000003 HC RX 258: Performed by: FAMILY MEDICINE

## 2024-07-26 RX ORDER — ONDANSETRON 2 MG/ML
8 INJECTION INTRAMUSCULAR; INTRAVENOUS
OUTPATIENT
Start: 2025-07-25

## 2024-07-26 RX ORDER — SODIUM CHLORIDE 9 MG/ML
5-250 INJECTION, SOLUTION INTRAVENOUS PRN
OUTPATIENT
Start: 2025-07-25

## 2024-07-26 RX ORDER — FAMOTIDINE 10 MG/ML
20 INJECTION, SOLUTION INTRAVENOUS
OUTPATIENT
Start: 2025-07-25

## 2024-07-26 RX ORDER — ACETAMINOPHEN 325 MG/1
650 TABLET ORAL
OUTPATIENT
Start: 2025-07-25

## 2024-07-26 RX ORDER — ZOLEDRONIC ACID 5 MG/100ML
5 INJECTION, SOLUTION INTRAVENOUS ONCE
OUTPATIENT
Start: 2025-07-25 | End: 2025-07-25

## 2024-07-26 RX ORDER — ZOLEDRONIC ACID 5 MG/100ML
5 INJECTION, SOLUTION INTRAVENOUS ONCE
Status: COMPLETED | OUTPATIENT
Start: 2024-07-26 | End: 2024-07-26

## 2024-07-26 RX ORDER — SODIUM CHLORIDE 9 MG/ML
5-250 INJECTION, SOLUTION INTRAVENOUS PRN
Status: DISCONTINUED | OUTPATIENT
Start: 2024-07-26 | End: 2024-07-27 | Stop reason: HOSPADM

## 2024-07-26 RX ORDER — DIPHENHYDRAMINE HYDROCHLORIDE 50 MG/ML
50 INJECTION INTRAMUSCULAR; INTRAVENOUS
OUTPATIENT
Start: 2025-07-25

## 2024-07-26 RX ORDER — SODIUM CHLORIDE 9 MG/ML
INJECTION, SOLUTION INTRAVENOUS CONTINUOUS
OUTPATIENT
Start: 2025-07-25

## 2024-07-26 RX ORDER — HEPARIN 100 UNIT/ML
500 SYRINGE INTRAVENOUS PRN
OUTPATIENT
Start: 2025-07-25

## 2024-07-26 RX ORDER — ALBUTEROL SULFATE 90 UG/1
4 AEROSOL, METERED RESPIRATORY (INHALATION) PRN
OUTPATIENT
Start: 2025-07-25

## 2024-07-26 RX ORDER — SODIUM CHLORIDE 0.9 % (FLUSH) 0.9 %
5-40 SYRINGE (ML) INJECTION PRN
OUTPATIENT
Start: 2025-07-25

## 2024-07-26 RX ORDER — EPINEPHRINE 1 MG/ML
0.3 INJECTION, SOLUTION INTRAMUSCULAR; SUBCUTANEOUS PRN
OUTPATIENT
Start: 2025-07-25

## 2024-07-26 RX ADMIN — ZOLEDRONIC ACID 5 MG: 0.05 INJECTION, SOLUTION INTRAVENOUS at 15:56

## 2024-07-26 RX ADMIN — SODIUM CHLORIDE 25 ML/HR: 9 INJECTION, SOLUTION INTRAVENOUS at 15:55

## 2024-07-26 ASSESSMENT — PAIN SCALES - GENERAL: PAINLEVEL_OUTOF10: 0

## 2024-07-26 NOTE — PROGRESS NOTES
[facility] Progress Note        Date: 24    Name: Norma Romero    MRN: [id number]         : 1945      Ms. Romero admitted to [facility] for Reclast ambulatory in stable condition. Assessment completed. No new concerns voiced.  24 gauge peripheral IV obtained in the right forearm without difficulty, line flushed and capped Labs drawn and sent for processing.      ** Patient has received this medication in the past. Patient reports no previous reactions to the medication(s). Patient denies any recent dental procedures.        Vitals:    24 1527   BP: (!) 121/59   Pulse: 84   Resp: 18   Temp: 97.2 °F (36.2 °C)   TempSrc: Temporal           Results for orders placed or performed during the hospital encounter of 24   POC CHEM 8   Result Value Ref Range    POC Ionized Calcium 1.19 1.12 - 1.32 mmol/L    POC Sodium 139 136 - 145 mmol/L    POC Potassium 3.9 3.5 - 5.1 mmol/L    POC Chloride 100 98 - 107 mmol/L    POC TCO2 29.2 21 - 32 mmol/L    Anion Gap, POC 9.8 (L) 10 - 20 mmol/L    POC Glucose 69 (L) 70 - 110 mg/dL    POC Creatinine 0.51 (L) 0.6 - 1.3 mg/dL    eGFR, POC >90 >60 ml/min/1.73m2    UA Comment Comment Not Indicated.               Medications:  MEDICATIONS GIVEN:  Medications Administered         0.9 % sodium chloride infusion Admin Date  2024 Action  New Bag Dose  25 mL/hr Rate  25 mL/hr Route  IntraVENous Administered By  Pippa Brody RN        zoledronic acid (RECLAST) 5 mg/100 mL infusion Admin Date  2024 Action  New Bag Dose  5 mg Rate  400 mL/hr Route  IntraVENous Administered By  Pippa Brody RN                Post-Infusion Vitals:  Vitals:    24 1527   BP: (!) 121/59   Pulse: 84   Resp: 18   Temp: 97.2 °F (36.2 °C)             Pt tolerated treatment well. PIV maintained positive blood return throughout treatment, flushed with positive blood return at conclusion and removed and wrapped in coban per protocol. D/c home

## 2024-10-24 ENCOUNTER — TELEPHONE (OUTPATIENT)
Age: 79
End: 2024-10-24

## 2024-10-24 NOTE — TELEPHONE ENCOUNTER
Ed [facility] [place] at [facility]  [phone]    Phone call placed to pt to remind pt to have labs drawn prior to her follow up appointment with . Pt verbalized understanding.

## 2024-11-01 DIAGNOSIS — D47.3 ESSENTIAL THROMBOCYTOSIS (HCC): ICD-10-CM

## 2024-11-01 LAB
ALBUMIN SERPL-MCNC: 3.3 G/DL (ref 3.5–5)
ALBUMIN/GLOB SERPL: 1.1 (ref 1.1–2.2)
ALP SERPL-CCNC: 82 U/L (ref 45–117)
ALT SERPL-CCNC: 39 U/L (ref 12–78)
ANION GAP SERPL CALC-SCNC: 6 MMOL/L (ref 2–12)
AST SERPL-CCNC: 32 U/L (ref 15–37)
BASOPHILS # BLD: 0 K/UL (ref 0–0.1)
BASOPHILS NFR BLD: 1 % (ref 0–1)
BILIRUB DIRECT SERPL-MCNC: 0.2 MG/DL (ref 0–0.2)
BILIRUB SERPL-MCNC: 0.3 MG/DL (ref 0.2–1)
BUN SERPL-MCNC: 22 MG/DL (ref 6–20)
BUN/CREAT SERPL: 31 (ref 12–20)
CALCIUM SERPL-MCNC: 8.9 MG/DL (ref 8.5–10.1)
CHLORIDE SERPL-SCNC: 107 MMOL/L (ref 97–108)
CO2 SERPL-SCNC: 28 MMOL/L (ref 21–32)
CREAT SERPL-MCNC: 0.71 MG/DL (ref 0.55–1.02)
DIFFERENTIAL METHOD BLD: ABNORMAL
EOSINOPHIL # BLD: 0 K/UL (ref 0–0.4)
EOSINOPHIL NFR BLD: 1 % (ref 0–7)
ERYTHROCYTE [DISTWIDTH] IN BLOOD BY AUTOMATED COUNT: 13.8 % (ref 11.5–14.5)
GLOBULIN SER CALC-MCNC: 3 G/DL (ref 2–4)
GLUCOSE SERPL-MCNC: 83 MG/DL (ref 65–100)
HCT VFR BLD AUTO: 36.3 % (ref 35–47)
HGB BLD-MCNC: 12.2 G/DL (ref 11.5–16)
IMM GRANULOCYTES # BLD AUTO: 0 K/UL (ref 0–0.04)
IMM GRANULOCYTES NFR BLD AUTO: 0 % (ref 0–0.5)
LYMPHOCYTES # BLD: 1.5 K/UL (ref 0.8–3.5)
LYMPHOCYTES NFR BLD: 44 % (ref 12–49)
MCH RBC QN AUTO: 38.1 PG (ref 26–34)
MCHC RBC AUTO-ENTMCNC: 33.6 G/DL (ref 30–36.5)
MCV RBC AUTO: 113.4 FL (ref 80–99)
MONOCYTES # BLD: 0.3 K/UL (ref 0–1)
MONOCYTES NFR BLD: 8 % (ref 5–13)
NEUTS SEG # BLD: 1.6 K/UL (ref 1.8–8)
NEUTS SEG NFR BLD: 46 % (ref 32–75)
NRBC # BLD: 0 K/UL (ref 0–0.01)
NRBC BLD-RTO: 0 PER 100 WBC
PLATELET # BLD AUTO: 251 K/UL (ref 150–400)
PMV BLD AUTO: 9.4 FL (ref 8.9–12.9)
POTASSIUM SERPL-SCNC: 3.9 MMOL/L (ref 3.5–5.1)
PROT SERPL-MCNC: 6.3 G/DL (ref 6.4–8.2)
RBC # BLD AUTO: 3.2 M/UL (ref 3.8–5.2)
RBC MORPH BLD: ABNORMAL
SODIUM SERPL-SCNC: 141 MMOL/L (ref 136–145)
WBC # BLD AUTO: 3.4 K/UL (ref 3.6–11)

## 2024-11-06 ENCOUNTER — OFFICE VISIT (OUTPATIENT)
Age: 79
End: 2024-11-06
Payer: MEDICARE

## 2024-11-06 VITALS
RESPIRATION RATE: 18 BRPM | SYSTOLIC BLOOD PRESSURE: 144 MMHG | TEMPERATURE: 97 F | DIASTOLIC BLOOD PRESSURE: 72 MMHG | HEART RATE: 72 BPM | OXYGEN SATURATION: 98 %

## 2024-11-06 DIAGNOSIS — D47.3 ESSENTIAL THROMBOCYTOSIS (HCC): Primary | ICD-10-CM

## 2024-11-06 PROCEDURE — G2211 COMPLEX E/M VISIT ADD ON: HCPCS | Performed by: INTERNAL MEDICINE

## 2024-11-06 PROCEDURE — 99214 OFFICE O/P EST MOD 30 MIN: CPT | Performed by: INTERNAL MEDICINE

## 2024-11-06 PROCEDURE — 1160F RVW MEDS BY RX/DR IN RCRD: CPT | Performed by: INTERNAL MEDICINE

## 2024-11-06 PROCEDURE — 3078F DIAST BP <80 MM HG: CPT | Performed by: INTERNAL MEDICINE

## 2024-11-06 PROCEDURE — G8420 CALC BMI NORM PARAMETERS: HCPCS | Performed by: INTERNAL MEDICINE

## 2024-11-06 PROCEDURE — 1123F ACP DISCUSS/DSCN MKR DOCD: CPT | Performed by: INTERNAL MEDICINE

## 2024-11-06 PROCEDURE — G8427 DOCREV CUR MEDS BY ELIG CLIN: HCPCS | Performed by: INTERNAL MEDICINE

## 2024-11-06 PROCEDURE — 1090F PRES/ABSN URINE INCON ASSESS: CPT | Performed by: INTERNAL MEDICINE

## 2024-11-06 PROCEDURE — 1126F AMNT PAIN NOTED NONE PRSNT: CPT | Performed by: INTERNAL MEDICINE

## 2024-11-06 PROCEDURE — 1036F TOBACCO NON-USER: CPT | Performed by: INTERNAL MEDICINE

## 2024-11-06 PROCEDURE — 1159F MED LIST DOCD IN RCRD: CPT | Performed by: INTERNAL MEDICINE

## 2024-11-06 PROCEDURE — G8400 PT W/DXA NO RESULTS DOC: HCPCS | Performed by: INTERNAL MEDICINE

## 2024-11-06 PROCEDURE — G8484 FLU IMMUNIZE NO ADMIN: HCPCS | Performed by: INTERNAL MEDICINE

## 2024-11-06 PROCEDURE — 3077F SYST BP >= 140 MM HG: CPT | Performed by: INTERNAL MEDICINE

## 2024-11-06 RX ORDER — HYDROXYUREA 500 MG/1
CAPSULE ORAL
Qty: 140 CAPSULE | Refills: 3 | Status: SHIPPED | OUTPATIENT
Start: 2024-11-06

## 2024-11-06 NOTE — PROGRESS NOTES
Norma Romero is a 79 y.o. female follow up for ET.    1. Have you been to the ER, urgent care clinic since your last visit?  Hospitalized since your last visit?no    2. Have you seen or consulted any other health care providers outside of the [facility] since your last visit?  Include any pap smears or colon screening. no  
    Platelets   Date Value   11/01/2024 251 K/uL   05/28/2024 306 K/uL   01/18/2024 357 K/uL   09/12/2023 297 K/uL   05/23/2023 198 K/uL   02/08/2023 236 K/uL   11/09/2022 195 K/uL   08/04/2022 184 K/uL   06/03/2022 218 K/uL   04/18/2022 188 K/uL   01/10/2022 188 K/uL   09/28/2021 206 K/uL   06/23/2021 273 K/uL   04/26/2021 248 K/uL   03/23/2021 303 K/uL   02/15/2021 440 K/uL (H)   11/16/2020 433 K/uL (H)   08/17/2020 415 K/uL (H)   05/14/2020 425 x10E3/uL   02/14/2020 432 K/uL (H)   12/20/2019 422 K/uL (H)   11/14/2019 458 x10E3/uL (H)       08/13/2019  434 x10E3/uL     07/11/2019  434 x10E3/uL     06/11/2019  465 x10E3/uL (H)     05/09/2019  480 x10E3/uL (H)     03/28/2019  491 K/uL (H)     02/28/2019  547 x10E3/uL (H)     06/14/2018  458 x10E3/uL (H)     08/21/2012  321 K/uL        08/02/2012  298 K/uL         Assessment:   Essential Thrombocythemia  JAK2+  Diagnosis confirmed on bone marrow biopsy.  She had an IPSET prognosis score of 2, which is intermediate risk and translates to a median overall survival of 17 years to 24.5 years, depending on the  study.  Her IPSET thrombosis score is high risk based on age and JAK2 mutation, with a 3.6%/year risk of thrombosis.  She began hydrea therapy 4/2019, with goal of reducing her platelets into the normal range to reduce her risk of thrombotic complications. She is currently on Hydrea 500mg on M/W/F and 1000mg on other days.    Her labs remain at goal.  She is tolerating therapy with manageable toxicity.  We will proceed with treatment and monitor.     Interestingly, her mother had PV, so there may be a familial component to her illness.       Mouth sores  Resolved recently.       H/o CVA  On plavix.  Seen by neurology previously.       Depression  Stable, no longer following with psychiatrist, but she is seeing a counselor.      Elevated AST  Mild and intermittent. No etoh.  Possibly from medications?  Normal on recent labs. Monitor.      Plan:     Continue

## 2025-04-30 NOTE — PROGRESS NOTES
Cancer [place] at [facility]  [address] [address], [address] [facility] [address]  W: [phone]  F: [phone]      Reason for Visit:   Norma Romero is a 80 y.o. female who is seen today for evaluation of essential thrombocythemia.    Hematology/Oncology Treatment History:   Presented with thrombocytosis  JAK2 V617 mutation positive on 2/28/2019  Bone marrow biopsy 4/9/2019: JAK2 positive myeloproliferative neoplasm, most consistent with essential thrombocythemia.   Essential Thrombocythemia  Hydrea started 4/13/2019 2/2021 Increased to 1000mg daily  4/2022 Reduced to 500mg M/W/F and 1000mg other days    History of Present Illness:   She remains on Hydrea, reports good compliance. No side effects she is noticing from this. She remains on Plavix and has quite a bit of bruising on her legs and occasionally arms with this. No arik bleeding. Energy has been pretty good, overall better.        Review of systems was obtained and pertinent findings reviewed above. Past medical history, social history, family history, medications, and allergies are located in the electronic medical record.    Physical Exam:   Visit Vitals  BP (!) 145/57 (BP Site: Right Upper Arm, Patient Position: Sitting, BP Cuff Size: Small Adult)   Pulse 66   Temp 97.4 °F (36.3 °C) (Temporal)   Resp 16   Ht 1.626 m (5' 4\")   Wt 63.5 kg (140 lb)   SpO2 96%   BMI 24.03 kg/m²       General: no distress  Respiratory: normal respiratory effort  CV: no peripheral edema  Skin: no rashes; multiple ecchymoses on face; no petechiae      Results:     Lab Results   Component Value Date    WBC 5.4 05/02/2025    HGB 12.5 05/02/2025    HCT 37.8 05/02/2025     (L) 05/02/2025    .1 (H) 05/02/2025    NEUTROABS 3.03 05/02/2025     Lab Results   Component Value Date     05/02/2025    K 4.1 05/02/2025     05/02/2025    CO2 29 05/02/2025    GLUCOSE 78 05/02/2025    BUN 15 05/02/2025    CREATININE 0.69 05/02/2025

## 2025-05-02 DIAGNOSIS — D47.3 ESSENTIAL THROMBOCYTOSIS (HCC): ICD-10-CM

## 2025-05-02 LAB
ALBUMIN SERPL-MCNC: 3.6 G/DL (ref 3.5–5)
ALBUMIN/GLOB SERPL: 1.1 (ref 1.1–2.2)
ALP SERPL-CCNC: 88 U/L (ref 45–117)
ALT SERPL-CCNC: 65 U/L (ref 12–78)
ANION GAP SERPL CALC-SCNC: 6 MMOL/L (ref 2–12)
AST SERPL-CCNC: 37 U/L (ref 15–37)
BASOPHILS # BLD: 0.03 K/UL (ref 0–0.1)
BASOPHILS NFR BLD: 0.6 % (ref 0–1)
BILIRUB DIRECT SERPL-MCNC: 0.1 MG/DL (ref 0–0.2)
BILIRUB SERPL-MCNC: 0.3 MG/DL (ref 0.2–1)
BUN SERPL-MCNC: 15 MG/DL (ref 6–20)
BUN/CREAT SERPL: 22 (ref 12–20)
CALCIUM SERPL-MCNC: 8.9 MG/DL (ref 8.5–10.1)
CHLORIDE SERPL-SCNC: 104 MMOL/L (ref 97–108)
CO2 SERPL-SCNC: 29 MMOL/L (ref 21–32)
CREAT SERPL-MCNC: 0.69 MG/DL (ref 0.55–1.02)
DIFFERENTIAL METHOD BLD: ABNORMAL
EOSINOPHIL # BLD: 0.03 K/UL (ref 0–0.4)
EOSINOPHIL NFR BLD: 0.6 % (ref 0–7)
ERYTHROCYTE [DISTWIDTH] IN BLOOD BY AUTOMATED COUNT: 13.2 % (ref 11.5–14.5)
GLOBULIN SER CALC-MCNC: 3.2 G/DL (ref 2–4)
GLUCOSE SERPL-MCNC: 78 MG/DL (ref 65–100)
HCT VFR BLD AUTO: 37.8 % (ref 35–47)
HGB BLD-MCNC: 12.5 G/DL (ref 11.5–16)
IMM GRANULOCYTES # BLD AUTO: 0.01 K/UL (ref 0–0.04)
IMM GRANULOCYTES NFR BLD AUTO: 0.2 % (ref 0–0.5)
LYMPHOCYTES # BLD: 1.84 K/UL (ref 0.8–3.5)
LYMPHOCYTES NFR BLD: 34.1 % (ref 12–49)
MCH RBC QN AUTO: 35.4 PG (ref 26–34)
MCHC RBC AUTO-ENTMCNC: 33.1 G/DL (ref 30–36.5)
MCV RBC AUTO: 107.1 FL (ref 80–99)
MONOCYTES # BLD: 0.45 K/UL (ref 0–1)
MONOCYTES NFR BLD: 8.3 % (ref 5–13)
NEUTS SEG # BLD: 3.03 K/UL (ref 1.8–8)
NEUTS SEG NFR BLD: 56.2 % (ref 32–75)
NRBC # BLD: 0 K/UL (ref 0–0.01)
NRBC BLD-RTO: 0 PER 100 WBC
PLATELET # BLD AUTO: 146 K/UL (ref 150–400)
PMV BLD AUTO: 10.7 FL (ref 8.9–12.9)
POTASSIUM SERPL-SCNC: 4.1 MMOL/L (ref 3.5–5.1)
PROT SERPL-MCNC: 6.8 G/DL (ref 6.4–8.2)
RBC # BLD AUTO: 3.53 M/UL (ref 3.8–5.2)
SODIUM SERPL-SCNC: 139 MMOL/L (ref 136–145)
WBC # BLD AUTO: 5.4 K/UL (ref 3.6–11)

## 2025-05-07 ENCOUNTER — OFFICE VISIT (OUTPATIENT)
Age: 80
End: 2025-05-07
Payer: MEDICARE

## 2025-05-07 VITALS
OXYGEN SATURATION: 96 % | HEART RATE: 66 BPM | BODY MASS INDEX: 23.9 KG/M2 | WEIGHT: 140 LBS | TEMPERATURE: 97.4 F | DIASTOLIC BLOOD PRESSURE: 57 MMHG | HEIGHT: 64 IN | SYSTOLIC BLOOD PRESSURE: 145 MMHG | RESPIRATION RATE: 16 BRPM

## 2025-05-07 DIAGNOSIS — D47.3 ESSENTIAL THROMBOCYTOSIS (HCC): Primary | ICD-10-CM

## 2025-05-07 PROCEDURE — G2211 COMPLEX E/M VISIT ADD ON: HCPCS | Performed by: INTERNAL MEDICINE

## 2025-05-07 PROCEDURE — G8427 DOCREV CUR MEDS BY ELIG CLIN: HCPCS | Performed by: INTERNAL MEDICINE

## 2025-05-07 PROCEDURE — 1159F MED LIST DOCD IN RCRD: CPT | Performed by: INTERNAL MEDICINE

## 2025-05-07 PROCEDURE — G8400 PT W/DXA NO RESULTS DOC: HCPCS | Performed by: INTERNAL MEDICINE

## 2025-05-07 PROCEDURE — 1036F TOBACCO NON-USER: CPT | Performed by: INTERNAL MEDICINE

## 2025-05-07 PROCEDURE — 1090F PRES/ABSN URINE INCON ASSESS: CPT | Performed by: INTERNAL MEDICINE

## 2025-05-07 PROCEDURE — 1126F AMNT PAIN NOTED NONE PRSNT: CPT | Performed by: INTERNAL MEDICINE

## 2025-05-07 PROCEDURE — 3077F SYST BP >= 140 MM HG: CPT | Performed by: INTERNAL MEDICINE

## 2025-05-07 PROCEDURE — G8420 CALC BMI NORM PARAMETERS: HCPCS | Performed by: INTERNAL MEDICINE

## 2025-05-07 PROCEDURE — 99214 OFFICE O/P EST MOD 30 MIN: CPT | Performed by: INTERNAL MEDICINE

## 2025-05-07 PROCEDURE — 1123F ACP DISCUSS/DSCN MKR DOCD: CPT | Performed by: INTERNAL MEDICINE

## 2025-05-07 PROCEDURE — 1160F RVW MEDS BY RX/DR IN RCRD: CPT | Performed by: INTERNAL MEDICINE

## 2025-05-07 PROCEDURE — 3078F DIAST BP <80 MM HG: CPT | Performed by: INTERNAL MEDICINE

## 2025-05-07 ASSESSMENT — PATIENT HEALTH QUESTIONNAIRE - PHQ9
1. LITTLE INTEREST OR PLEASURE IN DOING THINGS: NOT AT ALL
SUM OF ALL RESPONSES TO PHQ QUESTIONS 1-9: 0
2. FEELING DOWN, DEPRESSED OR HOPELESS: NOT AT ALL

## 2025-05-07 NOTE — PROGRESS NOTES
Identified pt with two pt identifiers(name and ). Reviewed record in preparation for visit and have obtained necessary documentation. All patient medications has been reviewed.    Chief Complaint   Patient presents with    6 Month Follow-Up         Vitals:    25 1131 25 1135   BP: (!) 141/61 (!) 145/57   BP Site: Left Upper Arm Right Upper Arm   Patient Position: Sitting Sitting   BP Cuff Size: Small Adult Small Adult   Pulse: 66    Resp: 16    Temp: 97.4 °F (36.3 °C)    TempSrc: Temporal    SpO2: 96%    Weight: 63.5 kg (140 lb)    Height: 1.626 m (5' 4\")       .  \"Have you been to the ER, urgent care clinic since your last visit?  Hospitalized since your last visit?\"    NO    “Have you seen or consulted any other health care providers outside our system since your last visit?”    NO

## 2025-06-11 DIAGNOSIS — D47.3 ESSENTIAL THROMBOCYTOSIS (HCC): ICD-10-CM

## 2025-06-11 LAB
BASOPHILS # BLD: 0.03 K/UL (ref 0–0.1)
BASOPHILS NFR BLD: 0.5 % (ref 0–1)
DIFFERENTIAL METHOD BLD: ABNORMAL
EOSINOPHIL # BLD: 0.1 K/UL (ref 0–0.4)
EOSINOPHIL NFR BLD: 1.7 % (ref 0–7)
ERYTHROCYTE [DISTWIDTH] IN BLOOD BY AUTOMATED COUNT: 13.6 % (ref 11.5–14.5)
HCT VFR BLD AUTO: 36.5 % (ref 35–47)
HGB BLD-MCNC: 11.5 G/DL (ref 11.5–16)
IMM GRANULOCYTES # BLD AUTO: 0.02 K/UL (ref 0–0.04)
IMM GRANULOCYTES NFR BLD AUTO: 0.3 % (ref 0–0.5)
LYMPHOCYTES # BLD: 2.12 K/UL (ref 0.8–3.5)
LYMPHOCYTES NFR BLD: 37 % (ref 12–49)
MCH RBC QN AUTO: 33.2 PG (ref 26–34)
MCHC RBC AUTO-ENTMCNC: 31.5 G/DL (ref 30–36.5)
MCV RBC AUTO: 105.5 FL (ref 80–99)
MONOCYTES # BLD: 0.48 K/UL (ref 0–1)
MONOCYTES NFR BLD: 8.4 % (ref 5–13)
NEUTS SEG # BLD: 2.98 K/UL (ref 1.8–8)
NEUTS SEG NFR BLD: 52.1 % (ref 32–75)
NRBC # BLD: 0 K/UL (ref 0–0.01)
NRBC BLD-RTO: 0 PER 100 WBC
PLATELET # BLD AUTO: 237 K/UL (ref 150–400)
PMV BLD AUTO: 10.3 FL (ref 8.9–12.9)
RBC # BLD AUTO: 3.46 M/UL (ref 3.8–5.2)
WBC # BLD AUTO: 5.7 K/UL (ref 3.6–11)

## 2025-06-12 ENCOUNTER — RESULTS FOLLOW-UP (OUTPATIENT)
Age: 80
End: 2025-06-12

## 2025-06-27 DIAGNOSIS — M81.0 OSTEOPOROSIS, UNSPECIFIED OSTEOPOROSIS TYPE, UNSPECIFIED PATHOLOGICAL FRACTURE PRESENCE: Primary | ICD-10-CM

## 2025-06-27 RX ORDER — ACETAMINOPHEN 325 MG/1
650 TABLET ORAL
OUTPATIENT
Start: 2025-07-28

## 2025-06-27 RX ORDER — DIPHENHYDRAMINE HYDROCHLORIDE 50 MG/ML
50 INJECTION, SOLUTION INTRAMUSCULAR; INTRAVENOUS
OUTPATIENT
Start: 2025-07-28

## 2025-06-27 RX ORDER — SODIUM CHLORIDE 0.9 % (FLUSH) 0.9 %
5-40 SYRINGE (ML) INJECTION PRN
OUTPATIENT
Start: 2025-07-28

## 2025-06-27 RX ORDER — HYDROCORTISONE SODIUM SUCCINATE 100 MG/2ML
100 INJECTION INTRAMUSCULAR; INTRAVENOUS
OUTPATIENT
Start: 2025-07-28

## 2025-06-27 RX ORDER — ONDANSETRON 2 MG/ML
8 INJECTION INTRAMUSCULAR; INTRAVENOUS
OUTPATIENT
Start: 2025-07-28

## 2025-06-27 RX ORDER — SODIUM CHLORIDE 9 MG/ML
5-250 INJECTION, SOLUTION INTRAVENOUS PRN
OUTPATIENT
Start: 2025-07-28

## 2025-06-27 RX ORDER — ZOLEDRONIC ACID 0.05 MG/ML
5 INJECTION, SOLUTION INTRAVENOUS ONCE
OUTPATIENT
Start: 2025-07-28 | End: 2025-07-07

## 2025-06-27 RX ORDER — ALBUTEROL SULFATE 90 UG/1
4 INHALANT RESPIRATORY (INHALATION) PRN
OUTPATIENT
Start: 2025-07-28

## 2025-06-27 RX ORDER — SODIUM CHLORIDE 9 MG/ML
INJECTION, SOLUTION INTRAVENOUS PRN
OUTPATIENT
Start: 2025-07-28

## 2025-06-27 RX ORDER — HEPARIN 100 UNIT/ML
500 SYRINGE INTRAVENOUS PRN
OUTPATIENT
Start: 2025-07-28

## 2025-06-27 RX ORDER — EPINEPHRINE 1 MG/ML
0.3 INJECTION, SOLUTION, CONCENTRATE INTRAVENOUS PRN
OUTPATIENT
Start: 2025-07-28

## 2025-08-05 ENCOUNTER — TELEPHONE (OUTPATIENT)
Age: 80
End: 2025-08-05

## 2025-08-14 DIAGNOSIS — D47.3 ESSENTIAL THROMBOCYTOSIS (HCC): ICD-10-CM

## 2025-08-15 LAB
ALBUMIN SERPL-MCNC: 3.4 G/DL (ref 3.5–5.2)
ALBUMIN/GLOB SERPL: 1.1 (ref 1.1–2.2)
ALP SERPL-CCNC: 96 U/L (ref 35–104)
ALT SERPL-CCNC: 38 U/L (ref 10–35)
ANION GAP SERPL CALC-SCNC: 12 MMOL/L (ref 2–14)
AST SERPL-CCNC: 41 U/L (ref 10–35)
BASOPHILS # BLD: 0.05 K/UL (ref 0–0.1)
BASOPHILS NFR BLD: 0.6 % (ref 0–1)
BILIRUB DIRECT SERPL-MCNC: 0.2 MG/DL (ref 0.1–0.3)
BILIRUB SERPL-MCNC: 0.3 MG/DL (ref 0–1.2)
BUN SERPL-MCNC: 18 MG/DL (ref 8–23)
BUN/CREAT SERPL: 27 (ref 12–20)
CALCIUM SERPL-MCNC: 9.5 MG/DL (ref 8.8–10.2)
CHLORIDE SERPL-SCNC: 96 MMOL/L (ref 98–107)
CO2 SERPL-SCNC: 25 MMOL/L (ref 20–29)
CREAT SERPL-MCNC: 0.66 MG/DL (ref 0.6–1)
DIFFERENTIAL METHOD BLD: ABNORMAL
EOSINOPHIL # BLD: 0.11 K/UL (ref 0–0.4)
EOSINOPHIL NFR BLD: 1.2 % (ref 0–7)
ERYTHROCYTE [DISTWIDTH] IN BLOOD BY AUTOMATED COUNT: 16.5 % (ref 11.5–14.5)
GLOBULIN SER CALC-MCNC: 3 G/DL (ref 2–4)
GLUCOSE SERPL-MCNC: 97 MG/DL (ref 65–100)
HCT VFR BLD AUTO: 39.3 % (ref 35–47)
HGB BLD-MCNC: 12.7 G/DL (ref 11.5–16)
IMM GRANULOCYTES # BLD AUTO: 0.02 K/UL (ref 0–0.04)
IMM GRANULOCYTES NFR BLD AUTO: 0.2 % (ref 0–0.5)
LYMPHOCYTES # BLD: 3.34 K/UL (ref 0.8–3.5)
LYMPHOCYTES NFR BLD: 37.9 % (ref 12–49)
MCH RBC QN AUTO: 30.8 PG (ref 26–34)
MCHC RBC AUTO-ENTMCNC: 32.3 G/DL (ref 30–36.5)
MCV RBC AUTO: 95.4 FL (ref 80–99)
MONOCYTES # BLD: 0.62 K/UL (ref 0–1)
MONOCYTES NFR BLD: 7 % (ref 5–13)
NEUTS SEG # BLD: 4.67 K/UL (ref 1.8–8)
NEUTS SEG NFR BLD: 53.1 % (ref 32–75)
NRBC # BLD: 0 K/UL (ref 0–0.01)
NRBC BLD-RTO: 0 PER 100 WBC
PLATELET # BLD AUTO: 705 K/UL (ref 150–400)
PMV BLD AUTO: 9.2 FL (ref 8.9–12.9)
POTASSIUM SERPL-SCNC: 4.9 MMOL/L (ref 3.5–5.1)
PROT SERPL-MCNC: 6.4 G/DL (ref 6.4–8.3)
RBC # BLD AUTO: 4.12 M/UL (ref 3.8–5.2)
SODIUM SERPL-SCNC: 133 MMOL/L (ref 136–145)
WBC # BLD AUTO: 8.8 K/UL (ref 3.6–11)

## 2025-08-20 ENCOUNTER — OFFICE VISIT (OUTPATIENT)
Age: 80
End: 2025-08-20
Payer: MEDICARE

## 2025-08-20 VITALS
OXYGEN SATURATION: 98 % | SYSTOLIC BLOOD PRESSURE: 139 MMHG | DIASTOLIC BLOOD PRESSURE: 52 MMHG | HEART RATE: 72 BPM | RESPIRATION RATE: 20 BRPM | TEMPERATURE: 97.7 F

## 2025-08-20 DIAGNOSIS — D47.3 ESSENTIAL THROMBOCYTOSIS (HCC): Primary | ICD-10-CM

## 2025-08-20 PROCEDURE — 1160F RVW MEDS BY RX/DR IN RCRD: CPT | Performed by: INTERNAL MEDICINE

## 2025-08-20 PROCEDURE — G8427 DOCREV CUR MEDS BY ELIG CLIN: HCPCS | Performed by: INTERNAL MEDICINE

## 2025-08-20 PROCEDURE — G8420 CALC BMI NORM PARAMETERS: HCPCS | Performed by: INTERNAL MEDICINE

## 2025-08-20 PROCEDURE — 1159F MED LIST DOCD IN RCRD: CPT | Performed by: INTERNAL MEDICINE

## 2025-08-20 PROCEDURE — 99214 OFFICE O/P EST MOD 30 MIN: CPT | Performed by: INTERNAL MEDICINE

## 2025-08-20 PROCEDURE — 1090F PRES/ABSN URINE INCON ASSESS: CPT | Performed by: INTERNAL MEDICINE

## 2025-08-20 PROCEDURE — G8400 PT W/DXA NO RESULTS DOC: HCPCS | Performed by: INTERNAL MEDICINE

## 2025-08-20 PROCEDURE — 3078F DIAST BP <80 MM HG: CPT | Performed by: INTERNAL MEDICINE

## 2025-08-20 PROCEDURE — G2211 COMPLEX E/M VISIT ADD ON: HCPCS | Performed by: INTERNAL MEDICINE

## 2025-08-20 PROCEDURE — 1123F ACP DISCUSS/DSCN MKR DOCD: CPT | Performed by: INTERNAL MEDICINE

## 2025-08-20 PROCEDURE — 1036F TOBACCO NON-USER: CPT | Performed by: INTERNAL MEDICINE

## 2025-08-20 PROCEDURE — 1125F AMNT PAIN NOTED PAIN PRSNT: CPT | Performed by: INTERNAL MEDICINE

## 2025-08-20 PROCEDURE — 3075F SYST BP GE 130 - 139MM HG: CPT | Performed by: INTERNAL MEDICINE
